# Patient Record
Sex: FEMALE | Race: BLACK OR AFRICAN AMERICAN | URBAN - METROPOLITAN AREA
[De-identification: names, ages, dates, MRNs, and addresses within clinical notes are randomized per-mention and may not be internally consistent; named-entity substitution may affect disease eponyms.]

---

## 2017-02-22 ENCOUNTER — EMERGENCY (EMERGENCY)
Facility: HOSPITAL | Age: 57
LOS: 0 days | Discharge: HOME | End: 2017-02-22
Attending: EMERGENCY MEDICINE | Admitting: EMERGENCY MEDICINE
Payer: OTHER MISCELLANEOUS

## 2017-02-22 VITALS
DIASTOLIC BLOOD PRESSURE: 81 MMHG | WEIGHT: 184.97 LBS | RESPIRATION RATE: 18 BRPM | OXYGEN SATURATION: 100 % | HEART RATE: 94 BPM | HEIGHT: 65 IN | SYSTOLIC BLOOD PRESSURE: 132 MMHG | TEMPERATURE: 97 F

## 2017-02-22 DIAGNOSIS — S90.422A BLISTER (NONTHERMAL), LEFT GREAT TOE, INITIAL ENCOUNTER: ICD-10-CM
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
## 2017-02-22 DIAGNOSIS — Z89.422 ACQUIRED ABSENCE OF OTHER LEFT TOE(S): Chronic | ICD-10-CM
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
## 2017-02-22 DIAGNOSIS — Z88.2 ALLERGY STATUS TO SULFONAMIDES: ICD-10-CM

## 2017-02-22 DIAGNOSIS — E11.9 TYPE 2 DIABETES MELLITUS WITHOUT COMPLICATIONS: ICD-10-CM

## 2017-02-22 DIAGNOSIS — M79.675 PAIN IN LEFT TOE(S): ICD-10-CM

## 2017-02-22 DIAGNOSIS — Z98.890 OTHER SPECIFIED POSTPROCEDURAL STATES: ICD-10-CM

## 2017-02-22 PROCEDURE — 99283 EMERGENCY DEPT VISIT LOW MDM: CPT | Mod: 25

## 2017-02-22 PROCEDURE — 10160 PNXR ASPIR ABSC HMTMA BULLA: CPT

## 2017-02-22 PROCEDURE — 99053 MED SERV 10PM-8AM 24 HR FAC: CPT

## 2017-02-22 RX ORDER — CEPHALEXIN 500 MG
1 CAPSULE ORAL
Qty: 20 | Refills: 0 | OUTPATIENT
Start: 2017-02-22 | End: 2017-02-27

## 2017-02-22 RX ORDER — CEPHALEXIN 500 MG
500 CAPSULE ORAL ONCE
Qty: 0 | Refills: 0 | Status: COMPLETED | OUTPATIENT
Start: 2017-02-22 | End: 2017-02-22

## 2017-02-22 RX ADMIN — Medication 500 MILLIGRAM(S): at 04:56

## 2017-02-22 NOTE — ED ADULT NURSE NOTE - OBJECTIVE STATEMENT
Pt presents to ED w/ left great toe pain, blister, pt has previous amputation to 2nd toe due to injury sustained in 2009, + pulse. motor and sensation in left LE, no bleeding noted, pt denies recent trauma, pt in no apparent distress, bed rails up, safety maintained, will continue to monitor.

## 2017-02-22 NOTE — ED PROVIDER NOTE - OBJECTIVE STATEMENT
57 y/o female with PMhx of DM presents to the ED c/o left great toe pain that started 3 days ago. Pt states that she has a partial right 2nd toe amputation and over the last few days she noticed a blister over the medial side of the left great toe. Currently pt has no other complaints and denies fever, n/v/d and chills. Podiatrist Dr. Bermudez. 57 y/o female with PMhx of DM presents to the ED c/o left great toe pain that started 3 days ago. Pt states that she has a partial left 2nd toe amputation and over the last few days she noticed a blister over the medial side of the left great toe. Currently pt has no other complaints and denies fever, n/v/d and chills. Podiatrist Dr. Bermudez.

## 2017-02-22 NOTE — ED PROVIDER NOTE - NS ED MD SCRIBE ATTENDING SCRIBE SECTIONS
PHYSICAL EXAM/HISTORY OF PRESENT ILLNESS/PROGRESS NOTE/DISPOSITION/PAST MEDICAL/SURGICAL/SOCIAL HISTORY/REVIEW OF SYSTEMS/RESULTS

## 2017-02-22 NOTE — ED PROVIDER NOTE - SKIN, MLM
Skin normal color for race, warm, dry and intact. No evidence of rash. 1cm fluid filled cyst(blister) to the medial aspect of the left foot over the MCPJ. 1cm fluid filled cyst(blister) to the medial aspect of the left foot over the 1st MCPJ. No surrounding erethema or signs of infection.

## 2017-02-22 NOTE — ED PROVIDER NOTE - MEDICAL DECISION MAKING DETAILS
Plan drain and discharge home with abx. Plan drain and discharge home with abx.  Pt stable for DC, much improved after drainage of blister.  Stable for DC to follow up with pMD.

## 2017-02-22 NOTE — ED PROVIDER NOTE - PROGRESS NOTE DETAILS
Scribe Carol: Under sterile conditions using 18 gauge needle, approximately 3cc of clear fluid aspirated from blister. Pt experience immediate relief. Wound covered with bacitracin and non adherent dressing.

## 2017-02-23 ENCOUNTER — EMERGENCY (EMERGENCY)
Facility: HOSPITAL | Age: 57
LOS: 0 days | Discharge: HOME | End: 2017-02-23
Attending: EMERGENCY MEDICINE | Admitting: EMERGENCY MEDICINE
Payer: OTHER MISCELLANEOUS

## 2017-02-23 VITALS
OXYGEN SATURATION: 99 % | WEIGHT: 195.11 LBS | SYSTOLIC BLOOD PRESSURE: 140 MMHG | TEMPERATURE: 98 F | DIASTOLIC BLOOD PRESSURE: 98 MMHG | HEIGHT: 65 IN | HEART RATE: 84 BPM | RESPIRATION RATE: 18 BRPM

## 2017-02-23 DIAGNOSIS — I10 ESSENTIAL (PRIMARY) HYPERTENSION: ICD-10-CM

## 2017-02-23 DIAGNOSIS — M79.672 PAIN IN LEFT FOOT: ICD-10-CM

## 2017-02-23 DIAGNOSIS — Z89.422 ACQUIRED ABSENCE OF OTHER LEFT TOE(S): Chronic | ICD-10-CM

## 2017-02-23 DIAGNOSIS — L84 CORNS AND CALLOSITIES: ICD-10-CM

## 2017-02-23 DIAGNOSIS — E11.9 TYPE 2 DIABETES MELLITUS WITHOUT COMPLICATIONS: ICD-10-CM

## 2017-02-23 PROCEDURE — 99284 EMERGENCY DEPT VISIT MOD MDM: CPT

## 2017-02-23 RX ORDER — ONDANSETRON 8 MG/1
8 TABLET, FILM COATED ORAL ONCE
Qty: 0 | Refills: 0 | Status: COMPLETED | OUTPATIENT
Start: 2017-02-23 | End: 2017-02-23

## 2017-02-23 RX ADMIN — ONDANSETRON 8 MILLIGRAM(S): 8 TABLET, FILM COATED ORAL at 12:34

## 2017-02-23 NOTE — ED STATDOCS - SKIN, MLM
skin normal color for race, warm, dry and intact. Left plantar base boil with no erythema or discharge.

## 2017-02-23 NOTE — ED STATDOCS - DETAILS:
Dr. Juarez: I performed the initial face to face bedside interview with this patient regarding history of present illness, review of symptoms and past medical, social and family history.  I completed an independent physical examination.  I was the initial provider who evaluated this patient.  The history, review of symptoms and examination was documented by the scribe in my presence and I attest to the accuracy of the documentation.  I have signed out the follow up of any pending tests (i.e. labs, radiological studies) to the ACP.  I have discussed the patient’s plan of care and disposition with the ACP.   Return to the ER immediately for any worsening symptoms, concerns, chest pain, fevers, shortness of breath, vomiting, abdominal pain, rashes, neck pain, back pain, numbness, paresthesias, pain or any difficulties at all.  Please follow up with your own private physician or our medical clinic at [phone] in the next 2-3 days.  Find a doctor at [phone].  Copies of your tests were provided to you for follow-up.  You must address all your findings with your doctor.

## 2017-02-23 NOTE — ED STATDOCS - OBJECTIVE STATEMENT
57 y/o F with a PMHx of DM, left toe amputation, HTN, asthma presents to the ED c/o worsening left foot pain. Pt states that she noticed a boil like wound to her left foot a few days ago that has been getting worse. Pt states that she saw her podiatrist JANNET Bermudez yesterday who advised her to come to ED for further evaluation. Pt currently calm and denies fever, cough, chills, SOB, CP or any other acute c/o at this time.

## 2017-02-23 NOTE — ED ADULT NURSE NOTE - OBJECTIVE STATEMENT
Medial side first MPJ left foot with hyperkeratotic area. Patients podiatrist is unavailable for 1 week. Patient is concerned about losing more of her foot since she has diabetes. History of fused ankle from work related injury. Lost second toe also.

## 2017-02-23 NOTE — ED STATDOCS - PROGRESS NOTE DETAILS
56 yr. old female PMH: Asthma, Type 2 DM, Amputation 2nd left Toe   presents to ED with pain in bottom of left foot for a few days seen in ED yesterday and had I and D of blister . Called office and sent to ED to meet podiatrist rresident. Seen and exmined by PMD in Intake refused Lab work and X-Ray of foot. Plan; Consult Podiatric Resident   Left Foot; tender swollen area plantar surface of distal metatarsal. ROM; normal on flexion and extension, DP pulse + . Sensation intact.  pt was offered labs and xray but pt declined . pt only requesting to be evaluated by podiatry Seen and treated by Podiatry callus debrided at 1st  MTJ of left foot removed tolerated well by patient. no signs of infection. MTangdonyai NP

## 2018-02-07 NOTE — ED ADULT NURSE NOTE - THOUGHTS OF SUICIDE/SELF-HARM YN, MLM
Body Location Override (Optional): rt clavicular neck Detail Level: Detailed Patient To Follow-Up With?: our clinic Wound Diameter In Cm(Optional): 0 Wound Evaluated By (Optional): Dr Justus Pritchard No

## 2018-02-11 ENCOUNTER — EMERGENCY (EMERGENCY)
Facility: HOSPITAL | Age: 58
LOS: 0 days | Discharge: HOME | End: 2018-02-11
Attending: EMERGENCY MEDICINE | Admitting: EMERGENCY MEDICINE
Payer: MEDICARE

## 2018-02-11 VITALS — HEIGHT: 65 IN | WEIGHT: 240.08 LBS

## 2018-02-11 VITALS
RESPIRATION RATE: 18 BRPM | DIASTOLIC BLOOD PRESSURE: 66 MMHG | OXYGEN SATURATION: 100 % | SYSTOLIC BLOOD PRESSURE: 117 MMHG | TEMPERATURE: 98 F | HEART RATE: 96 BPM

## 2018-02-11 DIAGNOSIS — S83.91XA SPRAIN OF UNSPECIFIED SITE OF RIGHT KNEE, INITIAL ENCOUNTER: ICD-10-CM

## 2018-02-11 DIAGNOSIS — M25.561 PAIN IN RIGHT KNEE: ICD-10-CM
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
## 2018-02-11 DIAGNOSIS — Z89.422 ACQUIRED ABSENCE OF OTHER LEFT TOE(S): Chronic | ICD-10-CM

## 2018-02-11 DIAGNOSIS — Z88.2 ALLERGY STATUS TO SULFONAMIDES: ICD-10-CM

## 2018-02-11 DIAGNOSIS — E11.9 TYPE 2 DIABETES MELLITUS WITHOUT COMPLICATIONS: ICD-10-CM
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
## 2018-02-11 PROCEDURE — 73562 X-RAY EXAM OF KNEE 3: CPT | Mod: 26,RT

## 2018-02-11 PROCEDURE — 99283 EMERGENCY DEPT VISIT LOW MDM: CPT

## 2018-02-11 RX ORDER — IBUPROFEN 200 MG
600 TABLET ORAL ONCE
Qty: 0 | Refills: 0 | Status: COMPLETED | OUTPATIENT
Start: 2018-02-11 | End: 2018-02-11

## 2018-02-11 RX ORDER — IBUPROFEN 200 MG
1 TABLET ORAL
Qty: 15 | Refills: 0 | OUTPATIENT
Start: 2018-02-11 | End: 2018-02-15

## 2018-02-11 RX ADMIN — Medication 600 MILLIGRAM(S): at 11:34

## 2018-02-11 NOTE — ED STATDOCS - MEDICAL DECISION MAKING DETAILS
Pain reproducible over medial patellar, Physical exam findings not consistent with DVT, no current risk factors. Will x-ray and f/u with orthopedics.

## 2018-02-11 NOTE — ED STATDOCS - OBJECTIVE STATEMENT
56 y/o female with a PMHx of DM, s/p left ankle fusion (3 years ago by Dr. Hermes Bermudez) presents to the ED c/o right knee pain, swelling x3 days. Due to pt's left ankle surgery she is in a walking boot and putting more pressure on her right leg when she walks. No fever or any other acute complaints at this time. Pt taking Neurontin for pain. Pt presents with her right knee wrapped in an ace bandage. Pt is a nurse.

## 2018-02-11 NOTE — ED STATDOCS - PROGRESS NOTE DETAILS
57 yr. old female PMH: Type 2 Diabetes presents to ED with pain in right knee and swelling for 3 days. 57 yr. old female PMH: Type 2 Diabetes presents to ED with pain in right knee and swelling for 3 days. No apparent injury.  No fever or chills. Ortho boot on left foot and favoring it. Seen and examined by attending in intake. Plan: X-Ray. Will F/U with results and re evaluate. Mercy MCMAHON

## 2018-03-15 ENCOUNTER — EMERGENCY (EMERGENCY)
Facility: HOSPITAL | Age: 58
LOS: 0 days | Discharge: HOME | End: 2018-03-15
Attending: EMERGENCY MEDICINE | Admitting: EMERGENCY MEDICINE
Payer: OTHER MISCELLANEOUS

## 2018-03-15 VITALS — HEIGHT: 65 IN | WEIGHT: 179.9 LBS

## 2018-03-15 VITALS
OXYGEN SATURATION: 99 % | SYSTOLIC BLOOD PRESSURE: 127 MMHG | RESPIRATION RATE: 18 BRPM | TEMPERATURE: 98 F | DIASTOLIC BLOOD PRESSURE: 80 MMHG

## 2018-03-15 DIAGNOSIS — G89.18 OTHER ACUTE POSTPROCEDURAL PAIN: ICD-10-CM

## 2018-03-15 DIAGNOSIS — Z89.422 ACQUIRED ABSENCE OF OTHER LEFT TOE(S): ICD-10-CM

## 2018-03-15 DIAGNOSIS — L89.894 PRESSURE ULCER OF OTHER SITE, STAGE 4: ICD-10-CM

## 2018-03-15 DIAGNOSIS — Z88.2 ALLERGY STATUS TO SULFONAMIDES: ICD-10-CM

## 2018-03-15 DIAGNOSIS — M79.671 PAIN IN RIGHT FOOT: ICD-10-CM

## 2018-03-15 DIAGNOSIS — Z89.422 ACQUIRED ABSENCE OF OTHER LEFT TOE(S): Chronic | ICD-10-CM

## 2018-03-15 DIAGNOSIS — E11.621 TYPE 2 DIABETES MELLITUS WITH FOOT ULCER: ICD-10-CM

## 2018-03-15 PROCEDURE — 99284 EMERGENCY DEPT VISIT MOD MDM: CPT

## 2018-03-15 PROCEDURE — 99053 MED SERV 10PM-8AM 24 HR FAC: CPT

## 2018-03-15 NOTE — ED STATDOCS - ATTENDING CONTRIBUTION TO CARE
Attending Contribution to Care: I, Cindy Mcgraw, performed the initial face to face bedside interview with this patient regarding history of present illness, review of symptoms and relevant past medical, social and family history.  I completed an independent physical examination.  I was the initial provider who evaluated this patient. I have signed out the follow up of any pending tests (i.e. labs, radiological studies) to the ACP.  I have communicated the patient’s plan of care and disposition with the ACP.

## 2018-03-15 NOTE — ED STATDOCS - SKIN, MLM
skin normal color for race, warm, dry and intact. Leonard sized stage 4 ulcer, chronic with no drainage, .5 cm deep, on base of first metatarsal on plantar surface of DP, no erythema of leg. Amputation of toe on left foot.

## 2018-03-15 NOTE — ED ADULT TRIAGE NOTE - CHIEF COMPLAINT QUOTE
patient has decubitus ulcers on left foot, has routine debridement done by dr. borjas (last seen 2 weeks ago), dr. borjas not at office today, was told to come to ED. patient reports discomfort to left foot that is expected when ulcer needs debridement. arrived with dressing and surgical boot on foot.

## 2018-03-15 NOTE — ED STATDOCS - OBJECTIVE STATEMENT
57 y-o Female with PMHX of DM, presents to the ED c/o of ulcer on left foot underneath the great toe. Pt has routine debridement done by Dr. Bermudez (last seen 2 weeks ago), Dr. Bermudez not at office, on away on vacation , was told to come to ED if issues occur. Patient reports discomfort to left foot which she states is an indication of needing Debridement. Arrived with dressing and surgical boot on foot. Denies loss of sensation or motor function.

## 2018-05-06 ENCOUNTER — INPATIENT (INPATIENT)
Facility: HOSPITAL | Age: 58
LOS: 4 days | Discharge: HOME | End: 2018-05-11
Payer: OTHER MISCELLANEOUS

## 2018-05-06 VITALS — WEIGHT: 197.09 LBS | HEIGHT: 65 IN

## 2018-05-06 DIAGNOSIS — Z98.1 ARTHRODESIS STATUS: Chronic | ICD-10-CM

## 2018-05-06 DIAGNOSIS — Z89.422 ACQUIRED ABSENCE OF OTHER LEFT TOE(S): Chronic | ICD-10-CM

## 2018-05-06 LAB
ALBUMIN SERPL ELPH-MCNC: 3.4 G/DL — SIGNIFICANT CHANGE UP (ref 3.3–5)
ALP SERPL-CCNC: 94 U/L — SIGNIFICANT CHANGE UP (ref 40–120)
ALT FLD-CCNC: 22 U/L — SIGNIFICANT CHANGE UP (ref 12–78)
ANION GAP SERPL CALC-SCNC: 6 MMOL/L — SIGNIFICANT CHANGE UP (ref 5–17)
APTT BLD: 29.3 SEC — SIGNIFICANT CHANGE UP (ref 27.5–37.4)
AST SERPL-CCNC: 15 U/L — SIGNIFICANT CHANGE UP (ref 15–37)
BASOPHILS # BLD AUTO: 0.04 K/UL — SIGNIFICANT CHANGE UP (ref 0–0.2)
BASOPHILS NFR BLD AUTO: 0.5 % — SIGNIFICANT CHANGE UP (ref 0–2)
BILIRUB SERPL-MCNC: <0.1 MG/DL — LOW (ref 0.2–1.2)
BUN SERPL-MCNC: 18 MG/DL — SIGNIFICANT CHANGE UP (ref 7–23)
CALCIUM SERPL-MCNC: 9.4 MG/DL — SIGNIFICANT CHANGE UP (ref 8.5–10.1)
CHLORIDE SERPL-SCNC: 106 MMOL/L — SIGNIFICANT CHANGE UP (ref 96–108)
CO2 SERPL-SCNC: 28 MMOL/L — SIGNIFICANT CHANGE UP (ref 22–31)
CREAT SERPL-MCNC: 0.73 MG/DL — SIGNIFICANT CHANGE UP (ref 0.5–1.3)
EOSINOPHIL # BLD AUTO: 0.19 K/UL — SIGNIFICANT CHANGE UP (ref 0–0.5)
EOSINOPHIL NFR BLD AUTO: 2.4 % — SIGNIFICANT CHANGE UP (ref 0–6)
GLUCOSE SERPL-MCNC: 149 MG/DL — HIGH (ref 70–99)
HCT VFR BLD CALC: 36.1 % — SIGNIFICANT CHANGE UP (ref 34.5–45)
HGB BLD-MCNC: 11.5 G/DL — SIGNIFICANT CHANGE UP (ref 11.5–15.5)
IMM GRANULOCYTES NFR BLD AUTO: 0.3 % — SIGNIFICANT CHANGE UP (ref 0–1.5)
INR BLD: 0.94 RATIO — SIGNIFICANT CHANGE UP (ref 0.88–1.16)
LACTATE SERPL-SCNC: 0.9 MMOL/L — SIGNIFICANT CHANGE UP (ref 0.7–2)
LYMPHOCYTES # BLD AUTO: 2.61 K/UL — SIGNIFICANT CHANGE UP (ref 1–3.3)
LYMPHOCYTES # BLD AUTO: 33.2 % — SIGNIFICANT CHANGE UP (ref 13–44)
MCHC RBC-ENTMCNC: 25.6 PG — LOW (ref 27–34)
MCHC RBC-ENTMCNC: 31.9 GM/DL — LOW (ref 32–36)
MCV RBC AUTO: 80.2 FL — SIGNIFICANT CHANGE UP (ref 80–100)
MONOCYTES # BLD AUTO: 0.77 K/UL — SIGNIFICANT CHANGE UP (ref 0–0.9)
MONOCYTES NFR BLD AUTO: 9.8 % — SIGNIFICANT CHANGE UP (ref 2–14)
NEUTROPHILS # BLD AUTO: 4.24 K/UL — SIGNIFICANT CHANGE UP (ref 1.8–7.4)
NEUTROPHILS NFR BLD AUTO: 53.8 % — SIGNIFICANT CHANGE UP (ref 43–77)
NRBC # BLD: 0 /100 WBCS — SIGNIFICANT CHANGE UP (ref 0–0)
PLATELET # BLD AUTO: 353 K/UL — SIGNIFICANT CHANGE UP (ref 150–400)
POTASSIUM SERPL-MCNC: 4.3 MMOL/L — SIGNIFICANT CHANGE UP (ref 3.5–5.3)
POTASSIUM SERPL-SCNC: 4.3 MMOL/L — SIGNIFICANT CHANGE UP (ref 3.5–5.3)
PROT SERPL-MCNC: 8 GM/DL — SIGNIFICANT CHANGE UP (ref 6–8.3)
PROTHROM AB SERPL-ACNC: 10.1 SEC — SIGNIFICANT CHANGE UP (ref 9.8–12.7)
RBC # BLD: 4.5 M/UL — SIGNIFICANT CHANGE UP (ref 3.8–5.2)
RBC # FLD: 15.2 % — HIGH (ref 10.3–14.5)
SODIUM SERPL-SCNC: 140 MMOL/L — SIGNIFICANT CHANGE UP (ref 135–145)
WBC # BLD: 7.87 K/UL — SIGNIFICANT CHANGE UP (ref 3.8–10.5)
WBC # FLD AUTO: 7.87 K/UL — SIGNIFICANT CHANGE UP (ref 3.8–10.5)

## 2018-05-06 PROCEDURE — 93010 ELECTROCARDIOGRAM REPORT: CPT

## 2018-05-06 PROCEDURE — 71045 X-RAY EXAM CHEST 1 VIEW: CPT | Mod: 26

## 2018-05-06 PROCEDURE — 99285 EMERGENCY DEPT VISIT HI MDM: CPT

## 2018-05-06 RX ORDER — DEXTROSE 50 % IN WATER 50 %
1 SYRINGE (ML) INTRAVENOUS ONCE
Qty: 0 | Refills: 0 | Status: DISCONTINUED | OUTPATIENT
Start: 2018-05-06 | End: 2018-05-08

## 2018-05-06 RX ORDER — SODIUM CHLORIDE 9 MG/ML
1000 INJECTION, SOLUTION INTRAVENOUS
Qty: 0 | Refills: 0 | Status: DISCONTINUED | OUTPATIENT
Start: 2018-05-06 | End: 2018-05-08

## 2018-05-06 RX ORDER — VANCOMYCIN HCL 1 G
1000 VIAL (EA) INTRAVENOUS ONCE
Qty: 0 | Refills: 0 | Status: DISCONTINUED | OUTPATIENT
Start: 2018-05-06 | End: 2018-05-06

## 2018-05-06 RX ORDER — DEXTROSE 50 % IN WATER 50 %
12.5 SYRINGE (ML) INTRAVENOUS ONCE
Qty: 0 | Refills: 0 | Status: DISCONTINUED | OUTPATIENT
Start: 2018-05-06 | End: 2018-05-08

## 2018-05-06 RX ORDER — DEXTROSE 50 % IN WATER 50 %
25 SYRINGE (ML) INTRAVENOUS ONCE
Qty: 0 | Refills: 0 | Status: DISCONTINUED | OUTPATIENT
Start: 2018-05-06 | End: 2018-05-08

## 2018-05-06 RX ORDER — VANCOMYCIN HCL 1 G
1000 VIAL (EA) INTRAVENOUS ONCE
Qty: 0 | Refills: 0 | Status: COMPLETED | OUTPATIENT
Start: 2018-05-06 | End: 2018-05-06

## 2018-05-06 RX ORDER — ATORVASTATIN CALCIUM 80 MG/1
20 TABLET, FILM COATED ORAL AT BEDTIME
Qty: 0 | Refills: 0 | Status: DISCONTINUED | OUTPATIENT
Start: 2018-05-06 | End: 2018-05-08

## 2018-05-06 RX ORDER — PIPERACILLIN AND TAZOBACTAM 4; .5 G/20ML; G/20ML
3.38 INJECTION, POWDER, LYOPHILIZED, FOR SOLUTION INTRAVENOUS ONCE
Qty: 0 | Refills: 0 | Status: COMPLETED | OUTPATIENT
Start: 2018-05-06 | End: 2018-05-06

## 2018-05-06 RX ORDER — INSULIN LISPRO 100/ML
VIAL (ML) SUBCUTANEOUS AT BEDTIME
Qty: 0 | Refills: 0 | Status: DISCONTINUED | OUTPATIENT
Start: 2018-05-06 | End: 2018-05-08

## 2018-05-06 RX ORDER — HEPARIN SODIUM 5000 [USP'U]/ML
5000 INJECTION INTRAVENOUS; SUBCUTANEOUS EVERY 8 HOURS
Qty: 0 | Refills: 0 | Status: DISCONTINUED | OUTPATIENT
Start: 2018-05-06 | End: 2018-05-08

## 2018-05-06 RX ORDER — INSULIN GLARGINE 100 [IU]/ML
10 INJECTION, SOLUTION SUBCUTANEOUS EVERY MORNING
Qty: 0 | Refills: 0 | Status: DISCONTINUED | OUTPATIENT
Start: 2018-05-07 | End: 2018-05-07

## 2018-05-06 RX ORDER — GLUCAGON INJECTION, SOLUTION 0.5 MG/.1ML
1 INJECTION, SOLUTION SUBCUTANEOUS ONCE
Qty: 0 | Refills: 0 | Status: DISCONTINUED | OUTPATIENT
Start: 2018-05-06 | End: 2018-05-08

## 2018-05-06 RX ORDER — LISINOPRIL 2.5 MG/1
10 TABLET ORAL DAILY
Qty: 0 | Refills: 0 | Status: DISCONTINUED | OUTPATIENT
Start: 2018-05-06 | End: 2018-05-08

## 2018-05-06 RX ORDER — SODIUM CHLORIDE 9 MG/ML
3 INJECTION INTRAMUSCULAR; INTRAVENOUS; SUBCUTANEOUS ONCE
Qty: 0 | Refills: 0 | Status: COMPLETED | OUTPATIENT
Start: 2018-05-06 | End: 2018-05-06

## 2018-05-06 RX ORDER — ACETAMINOPHEN 500 MG
650 TABLET ORAL EVERY 6 HOURS
Qty: 0 | Refills: 0 | Status: DISCONTINUED | OUTPATIENT
Start: 2018-05-06 | End: 2018-05-08

## 2018-05-06 RX ORDER — VANCOMYCIN HCL 1 G
1000 VIAL (EA) INTRAVENOUS EVERY 12 HOURS
Qty: 0 | Refills: 0 | Status: DISCONTINUED | OUTPATIENT
Start: 2018-05-06 | End: 2018-05-08

## 2018-05-06 RX ORDER — INSULIN LISPRO 100/ML
VIAL (ML) SUBCUTANEOUS
Qty: 0 | Refills: 0 | Status: DISCONTINUED | OUTPATIENT
Start: 2018-05-06 | End: 2018-05-08

## 2018-05-06 RX ORDER — PIPERACILLIN AND TAZOBACTAM 4; .5 G/20ML; G/20ML
3.38 INJECTION, POWDER, LYOPHILIZED, FOR SOLUTION INTRAVENOUS EVERY 8 HOURS
Qty: 0 | Refills: 0 | Status: DISCONTINUED | OUTPATIENT
Start: 2018-05-06 | End: 2018-05-08

## 2018-05-06 RX ORDER — INSULIN DEGLUDEC 100 U/ML
0 INJECTION, SOLUTION SUBCUTANEOUS
Qty: 0 | Refills: 0 | COMMUNITY

## 2018-05-06 RX ADMIN — SODIUM CHLORIDE 3 MILLILITER(S): 9 INJECTION INTRAMUSCULAR; INTRAVENOUS; SUBCUTANEOUS at 18:45

## 2018-05-06 RX ADMIN — PIPERACILLIN AND TAZOBACTAM 25 GRAM(S): 4; .5 INJECTION, POWDER, LYOPHILIZED, FOR SOLUTION INTRAVENOUS at 23:54

## 2018-05-06 RX ADMIN — Medication 650 MILLIGRAM(S): at 23:53

## 2018-05-06 RX ADMIN — HEPARIN SODIUM 5000 UNIT(S): 5000 INJECTION INTRAVENOUS; SUBCUTANEOUS at 23:53

## 2018-05-06 RX ADMIN — PIPERACILLIN AND TAZOBACTAM 200 GRAM(S): 4; .5 INJECTION, POWDER, LYOPHILIZED, FOR SOLUTION INTRAVENOUS at 19:55

## 2018-05-06 RX ADMIN — Medication 250 MILLIGRAM(S): at 20:15

## 2018-05-06 RX ADMIN — ATORVASTATIN CALCIUM 20 MILLIGRAM(S): 80 TABLET, FILM COATED ORAL at 23:53

## 2018-05-06 NOTE — ED ADULT NURSE NOTE - ED STAT RN HANDOFF DETAILS
report given to ryan williamson 5e  pt remains aaox4 during my time with her. no c/o pain or discomfort. Has eaten breakfast and lunch, ambulated to the bathroom multiple times with standby assistance. plan of care discussed at length with pt. pt agreeable to plan.  fall/harm risk reviewed. Pt agrees to use callbell when in need of assistance.  report given to the floor. pt transports up with all belongings.

## 2018-05-06 NOTE — ED STATDOCS - PROGRESS NOTE DETAILS
57 yr. old female PMH: HTN, HLD, TIA, Type 2 Diabetes Left ankle Fusion presents to ED with left second amputation and non healing ulcer bottom of left foot. Sent to ED for admission of left foot Osteomyelitis. Seen and examioned byy attending in Intake. Plan: IV, IVF,Labs and MRI Will call Podiatric Resident. Will f/U with results and re evaluate. Mercy MCMAHON Seen abd examined by Dr. Farrell. Mercy MCMAHON

## 2018-05-06 NOTE — ED STATDOCS - ATTENDING CONTRIBUTION TO CARE
I Fede Victoria MD saw and examined the patient. MLP saw and examined the patient under my supervision. I discussed the care of the patient with MLP and agree with MLP's plan, assessment and care of the patient while in the ED.

## 2018-05-06 NOTE — H&P ADULT - NSHPPHYSICALEXAM_GEN_ALL_CORE
PHYSICAL EXAM:    Constitutional: NAD, awake and alert, well-developed  HEENT: PERR, EOMI, Normal Hearing, MMM  Neck: Soft and supple, No LAD, No JVD  Respiratory: Breath sounds are clear bilaterally, No wheezing, rales or rhonchi  Cardiovascular: S1 and S2, regular rate and rhythm, no Murmurs, gallops or rubs  Gastrointestinal: Bowel Sounds present, soft, nontender, nondistended, no guarding, no rebound  Extremities: No peripheral edema  Vascular: 2+ peripheral pulses  Neurological: A/O x 3, no focal deficits  Musculoskeletal: 5/5 strength b/l upper and lower extremities  Skin: non-healing ulcer to base of 1st digit, sole of foot

## 2018-05-06 NOTE — ED STATDOCS - OBJECTIVE STATEMENT
56 y/o female with PMHx of HTN, HLD, DM, TIA, PMHx of ankle fusion (2009), left 2nd toe amputation presents to the ED c/o left great toe sent in by Dr. Bermudez for admission for osteomyelitis. 56 y/o female with PMHx of HTN, HLD, DM, TIA, PSHx of ankle fusion (2009), left 2nd toe amputation presents to the ED c/o left great toe sent in by Dr. Bermudez for admission for osteomyelitis. 58 y/o female with PMHx of HTN, HLD, DM, TIA, PSHx of ankle fusion (2009), left 2nd toe amputation presents to the ED c/o left great toe pain sent in by Dr. Bermudez for admission for osteomyelitis. No fever or chills. No cp, sob or palpitation. No syncope. No nausea or vomiting. No visual or focal neurological complaints. No neck stiffness.

## 2018-05-06 NOTE — CONSULT NOTE ADULT - ASSESSMENT
58 y/o female is seen and evaluated for:    1. ?osteomyelitis of the tibial sesamoid, left foot   2. Stage 3 ulcer at submet 1, left foot   3. Onychomycosis x 2, right foot   4. DM type II    5. Pes planus, left foot  6. Rigid ankle and STJ, left foot     P:  Pt is seen and evaluated; plan d/w Dr. Bermudez   Discussed with patient the diagnosis and the treatment   Flushed the left foot ulcer with normal saline   Applied wet to dry dressing to the left foot ulcer   Prepped the right toe nails with alcohol swab and trimmed right toe nails x 5; pt tolerated the procedure well with no complication  MRI of left foot ordered to r/o any osteomyelitis   Recommend IV abx   Recommend ID consult   Partial weight bearing with boot to the left foot   Podiatry will follow while in house

## 2018-05-06 NOTE — ED ADULT NURSE NOTE - CHPI ED SYMPTOMS NEG
no abrasion/no bruising/no deformity/no stiffness/no fever/no back pain/no difficulty bearing weight/no tingling/no weakness/no numbness

## 2018-05-06 NOTE — H&P ADULT - HISTORY OF PRESENT ILLNESS
56 y/o F with PMHx of HTN, HLD, DM, TIA, PSHx of ankle fusion (2009), left 2nd toe amputation presents with c/o non-healing foot ulcer, sent in by Dr. Bermudez to r/o OM. Pt states she got outpatient MRI done recently which shows bone infection underneath the ulcer. Ulcer present since July 2017 and she has been seeing Dr. Bermudez regularly for treatment. Pt reports pain at the ulcer upon weight bearing. Pt denies any fever, chills, nausea, vomiting, SOB, chest pain.

## 2018-05-06 NOTE — ED ADULT NURSE REASSESSMENT NOTE - NS ED NURSE REASSESS COMMENT FT1
Patient received from ALEE Del Castillo. Patient resting comfortably in bed. Safety and comfort maintained. Will continue to monitor.

## 2018-05-06 NOTE — ED ADULT TRIAGE NOTE - CHIEF COMPLAINT QUOTE
Pt. to the Ed Sent by Dr Bermudez to perform Spinal Surgery - Hx. of right 2nd toe amputation and back pain

## 2018-05-06 NOTE — ED STATDOCS - RESPIRATORY, MLM
breath sounds clear and equal bilaterally. breath sounds clear and equal bilaterally. No retractions.

## 2018-05-06 NOTE — H&P ADULT - NSHPLABSRESULTS_GEN_ALL_CORE
T(C): 37.2 (05-06-18 @ 17:32), Max: 37.2 (05-06-18 @ 17:32)  HR: 87 (05-06-18 @ 17:32) (87 - 87)  BP: 138/97 (05-06-18 @ 17:32) (138/97 - 138/97)  RR: 18 (05-06-18 @ 17:32) (18 - 18)  SpO2: 98% (05-06-18 @ 17:32) (98% - 98%)  Wt(kg): --                              11.5   7.87  )-----------( 353      ( 06 May 2018 18:12 )             36.1     06 May 2018 18:12    140    |  106    |  18     ----------------------------<  149    4.3     |  28     |  0.73     Ca    9.4        06 May 2018 18:12    TPro  8.0    /  Alb  3.4    /  TBili  <0.1   /  DBili  x      /  AST  15     /  ALT  22     /  AlkPhos  94     06 May 2018 18:12    PT/INR - ( 06 May 2018 18:12 )   PT: 10.1 sec;   INR: 0.94 ratio         PTT - ( 06 May 2018 18:12 )  PTT:29.3 sec  CAPILLARY BLOOD GLUCOSE        LIVER FUNCTIONS - ( 06 May 2018 18:12 )  Alb: 3.4 g/dL / Pro: 8.0 gm/dL / ALK PHOS: 94 U/L / ALT: 22 U/L / AST: 15 U/L / GGT: x

## 2018-05-06 NOTE — CONSULT NOTE ADULT - SUBJECTIVE AND OBJECTIVE BOX
Date of Service: 05-06-18 @ 19:40    HPI: 56 y/o female with PMHx of HTN, HLD, DM, TIA, PSHx of ankle fusion (2009), left 2nd toe amputation presents with c/o left great toe ulcer sent in by Dr. Bermudez for admission to treat osteomyelitis at the hallux sesamoid. Pt states she got outpatient MRI done recently which shows bone infection underneath the ulcer. Pt states she has been having the ulcer at the bottom of her left big toe since july 2017 and she has been seeing Dr. Bermudez regularly. Pt states Dr. Bermudez has been debriding it and apply medihoney. Pt states she has been changing the dressing by herself daily. Pt states it has been getting smaller. Pt denies seeing any pus or drainage from the wound. Pt reports pain at the ulcer upon weight bearing. Pt denies any fever, chills, nausea, vomiting, SOB, chest pain.        REVIEW OF SYSTEMS: all other review of systems are negative except as mentioned in HPI     Past Medical History:  DM (diabetes mellitus)    HLD (hyperlipidemia)    HTN (hypertension).    PAST SURGICAL HISTORY  Left ankle fusion (2009)  Left 2nd toe amputation 3 to 4 years ago    Family History:  Father and Brother have DM II     Social History:   Social drinking, denies smoking or drug use.     Allergies    sulfADIAZINE (Hives)    Intolerances    MEDICATIONS  (STANDING):  piperacillin/tazobactam IVPB. 3.375 Gram(s) IV Intermittent once  vancomycin  IVPB 1000 milliGRAM(s) IV Intermittent once    MEDICATIONS  (PRN):      VITALS:  Vital Signs Last 24 Hrs  T(C): 37.2 (05-06-18 @ 17:32), Max: 37.2 (05-06-18 @ 17:32)  T(F): 98.9 (05-06-18 @ 17:32), Max: 98.9 (05-06-18 @ 17:32)  HR: 87 (05-06-18 @ 17:32) (87 - 87)  BP: 138/97 (05-06-18 @ 17:32) (138/97 - 138/97)  BP(mean): --  RR: 18 (05-06-18 @ 17:32) (18 - 18)  SpO2: 98% (05-06-18 @ 17:32) (98% - 98%)        PHYSICAL EXAM:    Constitutional: AAOx3, NAD, comfortable  Extremities:   Derm: Ulceration measuring 1.8cm x 1.5cm is noted at the left first submetatarsal head with granular wound base, positive malodor, positive undermining, no pus, no active drainage, no probe to bone, no fluctuance, no crepitus; periwound: negative erythema, positive hyperkeratosis, positive maceration. The remainder of the foot and the right foot have to open lesion, no discoloration, no signs of acute injury or infection. No interdigital maceration b/l. Nails are dystrophic at right hallux and right 2nd toe and elongated x 5 in all right foot.    Vasc: pedal pulses DP and PT palpable b/l. CFT immediate in all digit x 10. Foot are warm to touch b/l.   Neuro: light touch and protective sensation intact b/l.   Ortho: pain upon palpation of the left submet 1 ulcer. No pain upon palpation and ROM of the remainder of the foot b/l. Pedal compartments are soft and supple. Negative Hollins's and Reza's signs b/l. Rigid left ankle and STJ.   Partial 2nd toe amputation, left foot.   Pes planus, left foot.       LABS:                          11.5   7.87  )-----------( 353      ( 06 May 2018 18:12 )             36.1       05-06    140  |  106  |  18  ----------------------------<  149<H>  4.3   |  28  |  0.73    Ca    9.4      06 May 2018 18:12    TPro  8.0  /  Alb  3.4  /  TBili  <0.1<L>  /  DBili  x   /  AST  15  /  ALT  22  /  AlkPhos  94  05-06      PT/INR - ( 06 May 2018 18:12 )   PT: 10.1 sec;   INR: 0.94 ratio         PTT - ( 06 May 2018 18:12 )  PTT:29.3 sec      RADIOLOGY:  MRI of left foot (5/6): pending

## 2018-05-06 NOTE — H&P ADULT - ASSESSMENT
57F with non-healing foot ulcer, r/o OM      *Non-healing foot ulcer, r/o OM  -admit to medicine  -IV Vanco and Zosyn  -ID eval  -MRI left foot  -Podiatry Dr. Bermudez  -prn pain meds  -wound care nurse eval  -elevate extremity      *DM2:  -check HgA1c  -hold home oral medications  -ISS ac/hs  -diabetic diet      *HTN:  -c/w ACE-I      *HLD:  -c/w statin      *DVT Px:  -Heparin sq 57F with non-healing foot ulcer, r/o OM      *Non-healing foot ulcer, r/o OM  -admit to medicine  -IV Vanco and Zosyn  -ID eval  -MRI left foot  -Podiatry Dr. Bermudez  -prn pain meds  -wound care nurse eval  -elevate extremity      *DM2:  -check HgA1c  -hold home oral medications  -Pt takes 20units of Tresiba daily, will give 10 units lantus daily for now, titrate up as needed  -ISS ac/hs  -diabetic diet      *HTN:  -c/w ACE-I      *HLD:  -c/w statin      *DVT Px:  -Heparin sq

## 2018-05-06 NOTE — ED STATDOCS - MUSCULOSKELETAL, MLM
range of motion is not limited and there is no muscle tenderness. range of motion is not limited and there is no muscle tenderness. No nuchal rigidity.

## 2018-05-06 NOTE — H&P ADULT - NSHPREVIEWOFSYSTEMS_GEN_ALL_CORE
REVIEW OF SYSTEMS:    CONSTITUTIONAL: No weakness, fevers or chills  EYES/ENT: No visual changes;  No vertigo or throat pain   NECK: No pain or stiffness  RESPIRATORY: No cough, wheezing, hemoptysis; No shortness of breath  CARDIOVASCULAR: No chest pain or palpitations  GASTROINTESTINAL: No abdominal or epigastric pain. No nausea, vomiting, or hematemesis; No diarrhea or constipation. No melena or hematochezia.  GENITOURINARY: No dysuria, frequency or hematuria  NEUROLOGICAL: No numbness or weakness  SKIN: non-healing ulcer to left foot  All other review of systems is negative unless indicated above

## 2018-05-07 PROCEDURE — 73718 MRI LOWER EXTREMITY W/O DYE: CPT | Mod: 26,LT

## 2018-05-07 RX ORDER — INSULIN GLARGINE 100 [IU]/ML
15 INJECTION, SOLUTION SUBCUTANEOUS EVERY MORNING
Qty: 0 | Refills: 0 | Status: DISCONTINUED | OUTPATIENT
Start: 2018-05-08 | End: 2018-05-08

## 2018-05-07 RX ADMIN — Medication 650 MILLIGRAM(S): at 05:53

## 2018-05-07 RX ADMIN — Medication 4: at 08:11

## 2018-05-07 RX ADMIN — Medication 2: at 22:55

## 2018-05-07 RX ADMIN — PIPERACILLIN AND TAZOBACTAM 25 GRAM(S): 4; .5 INJECTION, POWDER, LYOPHILIZED, FOR SOLUTION INTRAVENOUS at 15:21

## 2018-05-07 RX ADMIN — HEPARIN SODIUM 5000 UNIT(S): 5000 INJECTION INTRAVENOUS; SUBCUTANEOUS at 05:52

## 2018-05-07 RX ADMIN — LISINOPRIL 10 MILLIGRAM(S): 2.5 TABLET ORAL at 05:51

## 2018-05-07 RX ADMIN — Medication 250 MILLIGRAM(S): at 05:51

## 2018-05-07 RX ADMIN — PIPERACILLIN AND TAZOBACTAM 25 GRAM(S): 4; .5 INJECTION, POWDER, LYOPHILIZED, FOR SOLUTION INTRAVENOUS at 08:09

## 2018-05-07 RX ADMIN — Medication 4: at 18:54

## 2018-05-07 RX ADMIN — Medication 250 MILLIGRAM(S): at 18:53

## 2018-05-07 RX ADMIN — Medication 650 MILLIGRAM(S): at 17:01

## 2018-05-07 RX ADMIN — PIPERACILLIN AND TAZOBACTAM 25 GRAM(S): 4; .5 INJECTION, POWDER, LYOPHILIZED, FOR SOLUTION INTRAVENOUS at 22:55

## 2018-05-07 RX ADMIN — Medication 2: at 15:21

## 2018-05-07 RX ADMIN — INSULIN GLARGINE 10 UNIT(S): 100 INJECTION, SOLUTION SUBCUTANEOUS at 08:11

## 2018-05-07 RX ADMIN — Medication 650 MILLIGRAM(S): at 18:00

## 2018-05-07 RX ADMIN — ATORVASTATIN CALCIUM 20 MILLIGRAM(S): 80 TABLET, FILM COATED ORAL at 22:55

## 2018-05-07 NOTE — PATIENT PROFILE ADULT. - NS TRANSFER PATIENT BELONGINGS
Jewelry/Money (specify)/lap top. three necklaces, one watch, 5 earings/Cell Phone/PDA (specify)/Electronic Device (specify)

## 2018-05-07 NOTE — PROGRESS NOTE ADULT - ASSESSMENT
58 y/o female is seen and evaluated for:    1. ? Acute osteomyelitis of the tibial sesamoid, left foot   2. Full thickness ulceration Ogden Grade 1; submetatarsal head 1, left foot   3. Onychomycosis x 2, right foot   4. DM type II    5. Pes planus, left foot  6. s/p Right ankle fusion    P:  Pt is seen and evaluated; plan d/w Dr. Bermudez   X-rays reviewed; Right foot X-rays (5/6): pending final report  Discussed with patient the diagnosis and the treatment   Flushed the left foot ulcer with normal saline   Applied wet to dry dressing to the left foot ulcer   MRI of left foot ordered to r/o any osteomyelitis   Recommend IV abx per ID appreciated  Partial weight bearing with CAM to the left foot   Podiatry will follow while in house 56 y/o female is seen and evaluated for:    1. ? Acute osteomyelitis of the tibial sesamoid, left foot   2. Full thickness ulceration Ogden Grade 1; submetatarsal head 1, left foot   3. Onychomycosis x 2, right foot   4. DM type II    5. Pes planus, left foot  6. s/p Right ankle fusion    P:  Pt is seen and evaluated; plan d/w Dr. Bermudez   Discussed with patient the diagnosis and the treatment   Flushed the left foot ulcer with normal saline   Applied wet to dry dressing to the left foot ulcer   MRI of Left foot ordered to r/o any osteomyelitis   Recommend IV abx per ID appreciated  Partial weight bearing with CAM to the left foot   Podiatry will follow while in house

## 2018-05-07 NOTE — PROGRESS NOTE ADULT - ATTENDING COMMENTS
Patient seen and examined, agree with podiatry resident assessment and plan, drop of betadine with saline flush, and pack loosely, Ct scan was negative for abscess or OM, will hold off from surgery as patient is improving.  Podiatry to follow. Patient seen and examined, agree with podiatry resident assessment and plan, MRI pending, surgery pending MRI results.

## 2018-05-07 NOTE — CONSULT NOTE ADULT - ASSESSMENT
56 y/o F with PMHx of HTN, HLD, DM, TIA, PSHx of ankle fusion (2009), left 2nd toe amputation presents with c/o non-healing foot ulcer, sent in by Dr. Bermudez to r/o OM. Pt states she got outpatient MRI done recently which shows bone infection underneath the ulcer. Ulcer present since July 2017 and she has been seeing Dr. Bermudez regularly for treatment. Pt reports pain at the ulcer upon weight bearing. Pt denies any fever, chills, nausea, vomiting, SOB, chest pain. Here afebrile, normal wbc ct, was given IV vancomycin/zosyn.     1. L foot ulcer/probable OM  - agree with vancomycin [id number], check trough prior to 4th dose for mrsa coverage   - agree with zosyn [id number] for mssa/strep/gnr/anaroebic coverage   - f/u cultures  - podiatry f/u  - f/u MRI  - tolerating abx well so far; no side effects noted.  -reason for abx use and side effects reviewed with patient  - supportive care  - f/u cbc    2. other issues - care per medicine

## 2018-05-07 NOTE — PROGRESS NOTE ADULT - SUBJECTIVE AND OBJECTIVE BOX
CHIEF COMPLAINT: nonhealing ulcer    SUBJECTIVE: MRI c/w OM. Added on for OR tomorrow. no new complaints.    REVIEW OF SYSTEMS: All other review of systems is negative unless indicated above    Vital Signs Last 24 Hrs  T(C): 36.9 (07 May 2018 16:34), Max: 36.9 (07 May 2018 04:58)  T(F): 98.4 (07 May 2018 16:34), Max: 98.5 (07 May 2018 04:58)  HR: 86 (07 May 2018 16:34) (83 - 88)  BP: 111/84 (07 May 2018 16:34) (110/56 - 126/66)  RR: 128 (07 May 2018 16:34) (16 - 128)  SpO2: 97% (07 May 2018 16:34) (95% - 99%)    PHYSICAL EXAM:              Constitutional: NAD, awake and alert, well-developed  	HEENT: PERR, EOMI, Normal Hearing, MMM  	Neck: Soft and supple, No LAD, No JVD  	Respiratory: Breath sounds are clear bilaterally, No wheezing, rales or rhonchi  	Cardiovascular: S1 and S2, regular rate and rhythm, no Murmurs, gallops or rubs  	Gastrointestinal: Bowel Sounds present, soft, nontender, nondistended, no guarding, no rebound  	Extremities: No peripheral edema  	Vascular: 2+ peripheral pulses  	Neurological: A/O x 3, no focal deficits  	Musculoskeletal: 5/5 strength b/l upper and lower extremities              Skin: non-healing ulcer to base of 1st digit, sole of foot    MEDICATIONS:  MEDICATIONS  (STANDING):  atorvastatin 20 milliGRAM(s) Oral at bedtime  dextrose 5%. 1000 milliLiter(s) (50 mL/Hr) IV Continuous <Continuous>  dextrose 50% Injectable 12.5 Gram(s) IV Push once  dextrose 50% Injectable 25 Gram(s) IV Push once  dextrose 50% Injectable 25 Gram(s) IV Push once  heparin  Injectable 5000 Unit(s) SubCutaneous every 8 hours  insulin lispro (HumaLOG) corrective regimen sliding scale   SubCutaneous three times a day before meals  insulin lispro (HumaLOG) corrective regimen sliding scale   SubCutaneous at bedtime  lisinopril 10 milliGRAM(s) Oral daily  piperacillin/tazobactam IVPB. 3.375 Gram(s) IV Intermittent every 8 hours  vancomycin  IVPB 1000 milliGRAM(s) IV Intermittent every 12 hours    LABS: All Labs Reviewed:                        11.0   5.04  )-----------( 324      ( 07 May 2018 06:02 )             36.0     141  |  107  |  19  ----------------------------<  238<H>  4.1   |  27  |  0.79    Ca    8.9      07 May 2018 06:02    TPro  8.0  /  Alb  3.4  /  TBili  <0.1<L>  /  DBili  x   /  AST  15  /  ALT  22  /  AlkPhos  94  05-06    PT/INR - ( 07 May 2018 06:02 )   PT: 9.6 sec;   INR: 0.89 ratio    PTT - ( 06 May 2018 18:12 )  PTT:29.3 sec    < from: MR Foot No Cont, Left (05.07.18 @ 14:34) >  Impression:    Plantar wound at the level ofthe first metatarsal phalangeal joint.   Acute osteomyelitis of the plantar base of the first proximal phalanx   extending to the proximal shaft in the setting of a chronic posttraumatic   deformity. Marrow signal alteration within the tibial and fibular   sesamoids also concerning for acute osteomyelitis.

## 2018-05-07 NOTE — CONSULT NOTE ADULT - SUBJECTIVE AND OBJECTIVE BOX
Patient is a 57y old  Female who presents with a chief complaint of non-healing ulcer (06 May 2018 21:14)    HPI:  56 y/o F with PMHx of HTN, HLD, DM, TIA, PSHx of ankle fusion (2009), left 2nd toe amputation presents with c/o non-healing foot ulcer, sent in by Dr. Bermudez to r/o OM. Pt states she got outpatient MRI done recently which shows bone infection underneath the ulcer. Ulcer present since July 2017 and she has been seeing Dr. Bermudez regularly for treatment. Pt reports pain at the ulcer upon weight bearing. Pt denies any fever, chills, nausea, vomiting, SOB, chest pain. Here afebrile, normal wbc ct, was given IV vancomycin/zosyn.       PMH: as above  PSH: as above  Meds: per reconciliation sheet, noted below  MEDICATIONS  (STANDING):  atorvastatin 20 milliGRAM(s) Oral at bedtime  dextrose 5%. 1000 milliLiter(s) (50 mL/Hr) IV Continuous <Continuous>  dextrose 50% Injectable 12.5 Gram(s) IV Push once  dextrose 50% Injectable 25 Gram(s) IV Push once  dextrose 50% Injectable 25 Gram(s) IV Push once  heparin  Injectable 5000 Unit(s) SubCutaneous every 8 hours  insulin glargine Injectable (LANTUS) 10 Unit(s) SubCutaneous every morning  insulin lispro (HumaLOG) corrective regimen sliding scale   SubCutaneous three times a day before meals  insulin lispro (HumaLOG) corrective regimen sliding scale   SubCutaneous at bedtime  lisinopril 10 milliGRAM(s) Oral daily  piperacillin/tazobactam IVPB. 3.375 Gram(s) IV Intermittent every 8 hours  vancomycin  IVPB 1000 milliGRAM(s) IV Intermittent every 12 hours    Allergies    sulfADIAZINE (Hives)    Intolerances      Social: no smoking, no alcohol, no illegal drugs; no recent travel, no exposure to TB  FAMILY HISTORY:  No pertinent family history in first degree relatives     no history of premature cardiovascular disease in first degree relatives    ROS: no HA, no dizziness, no sore throat, no blurry vision, no CP, no palpitations, no SOB, no cough, no abdominal pain, no diarrhea, no N/V, no dysuria, no leg pain, no claudication, no rectal pain or bleeding, no night sweats  All other systems reviewed and are negative    Vital Signs Last 24 Hrs  T(C): 36.9 (07 May 2018 04:58), Max: 37.2 (06 May 2018 17:32)  T(F): 98.5 (07 May 2018 04:58), Max: 98.9 (06 May 2018 17:32)  HR: 83 (07 May 2018 04:58) (83 - 88)  BP: 110/56 (07 May 2018 04:58) (110/56 - 138/97)  BP(mean): --  RR: 18 (07 May 2018 04:58) (18 - 18)  SpO2: 95% (07 May 2018 04:58) (95% - 98%)  Daily Height in cm: 165.1 (06 May 2018 16:53)    Daily     PE:    Constitutional: frail looking  HEENT: NC/AT, EOMI, PERRLA, conjunctivae clear; ears and nose atraumatic; pharynx benign  Neck: supple; thyroid not palpable  Back: no tenderness  Respiratory: respiratory effort normal; clear to auscultation  Cardiovascular: S1S2 regular, no murmurs  Abdomen: soft, not tender, not distended, positive BS; liver and spleen WNL  Genitourinary: no suprapubic tenderness  Lymphatic: no LN palpable  Musculoskeletal: no muscle tenderness, no joint swelling or tenderness  Extremities: no pedal edema  Neurological/ Psychiatric: AxOx3, Judgement and insight normal; moving all extremities  Skin: L plantar aspect of 1st toe with 3 cm ulcer with surrounding warmth    Labs: all available labs reviewed                        11.0   5.04  )-----------( 324      ( 07 May 2018 06:02 )             36.0     05-07    141  |  107  |  19  ----------------------------<  238<H>  4.1   |  27  |  0.79    Ca    8.9      07 May 2018 06:02    TPro  8.0  /  Alb  3.4  /  TBili  <0.1<L>  /  DBili  x   /  AST  15  /  ALT  22  /  AlkPhos  94  05-06     LIVER FUNCTIONS - ( 06 May 2018 18:12 )  Alb: 3.4 g/dL / Pro: 8.0 gm/dL / ALK PHOS: 94 U/L / ALT: 22 U/L / AST: 15 U/L / GGT: x                 Radiology: all available radiological tests reviewed  < from: Xray Chest 1 View AP/PA. (05.06.18 @ 19:42) >    EXAM:  XR CHEST AP OR PA 1V                            PROCEDURE DATE:  05/06/2018          INTERPRETATION:  AP chest on May 6, 2018. Patient is preop and has   osteomyelitis.    Heart size is within normal limits.    The lung fields and pleural surfaces are unremarkable except for minimal   linear scar like area left lower lung field. Left PICC line seen on   February 23, 2016 is no longer evident.    Deviation of the trachea to the right at the thoracic inlet suggesting   left lobe thyroid enlargement is unchanged.    IMPRESSION: Probable left thyroid enlargement again noted. Minimal left   lung scar. Prior PICC line removed.        Advanced directives addressed: full resuscitation

## 2018-05-07 NOTE — PROGRESS NOTE ADULT - ASSESSMENT
57F with non-healing foot ulcer, r/o OM    # Non-healing foot ulcer,   # Acute Osteomyelitis  - admit to medicine  - IV Vanco and Zosyn  - ID eval appreicated  - MRI noted  - Podiatry Dr. Bermudez  - prn pain meds    # DM2:  - A1C 10.7  - hold home oral medications  - Lantus increased to 15u - will titrate as needed  - ISS ac/hs  - diabetic diet    # HTN:  -c/w ACE-I    # HLD:  - c/w statin    # DVT Px:  -Heparin sq

## 2018-05-07 NOTE — PROGRESS NOTE ADULT - SUBJECTIVE AND OBJECTIVE BOX
Date of Service: 05-07-18    58 y/o female with PMHx of HTN, HLD, DM, TIA, PSHx of ankle fusion (2009), left 2nd toe amputation presents seen at bedside for f/u evaluation of left plantar foot ulceration. Pt sent in to the ED by Dr. Bermudez for admission to treat osteomyelitis at the hallux sesamoid. Pt received outpatient MRI done recently which shows bone infection underneath the ulcer. Pt states she has been having the ulcer at the bottom of her left big toe since July 2017 and she has been seeing Dr. Bermudez regularly. Pt states Dr. Bermudez has been debriding it and applying medihoney. Pt states she has been changing the dressing by herself daily. Pt states it has been getting smaller. Pt denies seeing any pus or drainage from the wound. Pt reports pain at the ulcer upon weight bearing. Pt denies any fever, chills, nausea, vomiting, SOB, chest pain.            Past Medical History:  DM (diabetes mellitus)    HLD (hyperlipidemia)    HTN (hypertension).    PAST SURGICAL HISTORY  Left ankle fusion (2009)  Left 2nd toe amputation 3 to 4 years ago    Family History:  Father and Brother have DM II     Social History:   Social drinking, denies smoking or drug use.     Allergies  sulfADIAZINE (Hives)      MEDICATIONS  (STANDING):  atorvastatin 20 milliGRAM(s) Oral at bedtime  dextrose 5%. 1000 milliLiter(s) (50 mL/Hr) IV Continuous <Continuous>  dextrose 50% Injectable 12.5 Gram(s) IV Push once  dextrose 50% Injectable 25 Gram(s) IV Push once  dextrose 50% Injectable 25 Gram(s) IV Push once  heparin  Injectable 5000 Unit(s) SubCutaneous every 8 hours  insulin glargine Injectable (LANTUS) 10 Unit(s) SubCutaneous every morning  insulin lispro (HumaLOG) corrective regimen sliding scale   SubCutaneous three times a day before meals  insulin lispro (HumaLOG) corrective regimen sliding scale   SubCutaneous at bedtime  lisinopril 10 milliGRAM(s) Oral daily  piperacillin/tazobactam IVPB. 3.375 Gram(s) IV Intermittent every 8 hours  vancomycin  IVPB 1000 milliGRAM(s) IV Intermittent every 12 hours    MEDICATIONS  (PRN):  acetaminophen   Tablet. 650 milliGRAM(s) Oral every 6 hours PRN Mild Pain (1 - 3) or fever, temp >100.4  dextrose Gel 1 Dose(s) Oral once PRN Blood Glucose LESS THAN 70 milliGRAM(s)/deciliter  glucagon  Injectable 1 milliGRAM(s) IntraMuscular once PRN Glucose LESS THAN 70 milligrams/deciliter    Vital Signs Last 24 Hrs  T(C): 36.9 (07 May 2018 04:58), Max: 37.2 (06 May 2018 17:32)  T(F): 98.5 (07 May 2018 04:58), Max: 98.9 (06 May 2018 17:32)  HR: 83 (07 May 2018 04:58) (83 - 88)  BP: 110/56 (07 May 2018 04:58) (110/56 - 138/97)  BP(mean): --  RR: 18 (07 May 2018 04:58) (18 - 18)  SpO2: 95% (07 May 2018 04:58) (95% - 98%)      PHYSICAL EXAM:    Constitutional: AAOx3, NAD, comfortable  Extremities:   Derm: Ulceration measuring 1.8cm x 1.5cm is noted at the left first submetatarsal head with granular wound base, positive malodor, positive undermining, no pus, no active drainage, no probe to bone, no fluctuance, no crepitus; periwound: negative erythema, positive hyperkeratosis, mild maceration. The remainder of the foot and the right foot have to open lesion, no discoloration, no signs of acute injury or infection. No interdigital maceration b/l. Nails are dystrophic at right hallux and right 2nd toe and elongated x 5 in all right foot.    Vasc: pedal pulses DP and PT palpable b/l. CFT immediate in all digit x 10. Foot are warm to touch b/l.   Neuro: light touch and protective sensation intact b/l.   Ortho: pain upon palpation of the left submet 1 ulcer. No pain upon palpation and ROM of the remainder of the foot b/l. Pedal compartments are soft and supple. Negative Hollins's and Reza's signs b/l. Rigid left ankle and STJ.   Partial 2nd toe amputation, left foot.   Pes planus, left foot.       LABS:    (05-07 @ 06:02)                      11.0  5.04 )-----------( 324                 36.0    Neutrophils = 2.74 (54.3%)  Lymphocytes = 1.50 (29.8%)  Eosinophils = 0.16 (3.2%)  Basophils = 0.03 (0.6%)  Monocytes = 0.61 (12.1%)      05-07    141  |  107  |  19  ----------------------------<  238<H>  4.1   |  27  |  0.79    Ca    8.9      07 May 2018 06:02    TPro  8.0  /  Alb  3.4  /  TBili  <0.1<L>  /  DBili  x   /  AST  15  /  ALT  22  /  AlkPhos  94  05-06    ( 07 May 2018 06:02 )   PT: 9.6 sec;   INR: 0.89 ratio;    PTT:29.3 sec          RADIOLOGY:    Right foot X-rays (5/6): pending final report      MRI of left foot (5/6): pending Date of Service: 05-07-18    58 y/o female with PMHx of HTN, HLD, DM, TIA, PSHx of ankle fusion (2009), left 2nd toe amputation presents seen at bedside for f/u evaluation of left plantar foot ulceration. Pt sent in to the ED by Dr. Bermudez for admission to treat osteomyelitis at the hallux sesamoid. Pt received outpatient MRI done recently which shows bone infection underneath the ulcer. Pt states she has been having the ulcer at the bottom of her left big toe since July 2017 and she has been seeing Dr. Bermudez regularly. Pt states Dr. Bermudez has been debriding it and applying medihoney. Pt states she has been changing the dressing by herself daily. Pt states it has been getting smaller. Pt denies seeing any pus or drainage from the wound. Pt reports pain at the ulcer upon weight bearing. Pt denies any fever, chills, nausea, vomiting, SOB, chest pain.            Past Medical History:  DM (diabetes mellitus)    HLD (hyperlipidemia)    HTN (hypertension).    PAST SURGICAL HISTORY  Left ankle fusion (2009)  Left 2nd toe amputation 3 to 4 years ago    Family History:  Father and Brother have DM II     Social History:   Social drinking, denies smoking or drug use.     Allergies  sulfADIAZINE (Hives)      MEDICATIONS  (STANDING):  atorvastatin 20 milliGRAM(s) Oral at bedtime  dextrose 5%. 1000 milliLiter(s) (50 mL/Hr) IV Continuous <Continuous>  dextrose 50% Injectable 12.5 Gram(s) IV Push once  dextrose 50% Injectable 25 Gram(s) IV Push once  dextrose 50% Injectable 25 Gram(s) IV Push once  heparin  Injectable 5000 Unit(s) SubCutaneous every 8 hours  insulin glargine Injectable (LANTUS) 10 Unit(s) SubCutaneous every morning  insulin lispro (HumaLOG) corrective regimen sliding scale   SubCutaneous three times a day before meals  insulin lispro (HumaLOG) corrective regimen sliding scale   SubCutaneous at bedtime  lisinopril 10 milliGRAM(s) Oral daily  piperacillin/tazobactam IVPB. 3.375 Gram(s) IV Intermittent every 8 hours  vancomycin  IVPB 1000 milliGRAM(s) IV Intermittent every 12 hours    MEDICATIONS  (PRN):  acetaminophen   Tablet. 650 milliGRAM(s) Oral every 6 hours PRN Mild Pain (1 - 3) or fever, temp >100.4  dextrose Gel 1 Dose(s) Oral once PRN Blood Glucose LESS THAN 70 milliGRAM(s)/deciliter  glucagon  Injectable 1 milliGRAM(s) IntraMuscular once PRN Glucose LESS THAN 70 milligrams/deciliter    Vital Signs Last 24 Hrs  T(C): 36.9 (07 May 2018 04:58), Max: 37.2 (06 May 2018 17:32)  T(F): 98.5 (07 May 2018 04:58), Max: 98.9 (06 May 2018 17:32)  HR: 83 (07 May 2018 04:58) (83 - 88)  BP: 110/56 (07 May 2018 04:58) (110/56 - 138/97)  BP(mean): --  RR: 18 (07 May 2018 04:58) (18 - 18)  SpO2: 95% (07 May 2018 04:58) (95% - 98%)      PHYSICAL EXAM:    Constitutional: AAOx3, NAD, comfortable  Extremities:   Derm: Ulceration measuring 1.8cm x 1.5cm is noted at the left first submetatarsal head with granular wound base, positive malodor, positive undermining, no pus, no active drainage, no probe to bone, no fluctuance, no crepitus; periwound: negative erythema, positive hyperkeratosis, mild maceration. The remainder of the foot and the right foot have to open lesion, no discoloration, no signs of acute injury or infection. No interdigital maceration b/l. Nails are dystrophic at right hallux and right 2nd toe and elongated x 5 in all right foot.    Vasc: pedal pulses DP and PT palpable b/l. CFT immediate in all digit x 10. Foot are warm to touch b/l.   Neuro: light touch and protective sensation intact b/l.   Ortho: pain upon palpation of the left submet 1 ulcer. No pain upon palpation and ROM of the remainder of the foot b/l. Pedal compartments are soft and supple. Negative Hollins's and Reza's signs b/l. Rigid left ankle and STJ.   Partial 2nd toe amputation, left foot.   Pes planus, left foot.       LABS:    (05-07 @ 06:02)                      11.0  5.04 )-----------( 324                 36.0    Neutrophils = 2.74 (54.3%)  Lymphocytes = 1.50 (29.8%)  Eosinophils = 0.16 (3.2%)  Basophils = 0.03 (0.6%)  Monocytes = 0.61 (12.1%)      05-07    141  |  107  |  19  ----------------------------<  238<H>  4.1   |  27  |  0.79    Ca    8.9      07 May 2018 06:02    TPro  8.0  /  Alb  3.4  /  TBili  <0.1<L>  /  DBili  x   /  AST  15  /  ALT  22  /  AlkPhos  94  05-06    ( 07 May 2018 06:02 )   PT: 9.6 sec;   INR: 0.89 ratio;    PTT:29.3 sec          RADIOLOGY:      MRI of left foot (5/6): pending

## 2018-05-08 ENCOUNTER — RESULT REVIEW (OUTPATIENT)
Age: 58
End: 2018-05-08

## 2018-05-08 LAB — VANCOMYCIN TROUGH SERPL-MCNC: 8.6 UG/ML — LOW (ref 10–20)

## 2018-05-08 PROCEDURE — 88311 DECALCIFY TISSUE: CPT | Mod: 26

## 2018-05-08 PROCEDURE — 73630 X-RAY EXAM OF FOOT: CPT | Mod: 26,LT

## 2018-05-08 PROCEDURE — 88304 TISSUE EXAM BY PATHOLOGIST: CPT | Mod: 26

## 2018-05-08 RX ORDER — HYDROMORPHONE HYDROCHLORIDE 2 MG/ML
0.5 INJECTION INTRAMUSCULAR; INTRAVENOUS; SUBCUTANEOUS
Qty: 0 | Refills: 0 | Status: DISCONTINUED | OUTPATIENT
Start: 2018-05-08 | End: 2018-05-11

## 2018-05-08 RX ORDER — ACETAMINOPHEN 500 MG
650 TABLET ORAL EVERY 6 HOURS
Qty: 0 | Refills: 0 | Status: DISCONTINUED | OUTPATIENT
Start: 2018-05-08 | End: 2018-05-11

## 2018-05-08 RX ORDER — HEPARIN SODIUM 5000 [USP'U]/ML
5000 INJECTION INTRAVENOUS; SUBCUTANEOUS EVERY 8 HOURS
Qty: 0 | Refills: 0 | Status: DISCONTINUED | OUTPATIENT
Start: 2018-05-08 | End: 2018-05-11

## 2018-05-08 RX ORDER — INSULIN GLARGINE 100 [IU]/ML
15 INJECTION, SOLUTION SUBCUTANEOUS EVERY MORNING
Qty: 0 | Refills: 0 | Status: DISCONTINUED | OUTPATIENT
Start: 2018-05-08 | End: 2018-05-09

## 2018-05-08 RX ORDER — SODIUM CHLORIDE 9 MG/ML
1000 INJECTION, SOLUTION INTRAVENOUS
Qty: 0 | Refills: 0 | Status: DISCONTINUED | OUTPATIENT
Start: 2018-05-08 | End: 2018-05-11

## 2018-05-08 RX ORDER — OXYCODONE HYDROCHLORIDE 5 MG/1
10 TABLET ORAL EVERY 4 HOURS
Qty: 0 | Refills: 0 | Status: DISCONTINUED | OUTPATIENT
Start: 2018-05-08 | End: 2018-05-11

## 2018-05-08 RX ORDER — DEXTROSE 50 % IN WATER 50 %
1 SYRINGE (ML) INTRAVENOUS ONCE
Qty: 0 | Refills: 0 | Status: DISCONTINUED | OUTPATIENT
Start: 2018-05-08 | End: 2018-05-11

## 2018-05-08 RX ORDER — GLUCAGON INJECTION, SOLUTION 0.5 MG/.1ML
1 INJECTION, SOLUTION SUBCUTANEOUS ONCE
Qty: 0 | Refills: 0 | Status: DISCONTINUED | OUTPATIENT
Start: 2018-05-08 | End: 2018-05-11

## 2018-05-08 RX ORDER — INSULIN LISPRO 100/ML
VIAL (ML) SUBCUTANEOUS AT BEDTIME
Qty: 0 | Refills: 0 | Status: DISCONTINUED | OUTPATIENT
Start: 2018-05-08 | End: 2018-05-11

## 2018-05-08 RX ORDER — INSULIN GLARGINE 100 [IU]/ML
20 INJECTION, SOLUTION SUBCUTANEOUS EVERY MORNING
Qty: 0 | Refills: 0 | Status: CANCELLED | OUTPATIENT
Start: 2018-05-09 | End: 2018-05-08

## 2018-05-08 RX ORDER — SODIUM CHLORIDE 9 MG/ML
1000 INJECTION INTRAMUSCULAR; INTRAVENOUS; SUBCUTANEOUS
Qty: 0 | Refills: 0 | Status: COMPLETED | OUTPATIENT
Start: 2018-05-08 | End: 2018-05-08

## 2018-05-08 RX ORDER — SODIUM CHLORIDE 9 MG/ML
1000 INJECTION INTRAMUSCULAR; INTRAVENOUS; SUBCUTANEOUS
Qty: 0 | Refills: 0 | Status: DISCONTINUED | OUTPATIENT
Start: 2018-05-08 | End: 2018-05-09

## 2018-05-08 RX ORDER — ATORVASTATIN CALCIUM 80 MG/1
20 TABLET, FILM COATED ORAL AT BEDTIME
Qty: 0 | Refills: 0 | Status: DISCONTINUED | OUTPATIENT
Start: 2018-05-08 | End: 2018-05-11

## 2018-05-08 RX ORDER — INSULIN GLARGINE 100 [IU]/ML
20 INJECTION, SOLUTION SUBCUTANEOUS EVERY MORNING
Qty: 0 | Refills: 0 | Status: DISCONTINUED | OUTPATIENT
Start: 2018-05-08 | End: 2018-05-08

## 2018-05-08 RX ORDER — DEXTROSE 50 % IN WATER 50 %
25 SYRINGE (ML) INTRAVENOUS ONCE
Qty: 0 | Refills: 0 | Status: DISCONTINUED | OUTPATIENT
Start: 2018-05-08 | End: 2018-05-11

## 2018-05-08 RX ORDER — LISINOPRIL 2.5 MG/1
10 TABLET ORAL DAILY
Qty: 0 | Refills: 0 | Status: DISCONTINUED | OUTPATIENT
Start: 2018-05-08 | End: 2018-05-11

## 2018-05-08 RX ORDER — INSULIN LISPRO 100/ML
VIAL (ML) SUBCUTANEOUS
Qty: 0 | Refills: 0 | Status: DISCONTINUED | OUTPATIENT
Start: 2018-05-08 | End: 2018-05-11

## 2018-05-08 RX ORDER — SODIUM CHLORIDE 9 MG/ML
1000 INJECTION INTRAMUSCULAR; INTRAVENOUS; SUBCUTANEOUS
Qty: 0 | Refills: 0 | Status: DISCONTINUED | OUTPATIENT
Start: 2018-05-08 | End: 2018-05-11

## 2018-05-08 RX ORDER — DEXTROSE 50 % IN WATER 50 %
12.5 SYRINGE (ML) INTRAVENOUS ONCE
Qty: 0 | Refills: 0 | Status: DISCONTINUED | OUTPATIENT
Start: 2018-05-08 | End: 2018-05-11

## 2018-05-08 RX ADMIN — SODIUM CHLORIDE 50 MILLILITER(S): 9 INJECTION INTRAMUSCULAR; INTRAVENOUS; SUBCUTANEOUS at 12:42

## 2018-05-08 RX ADMIN — Medication 250 MILLIGRAM(S): at 17:52

## 2018-05-08 RX ADMIN — Medication 4: at 12:41

## 2018-05-08 RX ADMIN — LISINOPRIL 10 MILLIGRAM(S): 2.5 TABLET ORAL at 05:55

## 2018-05-08 RX ADMIN — Medication 0: at 19:59

## 2018-05-08 RX ADMIN — PIPERACILLIN AND TAZOBACTAM 25 GRAM(S): 4; .5 INJECTION, POWDER, LYOPHILIZED, FOR SOLUTION INTRAVENOUS at 06:51

## 2018-05-08 RX ADMIN — OXYCODONE HYDROCHLORIDE 10 MILLIGRAM(S): 5 TABLET ORAL at 22:25

## 2018-05-08 RX ADMIN — Medication 250 MILLIGRAM(S): at 05:09

## 2018-05-08 RX ADMIN — ATORVASTATIN CALCIUM 20 MILLIGRAM(S): 80 TABLET, FILM COATED ORAL at 21:31

## 2018-05-08 RX ADMIN — INSULIN GLARGINE 15 UNIT(S): 100 INJECTION, SOLUTION SUBCUTANEOUS at 12:41

## 2018-05-08 RX ADMIN — Medication 10: at 07:20

## 2018-05-08 RX ADMIN — PIPERACILLIN AND TAZOBACTAM 25 GRAM(S): 4; .5 INJECTION, POWDER, LYOPHILIZED, FOR SOLUTION INTRAVENOUS at 15:10

## 2018-05-08 RX ADMIN — HEPARIN SODIUM 5000 UNIT(S): 5000 INJECTION INTRAVENOUS; SUBCUTANEOUS at 21:31

## 2018-05-08 NOTE — PROGRESS NOTE ADULT - ASSESSMENT
58 y/o F with PMHx of HTN, HLD, DM, TIA, PSHx of ankle fusion (2009), left 2nd toe amputation presents with c/o non-healing foot ulcer, sent in by Dr. Bermudez to r/o OM. Pt states she got outpatient MRI done recently which shows bone infection underneath the ulcer. Ulcer present since July 2017 and she has been seeing Dr. Bermudez regularly for treatment. Pt reports pain at the ulcer upon weight bearing. Pt denies any fever, chills, nausea, vomiting, SOB, chest pain. Here afebrile, normal wbc ct, was given IV vancomycin/zosyn.     1. L foot ulcer w/ acute OM  - MRI reviewed, podiatry eval appreciated  - plan for further debridement in OR  - on vancomycin [id number], check trough prior to 4th dose for mrsa coverage #2  - on zosyn [id number] for mssa/strep/gnr/anaroebic coverage #2  - continue with antibiotic coverage  - blood cx no growth  - tolerating abx well so far; no side effects noted.  -reason for abx use and side effects reviewed with patient  - supportive care  - f/u cbc    2. other issues - care per medicine

## 2018-05-08 NOTE — PROGRESS NOTE ADULT - ASSESSMENT
57F with non-healing foot ulcer, r/o OM    # Non-healing foot ulcer,   # Acute Osteomyelitis  - admit to medicine  - IV Vanco and Zosyn  - ID eval appreicated  - MRI noted  - Podiatry Dr. Bermudez  - prn pain meds    # Pre-op cardiac risk eval  - Estimated risk probability for perioperative MI or Cardiac arrest is 0.13%.  Ok to proceed with planned procedure per podiatry.    # DM2:  - A1C 10.7  - hold home oral medications  - Lantus increased to 15u - will titrate as needed  - ISS ac/hs  - diabetic diet    # HTN:  -c/w ACE-I    # HLD:  - c/w statin    # DVT Px:  -Heparin sq

## 2018-05-08 NOTE — BRIEF OPERATIVE NOTE - OPERATION/FINDINGS
Weak, unhealthy bone noted of the base of the proximal phalanx of the left foot; antibiotic beads (vancomycin + tobramycin) applied to surgical and ulcer sites.

## 2018-05-08 NOTE — PROGRESS NOTE ADULT - ASSESSMENT
56 y/o female is seen and evaluated for:    1. Acute osteomyelitis of the first proximal phalanx, left foot   2. Acute osteomyelitis of the tibial and fibular sesamoids, left foot   3. Full thickness ulceration Ogden Grade 1; submetatarsal head 1, left foot   4. Onychomycosis x 2, right foot   5. DM type II    6. Pes planus, left foot  7. s/p left ankle fusion    P:  Pt is seen and evaluated; plan d/w Dr. Bermudez   Discussed with patient the diagnosis and the treatment   Flushed the left foot ulcer with normal saline   Applied wet to dry dressing to the left foot ulcer   MRI of Left foot reviewed   For podiatric surgical procedure I& D with bone excision at left 1st MPJ and possible antibiotics beads application today   Pt is NPO   Medically cleared for surgery   Recommend IV abx per ID appreciated  Partial weight bearing with CAM boot to the left foot   Podiatry will follow while in house

## 2018-05-08 NOTE — BRIEF OPERATIVE NOTE - PROCEDURE
<<-----Click on this checkbox to enter Procedure Excision of bone  05/08/2018  Excision of the base of the proximal phalanx of the left foot hallux due to acute osteomyelitis  Active  PHAMPAPUR  Excision of tibial sesamoid of left foot  05/08/2018  Excision of left foot tibial sesamoid  Active  PHAMPAPUR  Incision and drainage of foot  05/08/2018  Incision and drainage of left foot  Active  PHAMPAPUR

## 2018-05-08 NOTE — CHART NOTE - NSCHARTNOTEFT_GEN_A_CORE
Date of Service: 5/8/18    [PRE-OP NOTE]    Surgeon: Dr. Bermudez   Date of Procedure: 5/8/18     Pre-op diagnosis: Acute osteomyelitis of the first proximal phalanx, tibial and fibular sesamoids with full thickness ulceration Ogden Grade 1 at submetatarsal head 1, left foot   Procedure: Incision and drainage with bone excision at left 1st MPJ and possible antibiotics bead application, left foot  HPI: 58 y/o female with PMHx of HTN, HLD, DM, TIA, PSHx of ankle fusion (2009), left 2nd toe amputation presents with c/o left great toe ulcer sent in by Dr. Bermudez for admission to treat osteomyelitis at the hallux sesamoid. Pt states she got outpatient MRI done recently which shows bone infection underneath the ulcer. Pt states she has been having the ulcer at the bottom of her left big toe since july 2017 and she has been seeing Dr. Bermudez regularly. Pt states Dr. Bermudez has been debriding it and apply medihoney. Pt states she has been changing the dressing by herself daily. Pt states it has been getting smaller. Pt denies seeing any pus or drainage from the wound. Pt reports pain at the ulcer upon weight bearing. Pt denies any fever, chills, nausea, vomiting, SOB, chest pain. Pt was found to have acute osteomyelitis at left hallux proximal phalanx and tibial and fibular sesamoids. Discussed at length with the pt about the treatment options and the patient agreed upon the procedure incision and drainage with bone excision at left 1st MPJ and possible antibiotics bead application, left foot.       PMH: DM (diabetes mellitus), HLD (hyperlipidemia), HTN (hypertension).  Allergy: sulfADIAZINE (Hives)    Vitals: Stable    PE:   Constitutional: awake, alert; NAD  Extremities:   Derm: Ulceration measuring 1.8cm x 1.5cm is noted at the left first submetatarsal head with granular wound base, positive malodor, positive undermining, no pus, no active drainage, no probe to bone, no fluctuance, no crepitus; periwound: negative erythema, positive hyperkeratosis, mild maceration. The remainder of the foot and the right foot have to open lesion, no discoloration, no signs of acute injury or infection. No interdigital maceration b/l. Nails are dystrophic at right hallux and right 2nd toe and elongated x 5 in all right foot.    Vasc: pedal pulses DP and PT palpable b/l. CFT immediate in all digit x 10. Foot are warm to touch b/l.   Neuro: light touch and protective sensation intact b/l.   Ortho: pain upon palpation of the left submet 1 ulcer. No pain upon palpation and ROM of the remainder of the foot b/l. Pedal compartments are soft and supple. Negative Hollins's and Reza's signs b/l. Rigid left ankle and STJ.   Partial 2nd toe amputation, left foot.   Pes planus, left foot.              RADIOLOGY:   Left foot MRI (5/7/2018): Acute osteomyelitis of the plantar base of the first proximal phalanx extending to the proximal shaft in the setting of a chronic posttraumatic deformity. Marrow signal alteration within the tibial and fibular sesamoids also concerning for acute osteomyelitis.  Chest X-ray (5/7/2018): No acute pathology   EKG: Completed  Consent: To be signed   NPO since 8am  Medical clearance provided by medical team; appreciated.  Pt and plan discussed with attending Dr. Bermudez. Pt scheduled for podiatric surgical intervention on 5/8/18 as above procedure. Plan discussed with patient. Benefits, risks, alternatives discussed with patient in layman’s terms. All questions answered. No guarantees made or implied about the outcome of the procedure. This is a limb salvage procedure. This is a staged procedure. No contraindications to surgery at this time.

## 2018-05-08 NOTE — PROGRESS NOTE ADULT - SUBJECTIVE AND OBJECTIVE BOX
Date of service: 05-08-18 @ 11:16    56 y/o F with PMHx of HTN, HLD, DM, TIA, PSHx of ankle fusion (2009), left 2nd toe amputation presents with c/o non-healing foot ulcer, sent in by Dr. Bermudez to r/o OM. Pt states she got outpatient MRI done recently which shows bone infection underneath the ulcer. Ulcer present since July 2017 and she has been seeing Dr. Bermudez regularly for treatment. Pt reports pain at the ulcer upon weight bearing. Pt denies any fever, chills, nausea, vomiting, SOB, chest pain. Here afebrile, normal wbc ct, was given IV vancomycin/zosyn.     pt seen and examined  feels better  no fevers  plan for OR later today      ROS: no fever or chills; denies dizziness, no HA, no SOB or cough, no abdominal pain, no diarrhea or constipation; no dysuria, no urinary frequency    MEDICATIONS  (STANDING):  atorvastatin 20 milliGRAM(s) Oral at bedtime  dextrose 5%. 1000 milliLiter(s) (50 mL/Hr) IV Continuous <Continuous>  dextrose 50% Injectable 12.5 Gram(s) IV Push once  dextrose 50% Injectable 25 Gram(s) IV Push once  dextrose 50% Injectable 25 Gram(s) IV Push once  heparin  Injectable 5000 Unit(s) SubCutaneous every 8 hours  insulin glargine Injectable (LANTUS) 15 Unit(s) SubCutaneous every morning  insulin lispro (HumaLOG) corrective regimen sliding scale   SubCutaneous three times a day before meals  insulin lispro (HumaLOG) corrective regimen sliding scale   SubCutaneous at bedtime  lisinopril 10 milliGRAM(s) Oral daily  piperacillin/tazobactam IVPB. 3.375 Gram(s) IV Intermittent every 8 hours  sodium chloride 0.9%. 1000 milliLiter(s) (50 mL/Hr) IV Continuous <Continuous>  vancomycin  IVPB 1000 milliGRAM(s) IV Intermittent every 12 hours        Vital Signs Last 24 Hrs  T(C): 36.9 (08 May 2018 05:00), Max: 36.9 (07 May 2018 16:34)  T(F): 98.5 (08 May 2018 05:00), Max: 98.5 (08 May 2018 05:00)  HR: 80 (08 May 2018 05:00) (80 - 86)  BP: 117/58 (08 May 2018 05:00) (111/84 - 126/66)  BP(mean): --  RR: 18 (08 May 2018 05:00) (16 - 128)  SpO2: 97% (08 May 2018 05:00) (97% - 99%)          PE:    Constitutional: frail looking  HEENT: NC/AT, EOMI, PERRLA, conjunctivae clear; ears and nose atraumatic; pharynx benign  Neck: supple; thyroid not palpable  Back: no tenderness  Respiratory: respiratory effort normal; clear to auscultation  Cardiovascular: S1S2 regular, no murmurs  Abdomen: soft, not tender, not distended, positive BS; liver and spleen WNL  Genitourinary: no suprapubic tenderness  Lymphatic: no LN palpable  Musculoskeletal: no muscle tenderness, no joint swelling or tenderness  Extremities: no pedal edema  Neurological/ Psychiatric: AxOx3, Judgement and insight normal; moving all extremities  Skin: L plantar aspect of 1st toe with 3 cm ulcer with surrounding warmth    Labs: all available labs reviewed                                   11.0   5.04  )-----------( 324      ( 07 May 2018 06:02 )             36.0     05-07    141  |  107  |  19  ----------------------------<  238<H>  4.1   |  27  |  0.79    Ca    8.9      07 May 2018 06:02    TPro  8.0  /  Alb  3.4  /  TBili  <0.1<L>  /  DBili  x   /  AST  15  /  ALT  22  /  AlkPhos  94  05-06           Cultures:   Culture - Blood (05.06.18 @ 18:12)    Specimen Source: .Blood Blood-Peripheral    Culture Results:   No growth to date.            Radiology: all available radiological tests reviewed  < from: MR Foot No Cont, Left (05.07.18 @ 14:34) >    EXAM:  MR FOOT LT                            PROCEDURE DATE:  05/07/2018          INTERPRETATION:  History: Chronic first set metatarsal ulcer. Concern for   osteomyelitis.    Multiplanar multisequence MRI of the left foot was performed from the   level of the toes to the level of the and navicular cuneiform   articulation.    Correlation is made with prior radiograph of the left foot from October 2, 2016.    Findings:    There is a broad plantar subcutaneous wound at the level of the first   metatarsal phalangeal joint measuring approximately 0.9 cm and an   anteroposterior dimension of approximately 1.2 cm in transverse   dimension. There is surrounding skin thickening and hypointense signal   consistent with granulation tissue. There is mild edema within the   subcutaneous fat consistent with cellulitis.    There is chronic posttraumatic deformity of the first proximal phalanx   with the plantar aspect of the base of the first proximal phalanx   extending along the undersurface of the first metatarsal head. There is   osseous edema and hypointense T1 signal along the plantar base of the   first proximal phalanx extending to the proximal shaft consistent with   acute osteomyelitis. Mild osseous edema and vague hypointense T1 signal   is also noted within both the tibial and fibular sesamoids also   concerning for early osteomyelitis.    Patient is status post amputation of the middle and distal phalanges of   the second toe marrow signal within the remainder of the foot is   otherwise preserved. There is mild hallux valgus with mild first   metatarsophalangeal and hallux sesamoid joint arthrosis. Dorsal spurring   is noted at the first through third tarsometatarsal and navicular   cuneiform articulations.    Visualized tendinous structures are intact without tenosynovitis or   tearing. There is extensive fatty atrophy of the plantar muscles   consistent with denervation related change. The Lisfranc ligament is   intact.    Impression:    Plantar wound at the level ofthe first metatarsal phalangeal joint.   Acute osteomyelitis of the plantar base of the first proximal phalanx   extending to the proximal shaft in the setting of a chronic posttraumatic   deformity. Marrow signal alteration within the tibial and fibular   sesamoids also concerning for acute osteomyelitis.    < end of copied text >      < from: Xray Chest 1 View AP/PA. (05.06.18 @ 19:42) >    EXAM:  XR CHEST AP OR PA 1V                            PROCEDURE DATE:  05/06/2018          INTERPRETATION:  AP chest on May 6, 2018. Patient is preop and has   osteomyelitis.    Heart size is within normal limits.    The lung fields and pleural surfaces are unremarkable except for minimal   linear scar like area left lower lung field. Left PICC line seen on   February 23, 2016 is no longer evident.    Deviation of the trachea to the right at the thoracic inlet suggesting   left lobe thyroid enlargement is unchanged.    IMPRESSION: Probable left thyroid enlargement again noted. Minimal left   lung scar. Prior PICC line removed.        Advanced directives addressed: full resuscitation

## 2018-05-08 NOTE — CHART NOTE - NSCHARTNOTEFT_GEN_A_CORE
Date of Service: 05-08-18 @ 22:15    Podiatry Post-Op Check    58 y/o female is seen for postop check #1 s/p left foot incision and drainage with excision of the base of proximal phalanx of the left hallux and excision of the left foot tibial sesamoid earlier this evening (DOS 5/8/18). Pt is resting comfortably in bed and in NAD. Pt states she has minimal pain on the top of her left foot. Pt denies any other pedal pain. Pt denies any f/c/n/v/sob/headache/chest pain/abdominal pain.    Vital Signs Last 24 Hrs  T(C): 36.6 (08 May 2018 21:33), Max: 37.2 (08 May 2018 11:36)  T(F): 97.8 (08 May 2018 21:33), Max: 98.9 (08 May 2018 11:36)  HR: 78 (08 May 2018 21:33) (74 - 80)  BP: 103/61 (08 May 2018 21:33) (103/61 - 153/84)  BP(mean): --  RR: 17 (08 May 2018 21:33) (14 - 19)  SpO2: 99% (08 May 2018 21:33) (97% - 100%)    PE: Pt is AAOx3, NAD, pleasant manner  Surgical dressing on the left foot is clean, dry and intact. No strikethrough noted. Pt able to actively move digits of the left foot. CFT noted to be immediate to the digits of the left foot. Left lower leg compartment is soft and nontender. No pain noted upon left calf squeeze.    Plan:  Pt is seen and evaluated; plan d/w attending Dr. Bermudez.  Postop left foot xrays reviewed; official report pending.  Surgical dressing on the left foot left intact during this visit.  Encouraged pt to elevate left foot.  No ice for the left foot.  Pain medications PRN.  Nursing order placed to reinforce dressing as needed.  Pt instructed to stay NWB on the left foot for the next 12-24 hours. Bedrest order placed; patient may use bedside commode with nursing assistance and without WB on the left foot.  Recommend long term IVabx with PICC per ID recommendations.  Care per Medicine, appreciated.  Podiatry will follow while in house.

## 2018-05-08 NOTE — PROGRESS NOTE ADULT - SUBJECTIVE AND OBJECTIVE BOX
Date of Service: 05-08-18    58 y/o female with PMHx of HTN, HLD, DM, TIA, PSHx of ankle fusion (2009), left 2nd toe amputation presents with c/o left great toe ulcer sent in by Dr. Bermudez for admission to treat osteomyelitis at the hallux sesamoid. Pt states she got outpatient MRI done recently which shows bone infection underneath the ulcer. Pt states she has been having the ulcer at the bottom of her left big toe since july 2017 and she has been seeing Dr. Bermudez regularly. Pt states Dr. Bermudez has been debriding it and apply medihoney. Pt states she has been changing the dressing by herself daily. Pt states it has been getting smaller. Pt denies seeing any pus or drainage from the wound. Pt reports pain at the ulcer upon weight bearing. Pt denies any fever, chills, nausea, vomiting, SOB, chest pain.       Today 5/8/18: Pt is seen and evaluated at bedside for f/u care of chronic left hallux ulcer and OM at left 1st MPJ. Pt is resting comfortably in bed and in NAD. Pt denies any pedal pain or discomfort. Pt denies f/c/n/v/SOB/chest pain. Pt is for podiatric surgical I & D and bone excision at left 1st MPJ today.      Past Medical History:  DM (diabetes mellitus)    HLD (hyperlipidemia)    HTN (hypertension).    Allergies  sulfADIAZINE (Hives)    MEDICATIONS  (STANDING):  atorvastatin 20 milliGRAM(s) Oral at bedtime  dextrose 5%. 1000 milliLiter(s) (50 mL/Hr) IV Continuous <Continuous>  dextrose 50% Injectable 12.5 Gram(s) IV Push once  dextrose 50% Injectable 25 Gram(s) IV Push once  dextrose 50% Injectable 25 Gram(s) IV Push once  heparin  Injectable 5000 Unit(s) SubCutaneous every 8 hours  insulin glargine Injectable (LANTUS) 15 Unit(s) SubCutaneous every morning  insulin lispro (HumaLOG) corrective regimen sliding scale   SubCutaneous three times a day before meals  insulin lispro (HumaLOG) corrective regimen sliding scale   SubCutaneous at bedtime  lisinopril 10 milliGRAM(s) Oral daily  piperacillin/tazobactam IVPB. 3.375 Gram(s) IV Intermittent every 8 hours  vancomycin  IVPB 1000 milliGRAM(s) IV Intermittent every 12 hours    MEDICATIONS  (PRN):  acetaminophen   Tablet. 650 milliGRAM(s) Oral every 6 hours PRN Mild Pain (1 - 3) or fever, temp >100.4  dextrose Gel 1 Dose(s) Oral once PRN Blood Glucose LESS THAN 70 milliGRAM(s)/deciliter  glucagon  Injectable 1 milliGRAM(s) IntraMuscular once PRN Glucose LESS THAN 70 milligrams/deciliter    Vital Signs Last 24 Hrs  T(C): 36.9 (08 May 2018 05:00), Max: 36.9 (07 May 2018 16:34)  T(F): 98.5 (08 May 2018 05:00), Max: 98.5 (08 May 2018 05:00)  HR: 80 (08 May 2018 05:00) (80 - 86)  BP: 117/58 (08 May 2018 05:00) (111/84 - 126/66)  BP(mean): --  RR: 18 (08 May 2018 05:00) (16 - 128)  SpO2: 97% (08 May 2018 05:00) (97% - 99%)    PHYSICAL EXAM:  Constitutional: AAOx3, NAD, comfortable  Extremities:   Derm: Ulceration measuring 1.8cm x 1.5cm is noted at the left first submetatarsal head with granular wound base, positive malodor, positive undermining, no pus, no active drainage, no probe to bone, no fluctuance, no crepitus; periwound: negative erythema, positive hyperkeratosis, mild maceration. The remainder of the foot and the right foot have to open lesion, no discoloration, no signs of acute injury or infection. No interdigital maceration b/l. Nails are dystrophic at right hallux and right 2nd toe and elongated x 5 in all right foot.    Vasc: pedal pulses DP and PT palpable b/l. CFT immediate in all digit x 10. Foot are warm to touch b/l.   Neuro: light touch and protective sensation intact b/l.   Ortho: pain upon palpation of the left submet 1 ulcer. No pain upon palpation and ROM of the remainder of the foot b/l. Pedal compartments are soft and supple. Negative Hollins's and Reza's signs b/l. Rigid left ankle and STJ.   Partial 2nd toe amputation, left foot.   Pes planus, left foot.     LABS:                 11.0   5.04  )-----------( 324      ( 07 May 2018 06:02 )             36.0     05-07    141  |  107  |  19  ----------------------------<  238<H>  4.1   |  27  |  0.79    Ca    8.9      07 May 2018 06:02    TPro  8.0  /  Alb  3.4  /  TBili  <0.1<L>  /  DBili  x   /  AST  15  /  ALT  22  /  AlkPhos  94  05-06      Culture - Blood (05.06.18 @ 18:12)    Specimen Source: .Blood Blood-Peripheral    Culture Results:   No growth to date.    Culture - Blood (05.06.18 @ 18:12)    Specimen Source: .Blood Blood-Peripheral    Culture Results:   No growth to date.    RADIOLOGY:      < from: MR Foot No Cont, Left (05.07.18 @ 14:34) >    EXAM:  MR FOOT LT                            PROCEDURE DATE:  05/07/2018          INTERPRETATION:  History: Chronic first set metatarsal ulcer. Concern for   osteomyelitis.    Multiplanar multisequence MRI of the left foot was performed from the   level of the toes to the level of the and navicular cuneiform   articulation.    Correlation is made with prior radiograph of the left foot from October 2, 2016.    Findings:    There is a broad plantar subcutaneous wound at the level of the first   metatarsal phalangeal joint measuring approximately 0.9 cm and an   anteroposterior dimension of approximately 1.2 cm in transverse   dimension. There is surrounding skin thickening and hypointense signal   consistent with granulation tissue. There is mild edema within the   subcutaneous fat consistent with cellulitis.    There is chronic posttraumatic deformity of the first proximal phalanx   with the plantar aspect of the base of the first proximal phalanx   extending along the undersurface of the first metatarsal head. There is   osseous edema and hypointense T1 signal along the plantar base of the   first proximal phalanx extending to the proximal shaft consistent with   acute osteomyelitis. Mild osseous edema and vague hypointense T1 signal   is also noted within both the tibial and fibular sesamoids also   concerning for early osteomyelitis.    Patient is status post amputation of the middle and distal phalanges of   the second toe marrow signal within the remainder of the foot is   otherwise preserved. There is mild hallux valgus with mild first   metatarsophalangeal and hallux sesamoid joint arthrosis. Dorsal spurring   is noted at the first through third tarsometatarsal and navicular   cuneiform articulations.    Visualized tendinous structures are intact without tenosynovitis or   tearing. There is extensive fatty atrophy of the plantar muscles   consistent with denervation related change. The Lisfranc ligament is   intact.    Impression:    Plantar wound at the level of the first metatarsal phalangeal joint.   Acute osteomyelitis of the plantar base of the first proximal phalanx   extending to the proximal shaft in the setting of a chronic posttraumatic   deformity. Marrow signal alteration within the tibial and fibular   sesamoids also concerning for acute osteomyelitis.

## 2018-05-08 NOTE — PROGRESS NOTE ADULT - SUBJECTIVE AND OBJECTIVE BOX
CHIEF COMPLAINT: nonhealing ulcer    SUBJECTIVE: no new complaints. planned for OR this afternoon. BG very high.    REVIEW OF SYSTEMS: All other review of systems is negative unless indicated above    Vital Signs Last 24 Hrs  T(C): 36.9 (08 May 2018 05:00), Max: 36.9 (07 May 2018 16:34)  T(F): 98.5 (08 May 2018 05:00), Max: 98.5 (08 May 2018 05:00)  HR: 80 (08 May 2018 05:00) (80 - 86)  BP: 117/58 (08 May 2018 05:00) (111/84 - 126/66)  RR: 18 (08 May 2018 05:00) (16 - 128)  SpO2: 97% (08 May 2018 05:00) (97% - 99%)    PHYSICAL EXAM:              Constitutional: NAD, awake and alert, well-developed  	HEENT: PERR, EOMI, Normal Hearing, MMM  	Neck: Soft and supple, No LAD, No JVD  	Respiratory: Breath sounds are clear bilaterally, No wheezing, rales or rhonchi  	Cardiovascular: S1 and S2, regular rate and rhythm, no Murmurs, gallops or rubs  	Gastrointestinal: Bowel Sounds present, soft, nontender, nondistended, no guarding, no rebound  	Extremities: No peripheral edema  	Vascular: 2+ peripheral pulses  	Neurological: A/O x 3, no focal deficits  	Musculoskeletal: 5/5 strength b/l upper and lower extremities              Skin: non-healing ulcer to base of 1st digit, sole of foot    MEDICATIONS:  MEDICATIONS  (STANDING):  atorvastatin 20 milliGRAM(s) Oral at bedtime  dextrose 5%. 1000 milliLiter(s) (50 mL/Hr) IV Continuous <Continuous>  dextrose 50% Injectable 12.5 Gram(s) IV Push once  dextrose 50% Injectable 25 Gram(s) IV Push once  dextrose 50% Injectable 25 Gram(s) IV Push once  heparin  Injectable 5000 Unit(s) SubCutaneous every 8 hours  insulin glargine Injectable (LANTUS) 15 Unit(s) SubCutaneous every morning  insulin lispro (HumaLOG) corrective regimen sliding scale   SubCutaneous three times a day before meals  insulin lispro (HumaLOG) corrective regimen sliding scale   SubCutaneous at bedtime  lisinopril 10 milliGRAM(s) Oral daily  piperacillin/tazobactam IVPB. 3.375 Gram(s) IV Intermittent every 8 hours  vancomycin  IVPB 1000 milliGRAM(s) IV Intermittent every 12 hours    LABS: All Labs Reviewed:                        11.0   5.04  )-----------( 324      ( 07 May 2018 06:02 )             36.0     141  |  107  |  19  ----------------------------<  238<H>  4.1   |  27  |  0.79    Ca    8.9      07 May 2018 06:02    TPro  8.0  /  Alb  3.4  /  TBili  <0.1<L>  /  DBili  x   /  AST  15  /  ALT  22  /  AlkPhos  94  05-06    PT/INR - ( 07 May 2018 06:02 )   PT: 9.6 sec;   INR: 0.89 ratio    PTT - ( 06 May 2018 18:12 )  PTT:29.3 sec    Blood Culture: 05-06 @ 18:12  Organism --  Gram Stain Blood -- Gram Stain --  Specimen Source .Blood Blood-Peripheral  Culture-Blood --

## 2018-05-08 NOTE — BRIEF OPERATIVE NOTE - POST-OP DX
Acute osteomyelitis of foot  05/08/2018  Acute osteomyelitis of the the left foot hallux  Active  Makeda Rogers

## 2018-05-09 RX ORDER — PIPERACILLIN AND TAZOBACTAM 4; .5 G/20ML; G/20ML
3.38 INJECTION, POWDER, LYOPHILIZED, FOR SOLUTION INTRAVENOUS EVERY 8 HOURS
Qty: 0 | Refills: 0 | Status: DISCONTINUED | OUTPATIENT
Start: 2018-05-09 | End: 2018-05-10

## 2018-05-09 RX ORDER — INSULIN GLARGINE 100 [IU]/ML
22 INJECTION, SOLUTION SUBCUTANEOUS EVERY MORNING
Qty: 0 | Refills: 0 | Status: DISCONTINUED | OUTPATIENT
Start: 2018-05-09 | End: 2018-05-11

## 2018-05-09 RX ORDER — VANCOMYCIN HCL 1 G
1000 VIAL (EA) INTRAVENOUS EVERY 12 HOURS
Qty: 0 | Refills: 0 | Status: DISCONTINUED | OUTPATIENT
Start: 2018-05-09 | End: 2018-05-10

## 2018-05-09 RX ADMIN — HEPARIN SODIUM 5000 UNIT(S): 5000 INJECTION INTRAVENOUS; SUBCUTANEOUS at 05:35

## 2018-05-09 RX ADMIN — HEPARIN SODIUM 5000 UNIT(S): 5000 INJECTION INTRAVENOUS; SUBCUTANEOUS at 22:12

## 2018-05-09 RX ADMIN — Medication 2: at 22:19

## 2018-05-09 RX ADMIN — PIPERACILLIN AND TAZOBACTAM 25 GRAM(S): 4; .5 INJECTION, POWDER, LYOPHILIZED, FOR SOLUTION INTRAVENOUS at 13:17

## 2018-05-09 RX ADMIN — Medication 650 MILLIGRAM(S): at 18:06

## 2018-05-09 RX ADMIN — OXYCODONE HYDROCHLORIDE 10 MILLIGRAM(S): 5 TABLET ORAL at 03:56

## 2018-05-09 RX ADMIN — Medication 650 MILLIGRAM(S): at 11:55

## 2018-05-09 RX ADMIN — INSULIN GLARGINE 15 UNIT(S): 100 INJECTION, SOLUTION SUBCUTANEOUS at 08:12

## 2018-05-09 RX ADMIN — Medication 6: at 08:11

## 2018-05-09 RX ADMIN — SODIUM CHLORIDE 75 MILLILITER(S): 9 INJECTION INTRAMUSCULAR; INTRAVENOUS; SUBCUTANEOUS at 05:53

## 2018-05-09 RX ADMIN — Medication 250 MILLIGRAM(S): at 18:05

## 2018-05-09 RX ADMIN — LISINOPRIL 10 MILLIGRAM(S): 2.5 TABLET ORAL at 05:35

## 2018-05-09 RX ADMIN — Medication 4: at 11:54

## 2018-05-09 RX ADMIN — ATORVASTATIN CALCIUM 20 MILLIGRAM(S): 80 TABLET, FILM COATED ORAL at 22:12

## 2018-05-09 RX ADMIN — PIPERACILLIN AND TAZOBACTAM 25 GRAM(S): 4; .5 INJECTION, POWDER, LYOPHILIZED, FOR SOLUTION INTRAVENOUS at 22:12

## 2018-05-09 RX ADMIN — Medication 6: at 18:06

## 2018-05-09 RX ADMIN — HEPARIN SODIUM 5000 UNIT(S): 5000 INJECTION INTRAVENOUS; SUBCUTANEOUS at 13:17

## 2018-05-09 RX ADMIN — Medication 650 MILLIGRAM(S): at 05:37

## 2018-05-09 NOTE — PROGRESS NOTE ADULT - ASSESSMENT
57F with non-healing foot ulcer, r/o OM    # Non-healing foot ulcer,   # Acute Osteomyelitis  - admit to medicine  - cw abx per ID. plan for long term abx and PICC  - f/u OR cx  - MRI noted  - Podiatry appreicated  - prn pain meds    # DM2:  - A1C 10.7  - hold home oral medications  - Lantus increased to 22u - will titrate as needed  - ISS ac/hs  - diabetic diet    # HTN:  -c/w ACE-I    # HLD:  - c/w statin    # DVT Px:  -Heparin sq

## 2018-05-09 NOTE — PROGRESS NOTE ADULT - ASSESSMENT
58 y/o F with PMHx of HTN, HLD, DM, TIA, PSHx of ankle fusion (2009), left 2nd toe amputation presents with c/o non-healing foot ulcer, sent in by Dr. Bermudez to r/o OM. Pt states she got outpatient MRI done recently which shows bone infection underneath the ulcer. Ulcer present since July 2017 and she has been seeing Dr. Bermudez regularly for treatment. Pt reports pain at the ulcer upon weight bearing. Pt denies any fever, chills, nausea, vomiting, SOB, chest pain. Here afebrile, normal wbc ct, was given IV vancomycin/zosyn.     1. L foot ulcer w/ acute OM  - MRI reviewed, podiatry eval appreciated  - s/p L foot I and D/bone exision/abx bead placement along proximal phalanx  - on vancomycin trough low, increase to [id number] #3  - on zosyn [id number] for mssa/strep/gnr/anaroebic coverage #3  - continue with antibiotic coverage  - f/u OR cultures  - tailor abx regimen based on cx results  - blood cx no growth  - plan for eventual PICC line and 6 weeks of IV abx  - tolerating abx well so far; no side effects noted.  -reason for abx use and side effects reviewed with patient  - supportive care  - f/u cbc    2. other issues - care per medicine

## 2018-05-09 NOTE — PROGRESS NOTE ADULT - ASSESSMENT
56 y/o female s/p left foot incision and drainage with excision of the base of proximal phalanx of the left hallux and excision of the left foot tibial sesamoid (DOS 5/8/18) is seen and evaluated for:    1. Acute osteomyelitis of the first proximal phalanx, left foot   2. Acute osteomyelitis of the tibial and fibular sesamoids, left foot   3. Full thickness ulceration Ogden Grade 1; submetatarsal head 1, left foot   4. Onychomycosis x 2, right foot   5. DM type II    6. Pes planus, left foot  7. s/p left ankle fusion    Pt is seen and evaluated; plan d/w attending Dr. Bermudez.  Postop left foot xrays reviewed; official report pending.  Surgical dressing on the left foot left intact during this visit.  Encouraged pt to elevate left foot.  No ice for the left foot.  Pain medications PRN.  Nursing order placed to reinforce dressing as needed.  Pt instructed to stay NWB on the left foot for the next 12-24 hours. Bedrest order placed; patient may use bedside commode with nursing assistance and without WB on the left foot.   Recommend long term IVabx with PICC per ID recommendations.  Care per Medicine, appreciated.  Podiatry will follow while in house.

## 2018-05-09 NOTE — PROGRESS NOTE ADULT - ATTENDING COMMENTS
Agree with Podiatry assestment and plan, local wound care adaptic dsd, spoke with ID and recommend long term IV abx, recommend home wound care of adaptic dsd to left foot every other day. Cultures pending, podiatry to follow.

## 2018-05-09 NOTE — PROGRESS NOTE ADULT - SUBJECTIVE AND OBJECTIVE BOX
CHIEF COMPLAINT: nonhealing ulcer    SUBJECTIVE: s/p OR POD 1.  Difficulty ambulating on heels. BG still high.    REVIEW OF SYSTEMS: All other review of systems is negative unless indicated above    Vital Signs Last 24 Hrs  T(C): 37.9 (09 May 2018 16:51), Max: 37.9 (09 May 2018 16:51)  T(F): 100.2 (09 May 2018 16:51), Max: 100.2 (09 May 2018 16:51)  HR: 90 (09 May 2018 16:51) (76 - 91)  BP: 131/81 (09 May 2018 16:51) (103/61 - 141/74)  RR: 18 (09 May 2018 16:51) (14 - 18)  SpO2: 98% (09 May 2018 16:51) (98% - 100%)    PHYSICAL EXAM:              Constitutional: NAD, awake and alert, well-developed  	HEENT: PERR, EOMI, Normal Hearing, MMM  	Neck: Soft and supple, No LAD, No JVD  	Respiratory: Breath sounds are clear bilaterally, No wheezing, rales or rhonchi  	Cardiovascular: S1 and S2, regular rate and rhythm, no Murmurs, gallops or rubs  	Gastrointestinal: Bowel Sounds present, soft, nontender, nondistended, no guarding, no rebound  	Extremities: No peripheral edema  	Vascular: 2+ peripheral pulses  	Neurological: A/O x 3, no focal deficits  	Musculoskeletal: 5/5 strength b/l upper and lower extremities              Skin: non-healing ulcer to base of 1st digit, sole of foot    MEDICATIONS:  MEDICATIONS  (STANDING):  acetaminophen   Tablet 650 milliGRAM(s) Oral every 6 hours  atorvastatin 20 milliGRAM(s) Oral at bedtime  dextrose 5%. 1000 milliLiter(s) (50 mL/Hr) IV Continuous <Continuous>  dextrose 50% Injectable 12.5 Gram(s) IV Push once  dextrose 50% Injectable 25 Gram(s) IV Push once  dextrose 50% Injectable 25 Gram(s) IV Push once  heparin  Injectable 5000 Unit(s) SubCutaneous every 8 hours  insulin glargine Injectable (LANTUS) 22 Unit(s) SubCutaneous every morning  insulin lispro (HumaLOG) corrective regimen sliding scale   SubCutaneous three times a day before meals  insulin lispro (HumaLOG) corrective regimen sliding scale   SubCutaneous at bedtime  lisinopril 10 milliGRAM(s) Oral daily  piperacillin/tazobactam IVPB. 3.375 Gram(s) IV Intermittent every 8 hours  sodium chloride 0.9%. 1000 milliLiter(s) (50 mL/Hr) IV Continuous <Continuous>  vancomycin  IVPB 1000 milliGRAM(s) IV Intermittent every 12 hours    LABS: All Labs Reviewed:                        10.1   8.52  )-----------( 309      ( 09 May 2018 07:19 )             31.8     141  |  108  |  17  ----------------------------<  271<H>  4.6   |  26  |  0.77    Ca    8.5      09 May 2018 07:19    Blood Culture: 05-08 @ 17:52  Organism --  Gram Stain Blood -- Gram Stain   No polymorphonuclear leukocytes seen per low power field  No organisms seen  Specimen Source .Tissue lft first mpj bone cx  Culture-Blood --    05-06 @ 18:12  Organism --  Gram Stain Blood -- Gram Stain --  Specimen Source .Blood Blood-Peripheral  Culture-Blood --

## 2018-05-09 NOTE — PROGRESS NOTE ADULT - SUBJECTIVE AND OBJECTIVE BOX
Date of Service: 05-09-18 @ 08:54    58 y/o female presents with c/o left great toe ulcer sent in by Dr. Bermudez for admission to treat osteomyelitis at the hallux sesamoid. Pt states she got outpatient MRI done recently which shows bone infection underneath the ulcer. Pt states she has been having the ulcer at the bottom of her left big toe since july 2017 and she has been seeing Dr. Bermudez regularly. Pt states Dr. Bermudez has been debriding it and performing local wound care with medihoney. Pt states she has been changing the dressing by herself daily. Pt states it has been getting smaller. Pt denies seeing any pus or drainage from the wound. Pt reports pain at the ulcer upon weight bearing. Pt denies any fever, chills, nausea, vomiting, SOB, chest pain.       Today 5/9/18: Pt is POD#1 s/p left foot incision and drainage with excision of the base of proximal phalanx of the left hallux and excision of the left foot tibial sesamoid (DOS 5/8/18). Pt is resting comfortably in bed and in NAD. Pt states the pain in her left foot is being managed by pain medication. Pt states she has been off of her left foot since the surgery.  Pt denies any other pedal pain or discomfort. Pt denies f/c/n/v/SOB/chest pain.     PMH: DM, HTN, HLD, TIA    PSH: ankle fusion (2009), left 2nd toe amputation     MEDICATIONS:  MEDICATIONS  (STANDING):  acetaminophen   Tablet 650 milliGRAM(s) Oral every 6 hours  atorvastatin 20 milliGRAM(s) Oral at bedtime  dextrose 5%. 1000 milliLiter(s) (50 mL/Hr) IV Continuous <Continuous>  dextrose 50% Injectable 12.5 Gram(s) IV Push once  dextrose 50% Injectable 25 Gram(s) IV Push once  dextrose 50% Injectable 25 Gram(s) IV Push once  heparin  Injectable 5000 Unit(s) SubCutaneous every 8 hours  insulin glargine Injectable (LANTUS) 15 Unit(s) SubCutaneous every morning  insulin lispro (HumaLOG) corrective regimen sliding scale   SubCutaneous three times a day before meals  insulin lispro (HumaLOG) corrective regimen sliding scale   SubCutaneous at bedtime  lisinopril 10 milliGRAM(s) Oral daily  piperacillin/tazobactam IVPB. 3.375 Gram(s) IV Intermittent every 8 hours  sodium chloride 0.9%. 1000 milliLiter(s) (50 mL/Hr) IV Continuous <Continuous>  vancomycin  IVPB 1000 milliGRAM(s) IV Intermittent every 12 hours    Allergies: Sulfadiazine    Vital Signs Last 24 Hrs  T(C): 37.2 (09 May 2018 04:49), Max: 37.2 (08 May 2018 11:36)  T(F): 99 (09 May 2018 04:49), Max: 99 (09 May 2018 04:49)  HR: 87 (09 May 2018 04:49) (74 - 87)  BP: 132/53 (09 May 2018 04:49) (103/61 - 153/84)  BP(mean): --  RR: 18 (09 May 2018 04:49) (14 - 19)  SpO2: 99% (09 May 2018 04:49) (99% - 100%)    POCT Blood Glucose.: 263 mg/dL (09 May 2018 08:07)  POCT Blood Glucose.: 235 mg/dL (08 May 2018 21:30)  POCT Blood Glucose.: 95 mg/dL (08 May 2018 19:53)  POCT Blood Glucose.: 223 mg/dL (08 May 2018 12:37)    PHYSICAL EXAM:    Constitutional: AAOx3, NAD, pleasant manner  Surgical dressing on the left foot is clean, dry and intact. No strikethrough noted. Pt able to actively move digits of the left foot. CFT noted to be immediate to the digits of the left foot. Left lower leg compartment is soft and nontender. No pain noted upon left calf squeeze.    LABS: All Labs Reviewed:                        10.1   8.52  )-----------( 309      ( 09 May 2018 07:19 )             31.8     05-09    141  |  108  |  17  ----------------------------<  271<H>  4.6   |  26  |  0.77    Ca    8.5      09 May 2018 07:19      Culture - Blood (05.06.18 @ 18:12)    Specimen Source: .Blood Blood-Peripheral    Culture Results:   No growth to date.    Culture - Blood (05.06.18 @ 18:12)    Specimen Source: .Blood Blood-Peripheral    Culture Results:   No growth to date.    Surgical bone culture and pathology; deep wound culture collected on 5/8/18: pending results    RADIOLOGY/EKG:  Postop left foot xrays (5/8/18): Official radiology report pending. Initial podiatric review shows excision of the base of the proximal phalanx of the left hallux and excision of the tibial sesamoid with antibiotic placement.

## 2018-05-09 NOTE — PROGRESS NOTE ADULT - SUBJECTIVE AND OBJECTIVE BOX
Date of service: 05-09-18 @ 12:51    pt seen and examined  feels better  no fevers  s/p left foot I & D/bone excision/abx bead placement 5/8        ROS: no fever or chills; denies dizziness, no HA, no SOB or cough, no abdominal pain, no diarrhea or constipation; no dysuria, no urinary frequency, no legs pain, no rashes    MEDICATIONS  (STANDING):  acetaminophen   Tablet 650 milliGRAM(s) Oral every 6 hours  atorvastatin 20 milliGRAM(s) Oral at bedtime  dextrose 5%. 1000 milliLiter(s) (50 mL/Hr) IV Continuous <Continuous>  dextrose 50% Injectable 12.5 Gram(s) IV Push once  dextrose 50% Injectable 25 Gram(s) IV Push once  dextrose 50% Injectable 25 Gram(s) IV Push once  heparin  Injectable 5000 Unit(s) SubCutaneous every 8 hours  insulin glargine Injectable (LANTUS) 15 Unit(s) SubCutaneous every morning  insulin lispro (HumaLOG) corrective regimen sliding scale   SubCutaneous three times a day before meals  insulin lispro (HumaLOG) corrective regimen sliding scale   SubCutaneous at bedtime  lisinopril 10 milliGRAM(s) Oral daily  piperacillin/tazobactam IVPB. 3.375 Gram(s) IV Intermittent every 8 hours  sodium chloride 0.9%. 1000 milliLiter(s) (50 mL/Hr) IV Continuous <Continuous>  vancomycin  IVPB 1000 milliGRAM(s) IV Intermittent every 12 hours      Vital Signs Last 24 Hrs  T(C): 37.4 (09 May 2018 10:40), Max: 37.4 (09 May 2018 10:40)  T(F): 99.3 (09 May 2018 10:40), Max: 99.3 (09 May 2018 10:40)  HR: 91 (09 May 2018 10:40) (74 - 91)  BP: 117/58 (09 May 2018 10:40) (103/61 - 153/84)  BP(mean): --  RR: 18 (09 May 2018 10:40) (14 - 19)  SpO2: 99% (09 May 2018 10:40) (99% - 100%)        PE:    Constitutional: frail looking  HEENT: NC/AT, EOMI, PERRLA, conjunctivae clear; ears and nose atraumatic; pharynx benign  Neck: supple; thyroid not palpable  Back: no tenderness  Respiratory: respiratory effort normal; clear to auscultation  Cardiovascular: S1S2 regular, no murmurs  Abdomen: soft, not tender, not distended, positive BS; liver and spleen WNL  Genitourinary: no suprapubic tenderness  Lymphatic: no LN palpable  Musculoskeletal: no muscle tenderness, no joint swelling or tenderness  Extremities: no pedal edema  Neurological/ Psychiatric: AxOx3, Judgement and insight normal; moving all extremities  Skin: L plantar aspect of 1st toe with 3 cm ulcer with surrounding warmth    Labs: all available labs reviewed                        10.1   8.52  )-----------( 309      ( 09 May 2018 07:19 )             31.8     05-09    141  |  108  |  17  ----------------------------<  271<H>  4.6   |  26  |  0.77    Ca    8.5      09 May 2018 07:19         Vancomycin Level, Trough: 8.6 ug/mL (05-08 @ 16:45)      Cultures:   Culture - Blood (05.06.18 @ 18:12)    Specimen Source: .Blood Blood-Peripheral    Culture Results:   No growth to date.      Culture - Tissue with Gram Stain (05.08.18 @ 17:52)    Gram Stain:   No polymorphonuclear leukocytes seen per low power field  No organisms seen    Specimen Source: .Tissue lft first mpj bone cx        Radiology: all available radiological tests reviewed  < from: MR Foot No Cont, Left (05.07.18 @ 14:34) >    EXAM:  MR FOOT LT                            PROCEDURE DATE:  05/07/2018          INTERPRETATION:  History: Chronic first set metatarsal ulcer. Concern for   osteomyelitis.    Multiplanar multisequence MRI of the left foot was performed from the   level of the toes to the level of the and navicular cuneiform   articulation.    Correlation is made with prior radiograph of the left foot from October 2, 2016.    Findings:    There is a broad plantar subcutaneous wound at the level of the first   metatarsal phalangeal joint measuring approximately 0.9 cm and an   anteroposterior dimension of approximately 1.2 cm in transverse   dimension. There is surrounding skin thickening and hypointense signal   consistent with granulation tissue. There is mild edema within the   subcutaneous fat consistent with cellulitis.    There is chronic posttraumatic deformity of the first proximal phalanx   with the plantar aspect of the base of the first proximal phalanx   extending along the undersurface of the first metatarsal head. There is   osseous edema and hypointense T1 signal along the plantar base of the   first proximal phalanx extending to the proximal shaft consistent with   acute osteomyelitis. Mild osseous edema and vague hypointense T1 signal   is also noted within both the tibial and fibular sesamoids also   concerning for early osteomyelitis.    Patient is status post amputation of the middle and distal phalanges of   the second toe marrow signal within the remainder of the foot is   otherwise preserved. There is mild hallux valgus with mild first   metatarsophalangeal and hallux sesamoid joint arthrosis. Dorsal spurring   is noted at the first through third tarsometatarsal and navicular   cuneiform articulations.    Visualized tendinous structures are intact without tenosynovitis or   tearing. There is extensive fatty atrophy of the plantar muscles   consistent with denervation related change. The Lisfranc ligament is   intact.    Impression:    Plantar wound at the level ofthe first metatarsal phalangeal joint.   Acute osteomyelitis of the plantar base of the first proximal phalanx   extending to the proximal shaft in the setting of a chronic posttraumatic   deformity. Marrow signal alteration within the tibial and fibular   sesamoids also concerning for acute osteomyelitis.    < end of copied text >      < from: Xray Chest 1 View AP/PA. (05.06.18 @ 19:42) >    EXAM:  XR CHEST AP OR PA 1V                            PROCEDURE DATE:  05/06/2018          INTERPRETATION:  AP chest on May 6, 2018. Patient is preop and has   osteomyelitis.    Heart size is within normal limits.    The lung fields and pleural surfaces are unremarkable except for minimal   linear scar like area left lower lung field. Left PICC line seen on   February 23, 2016 is no longer evident.    Deviation of the trachea to the right at the thoracic inlet suggesting   left lobe thyroid enlargement is unchanged.    IMPRESSION: Probable left thyroid enlargement again noted. Minimal left   lung scar. Prior PICC line removed.        Advanced directives addressed: full resuscitation

## 2018-05-10 RX ORDER — CEFEPIME 1 G/1
2000 INJECTION, POWDER, FOR SOLUTION INTRAMUSCULAR; INTRAVENOUS EVERY 24 HOURS
Qty: 0 | Refills: 0 | Status: DISCONTINUED | OUTPATIENT
Start: 2018-05-11 | End: 2018-05-11

## 2018-05-10 RX ORDER — CEFEPIME 1 G/1
2000 INJECTION, POWDER, FOR SOLUTION INTRAMUSCULAR; INTRAVENOUS ONCE
Qty: 0 | Refills: 0 | Status: COMPLETED | OUTPATIENT
Start: 2018-05-10 | End: 2018-05-10

## 2018-05-10 RX ORDER — CEFEPIME 1 G/1
INJECTION, POWDER, FOR SOLUTION INTRAMUSCULAR; INTRAVENOUS
Qty: 0 | Refills: 0 | Status: DISCONTINUED | OUTPATIENT
Start: 2018-05-10 | End: 2018-05-11

## 2018-05-10 RX ORDER — CEFEPIME 1 G/1
INJECTION, POWDER, FOR SOLUTION INTRAMUSCULAR; INTRAVENOUS
Qty: 0 | Refills: 0 | Status: DISCONTINUED | OUTPATIENT
Start: 2018-05-10 | End: 2018-05-10

## 2018-05-10 RX ORDER — VANCOMYCIN HCL 1 G
1250 VIAL (EA) INTRAVENOUS EVERY 12 HOURS
Qty: 0 | Refills: 0 | Status: DISCONTINUED | OUTPATIENT
Start: 2018-05-10 | End: 2018-05-11

## 2018-05-10 RX ADMIN — Medication 2: at 22:43

## 2018-05-10 RX ADMIN — HEPARIN SODIUM 5000 UNIT(S): 5000 INJECTION INTRAVENOUS; SUBCUTANEOUS at 14:43

## 2018-05-10 RX ADMIN — PIPERACILLIN AND TAZOBACTAM 25 GRAM(S): 4; .5 INJECTION, POWDER, LYOPHILIZED, FOR SOLUTION INTRAVENOUS at 07:17

## 2018-05-10 RX ADMIN — Medication 650 MILLIGRAM(S): at 22:44

## 2018-05-10 RX ADMIN — Medication 650 MILLIGRAM(S): at 11:44

## 2018-05-10 RX ADMIN — Medication 650 MILLIGRAM(S): at 05:50

## 2018-05-10 RX ADMIN — Medication 166.67 MILLIGRAM(S): at 18:26

## 2018-05-10 RX ADMIN — HEPARIN SODIUM 5000 UNIT(S): 5000 INJECTION INTRAVENOUS; SUBCUTANEOUS at 22:44

## 2018-05-10 RX ADMIN — INSULIN GLARGINE 22 UNIT(S): 100 INJECTION, SOLUTION SUBCUTANEOUS at 08:32

## 2018-05-10 RX ADMIN — Medication 6: at 08:33

## 2018-05-10 RX ADMIN — CEFEPIME 100 MILLIGRAM(S): 1 INJECTION, POWDER, FOR SOLUTION INTRAMUSCULAR; INTRAVENOUS at 14:41

## 2018-05-10 RX ADMIN — ATORVASTATIN CALCIUM 20 MILLIGRAM(S): 80 TABLET, FILM COATED ORAL at 22:44

## 2018-05-10 RX ADMIN — Medication 650 MILLIGRAM(S): at 18:24

## 2018-05-10 RX ADMIN — Medication 250 MILLIGRAM(S): at 05:51

## 2018-05-10 RX ADMIN — Medication 6: at 11:44

## 2018-05-10 RX ADMIN — LISINOPRIL 10 MILLIGRAM(S): 2.5 TABLET ORAL at 05:51

## 2018-05-10 RX ADMIN — HEPARIN SODIUM 5000 UNIT(S): 5000 INJECTION INTRAVENOUS; SUBCUTANEOUS at 05:50

## 2018-05-10 NOTE — PROGRESS NOTE ADULT - SUBJECTIVE AND OBJECTIVE BOX
Date of Service: -18     58 y/o female presents with c/o left great toe ulcer sent in by Dr. Bermudez for admission to treat osteomyelitis at the hallux sesamoid. Pt states she got outpatient MRI done recently which shows bone infection underneath the ulcer. Pt states she has been having the ulcer at the bottom of her left big toe since july 2017 and she has been seeing Dr. Bermudez regularly. Pt states Dr. Bermudez has been debriding it and performing local wound care with medihoney. Pt states she has been changing the dressing by herself daily. Pt states it has been getting smaller. Pt denies seeing any pus or drainage from the wound. Pt reports pain at the ulcer upon weight bearing. Pt denies any fever, chills, nausea, vomiting, SOB, chest pain.       Today 5/10/18: Pt is POD#2 s/p left foot incision and drainage with excision of the base of proximal phalanx of the left hallux and excision of the left foot tibial sesamoid (DOS 5/8/18). Pt is resting comfortably in bed and in NAD. Pt states the pain in her left foot is improving. Pt states she has been walking on the heel and it is a bit uncomfortable to the heel. Pt denies any other pedal pain or discomfort. Pt denies f/c/n/v/SOB/chest pain.     PMH: DM, HTN, HLD, TIA    PSH: ankle fusion (2009), left 2nd toe amputation     MEDICATIONS  (STANDING):  acetaminophen   Tablet 650 milliGRAM(s) Oral every 6 hours  atorvastatin 20 milliGRAM(s) Oral at bedtime  dextrose 5%. 1000 milliLiter(s) (50 mL/Hr) IV Continuous <Continuous>  dextrose 50% Injectable 12.5 Gram(s) IV Push once  dextrose 50% Injectable 25 Gram(s) IV Push once  dextrose 50% Injectable 25 Gram(s) IV Push once  heparin  Injectable 5000 Unit(s) SubCutaneous every 8 hours  insulin glargine Injectable (LANTUS) 22 Unit(s) SubCutaneous every morning  insulin lispro (HumaLOG) corrective regimen sliding scale   SubCutaneous three times a day before meals  insulin lispro (HumaLOG) corrective regimen sliding scale   SubCutaneous at bedtime  lisinopril 10 milliGRAM(s) Oral daily  piperacillin/tazobactam IVPB. 3.375 Gram(s) IV Intermittent every 8 hours  sodium chloride 0.9%. 1000 milliLiter(s) (50 mL/Hr) IV Continuous <Continuous>  vancomycin  IVPB 1000 milliGRAM(s) IV Intermittent every 12 hours    MEDICATIONS  (PRN):  acetaminophen   Tablet. 650 milliGRAM(s) Oral every 6 hours PRN Mild Pain (1 - 3) or fever, temp >100.4  dextrose 40% Gel 1 Gram(s) Oral once PRN Blood Glucose LESS THAN 70 milliGRAM(s)/deciliter  glucagon  Injectable 1 milliGRAM(s) IntraMuscular once PRN Glucose LESS THAN 70 milligrams/deciliter  HYDROmorphone  Injectable 0.5 milliGRAM(s) IV Push every 3 hours PRN Severe Pain (7 - 10)  oxyCODONE    IR 10 milliGRAM(s) Oral every 4 hours PRN Moderate Pain (4 - 6)      Allergies: Sulfadiazine    Vital Signs Last 24 Hrs  T(C): 37.1 (10 May 2018 05:13), Max: 37.9 (09 May 2018 16:51)  T(F): 98.8 (10 May 2018 05:13), Max: 100.2 (09 May 2018 16:51)  HR: 83 (10 May 2018 05:13) (83 - 91)  BP: 115/53 (10 May 2018 05:13) (115/53 - 131/81)  BP(mean): --  RR: 18 (10 May 2018 05:13) (18 - 18)  SpO2: 97% (10 May 2018 05:13) (97% - 99%)    PHYSICAL EXAM:  Constitutional: AAOx3, NAD, pleasant manner  Lower extremity:   Surgical dressing on the left foot is clean, dry and intact. No strikethrough noted. Upon removal of the dressing, the submet 1 ulcer noted with antibiotics bead intact. Dorsal and medial surgical sites suture are intact, well coapted, no dehiscence noted; no pus, no active drainage, no fluctuance, no crepitus; periwound: negative erythema, postdrive edema. The remainder of the foot and the right foot have to open lesion, no discoloration, no signs of acute injury or infection. No interdigital maceration b/l. Nails are dystrophic at right hallux and right 2nd toe and elongated x 5 in all right foot.    Vasc: pedal pulses DP and PT palpable b/l. CFT immediate in all digit x 10. Foot are warm to touch b/l.   Neuro: light touch and protective sensation intact b/l.   Ortho: pain upon palpation of the left submet 1 ulcer. No pain upon palpation and ROM of the remainder of the foot b/l. Pedal compartments are soft and supple. Negative Hollins's and Reza's signs b/l. Rigid left ankle and STJ. Pes planus, left foot.     LABS: All Labs Reviewed:               9.9    7.45  )-----------( 306      ( 10 May 2018 07:22 )             31.4     05-10    138  |  105  |  10  ----------------------------<  262<H>  4.0   |  27  |  0.73    Ca    9.0      10 May 2018 07:22      Culture - Blood (05.06.18 @ 18:12)    Specimen Source: .Blood Blood-Peripheral    Culture Results:   No growth to date.    Culture - Blood (05.06.18 @ 18:12)    Specimen Source: .Blood Blood-Peripheral    Culture Results:   No growth to date.    Surgical bone culture and pathology; deep wound culture collected on 5/8/18: pending results    RADIOLOGY/EKG:  < from: Xray Foot AP + Lateral + Oblique, Left (05.08.18 @ 20:25) >    EXAM:  FOOT-LEFT                            PROCEDURE DATE:  05/08/2018          INTERPRETATION:  Exam Date: 5/8/2018 8:25 PM    Radiographs of the left foot    CLINICAL INFORMATION:  s/p excision of infected bone hallux    TECHNIQUE:  Frontal, oblique and lateral views of the foot were obtained.    FINDINGS/  IMPRESSION:    Radiograph of October 2, 2016 is available for review.    Status post amputation of the left second digit at the level of the   proximal interphalangeal joint. Interval placement of a prior dated beats   at the left first MTP joint is visualized. No fractures or dislocations.   2 fixation screws through the hindfoot are unchanged in position since   prior exam. Soft tissues are intact.

## 2018-05-10 NOTE — PHYSICAL THERAPY INITIAL EVALUATION ADULT - MD ORDER
05/09/2018 15:58  50% PWBg through L heel.  Spoke with Dr. Hurtado re: confusing activity orders.  Patient cleared for out of bed with previous restrictions.

## 2018-05-10 NOTE — PROGRESS NOTE ADULT - ASSESSMENT
57F with non-healing foot ulcer, r/o OM    # Non-healing foot ulcer  # Acute Osteomyelitis  - admit to medicine  - cw abx per ID. plan for long term abx and PICC  - f/u OR cx  - MRI noted  - Podiatry appreicated  - prn pain meds    # DM2:  - A1C 10.7  - hold home oral medications  - Lantus increased to 22u - will titrate as needed  - ISS ac/hs  - diabetic diet    # HTN:  -c/w ACE-I    # HLD:  - c/w statin    # DVT Px:  -Heparin sq    Likely dc home tomorrow after PICC placement

## 2018-05-10 NOTE — PHYSICAL THERAPY INITIAL EVALUATION ADULT - DIAGNOSIS, PT EVAL
s/p left foot incision and drainage with excision of the base of proximal phalanx of the left hallux and excision of the left foot tibial sesamoid

## 2018-05-10 NOTE — PHYSICAL THERAPY INITIAL EVALUATION ADULT - MODALITIES TREATMENT COMMENTS
Patient left OOB in chair with CBIR. Patient instructed to call for assistance back to bed or the bathroom, understands same. Patient independent in functional mobility with AC, no skilled need in this setting.  May ambulate per nursing.  Will d/c from PT. RN informed.

## 2018-05-10 NOTE — PHYSICAL THERAPY INITIAL EVALUATION ADULT - DID THE PATIENT HAVE SURGERY?
yes/s/p left foot incision and drainage with excision of the base of proximal phalanx of the left hallux and excision of the left foot tibial sesamoid

## 2018-05-10 NOTE — PROGRESS NOTE ADULT - ATTENDING COMMENTS
Agree with Podiatry resident assessment and plan, patient to follow up in office on Thursday upon discharge.

## 2018-05-10 NOTE — PROGRESS NOTE ADULT - ASSESSMENT
58 y/o F with PMHx of HTN, HLD, DM, TIA, PSHx of ankle fusion (2009), left 2nd toe amputation presents with c/o non-healing foot ulcer, sent in by Dr. Bermudez to r/o OM. Pt states she got outpatient MRI done recently which shows bone infection underneath the ulcer. Ulcer present since July 2017 and she has been seeing Dr. Bermudez regularly for treatment. Pt reports pain at the ulcer upon weight bearing. Pt denies any fever, chills, nausea, vomiting, SOB, chest pain. Here afebrile, normal wbc ct, was given IV vancomycin/zosyn.     1. L foot ulcer w/ acute OM  - MRI reviewed, podiatry eval appreciated  - s/p L foot I and D/bone exision/abx bead placement along proximal phalanx  - on vancomycin trough low, increase to [id number] #4  - completed 3 days of zosyn, switch to cefepime 2gm daily  - continue with antibiotic coverage  -OR cx no growth so far  - blood cx no growth  - plan for eventual PICC line and 6 weeks of IV abx - vancomycin [id number] + cefepime 2gm daily   - tolerating abx well so far; no side effects noted.  -reason for abx use and side effects reviewed with patient  - supportive care  - f/u cbc    2. other issues - care per medicine

## 2018-05-10 NOTE — PROGRESS NOTE ADULT - ASSESSMENT
58 y/o female s/p left foot incision and drainage with excision of the base of proximal phalanx of the left hallux and excision of the left foot tibial sesamoid (DOS 5/8/18) is seen and evaluated for:    1. Acute osteomyelitis of the first proximal phalanx, left foot   2. Acute osteomyelitis of the tibial and fibular sesamoids, left foot   3. Full thickness ulceration Ogden Grade 1; submetatarsal head 1, left foot   4. Onychomycosis x 2, right foot   5. DM type II    6. Pes planus, left foot  7. s/p left ankle fusion    Pt is seen and evaluated; plan d/w attending Dr. Bermudez.  Postop left foot xrays reviewed  Cleansed the wound with normal saline   Applied adaptic to the submet 1 wound and suture sites follow by gauze, abd and Kerlix to the left foot   Encouraged pt to elevate left foot.  Pain medications PRN.  Nursing order placed to reinforce dressing as needed.  Pt instructed to stay partial heel weight bearing with CAM Boot to the left foot   Cam Boot ordered from [facility]    Recommend long term IVabx with PICC per ID recommendations.  Care per Medicine, appreciated.  Podiatry will follow while in house.

## 2018-05-10 NOTE — PROGRESS NOTE ADULT - SUBJECTIVE AND OBJECTIVE BOX
CHIEF COMPLAINT: nonhealing ulcer    SUBJECTIVE: s/p OR POD 2.  BG still high despite increased dose lantus.    REVIEW OF SYSTEMS: All other review of systems is negative unless indicated above    Vital Signs Last 24 Hrs  T(C): 36.9 (10 May 2018 16:10), Max: 37.1 (10 May 2018 05:13)  T(F): 98.4 (10 May 2018 16:10), Max: 98.8 (10 May 2018 05:13)  HR: 88 (10 May 2018 16:10) (83 - 88)  BP: 133/83 (10 May 2018 16:10) (104/57 - 133/83)  RR: 18 (10 May 2018 16:10) (17 - 18)  SpO2: 98% (10 May 2018 16:10) (97% - 98%)    PHYSICAL EXAM:              Constitutional: NAD, awake and alert, well-developed  	HEENT: PERR, EOMI, Normal Hearing, MMM  	Neck: Soft and supple, No LAD, No JVD  	Respiratory: Breath sounds are clear bilaterally, No wheezing, rales or rhonchi  	Cardiovascular: S1 and S2, regular rate and rhythm, no Murmurs, gallops or rubs  	Gastrointestinal: Bowel Sounds present, soft, nontender, nondistended, no guarding, no rebound  	Extremities: No peripheral edema  	Vascular: 2+ peripheral pulses  	Neurological: A/O x 3, no focal deficits  	Musculoskeletal: 5/5 strength b/l upper and lower extremities              Skin: non-healing ulcer to base of 1st digit, sole of foot    MEDICATIONS:  MEDICATIONS  (STANDING):  acetaminophen   Tablet 650 milliGRAM(s) Oral every 6 hours  atorvastatin 20 milliGRAM(s) Oral at bedtime  cefepime   IVPB      dextrose 5%. 1000 milliLiter(s) (50 mL/Hr) IV Continuous <Continuous>  dextrose 50% Injectable 12.5 Gram(s) IV Push once  dextrose 50% Injectable 25 Gram(s) IV Push once  dextrose 50% Injectable 25 Gram(s) IV Push once  heparin  Injectable 5000 Unit(s) SubCutaneous every 8 hours  insulin glargine Injectable (LANTUS) 22 Unit(s) SubCutaneous every morning  insulin lispro (HumaLOG) corrective regimen sliding scale   SubCutaneous three times a day before meals  insulin lispro (HumaLOG) corrective regimen sliding scale   SubCutaneous at bedtime  lisinopril 10 milliGRAM(s) Oral daily  sodium chloride 0.9%. 1000 milliLiter(s) (50 mL/Hr) IV Continuous <Continuous>  vancomycin  IVPB 1250 milliGRAM(s) IV Intermittent every 12 hours    LABS: All Labs Reviewed:                        9.9    7.45  )-----------( 306      ( 10 May 2018 07:22 )             31.4     138  |  105  |  10  ----------------------------<  262<H>  4.0   |  27  |  0.73    Ca    9.0      10 May 2018 07:22    Blood Culture: 05-08 @ 17:52  Organism --  Gram Stain Blood -- Gram Stain   No polymorphonuclear leukocytes seen per low power field  No organisms seen  Specimen Source .Surgical Swab lft first mpj  Culture-Blood --    05-06 @ 18:12  Organism --  Gram Stain Blood -- Gram Stain --  Specimen Source .Blood Blood-Peripheral  Culture-Blood --

## 2018-05-10 NOTE — PHYSICAL THERAPY INITIAL EVALUATION ADULT - PERTINENT HX OF CURRENT PROBLEM, REHAB EVAL
56 yo F admitted with PSHx of ankle fusion (2009), left 2nd toe amputation presents with c/o non-healing foot ulcer, sent in by Dr. Bermudez to r/o OM.

## 2018-05-10 NOTE — PROGRESS NOTE ADULT - SUBJECTIVE AND OBJECTIVE BOX
Date of service: 05-10-18 @ 11:40    pt seen and examined  feels better  no fevers  s/p left foot I & D/bone excision/abx bead placement 5/8      ROS: no fever or chills; denies dizziness, no HA, no SOB or cough, no abdominal pain, no diarrhea or constipation; no dysuria, no urinary frequency      MEDICATIONS  (STANDING):  acetaminophen   Tablet 650 milliGRAM(s) Oral every 6 hours  atorvastatin 20 milliGRAM(s) Oral at bedtime  dextrose 5%. 1000 milliLiter(s) (50 mL/Hr) IV Continuous <Continuous>  dextrose 50% Injectable 12.5 Gram(s) IV Push once  dextrose 50% Injectable 25 Gram(s) IV Push once  dextrose 50% Injectable 25 Gram(s) IV Push once  heparin  Injectable 5000 Unit(s) SubCutaneous every 8 hours  insulin glargine Injectable (LANTUS) 22 Unit(s) SubCutaneous every morning  insulin lispro (HumaLOG) corrective regimen sliding scale   SubCutaneous three times a day before meals  insulin lispro (HumaLOG) corrective regimen sliding scale   SubCutaneous at bedtime  lisinopril 10 milliGRAM(s) Oral daily  piperacillin/tazobactam IVPB. 3.375 Gram(s) IV Intermittent every 8 hours  sodium chloride 0.9%. 1000 milliLiter(s) (50 mL/Hr) IV Continuous <Continuous>  vancomycin  IVPB 1000 milliGRAM(s) IV Intermittent every 12 hours      Vital Signs Last 24 Hrs  T(C): 36.8 (10 May 2018 11:29), Max: 37.9 (09 May 2018 16:51)  T(F): 98.2 (10 May 2018 11:29), Max: 100.2 (09 May 2018 16:51)  HR: 87 (10 May 2018 11:29) (83 - 90)  BP: 104/57 (10 May 2018 11:29) (104/57 - 131/81)  BP(mean): --  RR: 17 (10 May 2018 11:29) (17 - 18)  SpO2: 98% (10 May 2018 11:29) (97% - 98%)            PE:    Constitutional: frail looking  HEENT: NC/AT, EOMI, PERRLA, conjunctivae clear; ears and nose atraumatic; pharynx benign  Neck: supple; thyroid not palpable  Back: no tenderness  Respiratory: respiratory effort normal; clear to auscultation  Cardiovascular: S1S2 regular, no murmurs  Abdomen: soft, not tender, not distended, positive BS; liver and spleen WNL  Genitourinary: no suprapubic tenderness  Lymphatic: no LN palpable  Musculoskeletal: no muscle tenderness, no joint swelling or tenderness  Extremities: no pedal edema  Neurological/ Psychiatric: AxOx3, Judgement and insight normal; moving all extremities  Skin: L plantar aspect of 1st toe with 3 cm ulcer with surrounding warmth    Labs: all available labs reviewed                                   9.9    7.45  )-----------( 306      ( 10 May 2018 07:22 )             31.4     05-10    138  |  105  |  10  ----------------------------<  262<H>  4.0   |  27  |  0.73    Ca    9.0      10 May 2018 07:22         Vancomycin Level, Trough: 8.6 ug/mL (05-08 @ 16:45)      Culture - Tissue with Gram Stain (05.08.18 @ 17:52)    Gram Stain:   No polymorphonuclear leukocytes seen per low power field  No organisms seen    Specimen Source: .Tissue lft first mpj bone cx    Culture - Blood (05.06.18 @ 18:12)    Specimen Source: .Blood Blood-Peripheral    Culture Results:   No growth to date.      Cultures:   Culture - Blood (05.06.18 @ 18:12)    Specimen Source: .Blood Blood-Peripheral    Culture Results:   No growth to date.              Radiology: all available radiological tests reviewed  < from: MR Foot No Cont, Left (05.07.18 @ 14:34) >    EXAM:  MR FOOT LT                            PROCEDURE DATE:  05/07/2018          INTERPRETATION:  History: Chronic first set metatarsal ulcer. Concern for   osteomyelitis.    Multiplanar multisequence MRI of the left foot was performed from the   level of the toes to the level of the and navicular cuneiform   articulation.    Correlation is made with prior radiograph of the left foot from October 2, 2016.    Findings:    There is a broad plantar subcutaneous wound at the level of the first   metatarsal phalangeal joint measuring approximately 0.9 cm and an   anteroposterior dimension of approximately 1.2 cm in transverse   dimension. There is surrounding skin thickening and hypointense signal   consistent with granulation tissue. There is mild edema within the   subcutaneous fat consistent with cellulitis.    There is chronic posttraumatic deformity of the first proximal phalanx   with the plantar aspect of the base of the first proximal phalanx   extending along the undersurface of the first metatarsal head. There is   osseous edema and hypointense T1 signal along the plantar base of the   first proximal phalanx extending to the proximal shaft consistent with   acute osteomyelitis. Mild osseous edema and vague hypointense T1 signal   is also noted within both the tibial and fibular sesamoids also   concerning for early osteomyelitis.    Patient is status post amputation of the middle and distal phalanges of   the second toe marrow signal within the remainder of the foot is   otherwise preserved. There is mild hallux valgus with mild first   metatarsophalangeal and hallux sesamoid joint arthrosis. Dorsal spurring   is noted at the first through third tarsometatarsal and navicular   cuneiform articulations.    Visualized tendinous structures are intact without tenosynovitis or   tearing. There is extensive fatty atrophy of the plantar muscles   consistent with denervation related change. The Lisfranc ligament is   intact.    Impression:    Plantar wound at the level ofthe first metatarsal phalangeal joint.   Acute osteomyelitis of the plantar base of the first proximal phalanx   extending to the proximal shaft in the setting of a chronic posttraumatic   deformity. Marrow signal alteration within the tibial and fibular   sesamoids also concerning for acute osteomyelitis.    < end of copied text >      < from: Xray Chest 1 View AP/PA. (05.06.18 @ 19:42) >    EXAM:  XR CHEST AP OR PA 1V                            PROCEDURE DATE:  05/06/2018          INTERPRETATION:  AP chest on May 6, 2018. Patient is preop and has   osteomyelitis.    Heart size is within normal limits.    The lung fields and pleural surfaces are unremarkable except for minimal   linear scar like area left lower lung field. Left PICC line seen on   February 23, 2016 is no longer evident.    Deviation of the trachea to the right at the thoracic inlet suggesting   left lobe thyroid enlargement is unchanged.    IMPRESSION: Probable left thyroid enlargement again noted. Minimal left   lung scar. Prior PICC line removed.        Advanced directives addressed: full resuscitation

## 2018-05-11 ENCOUNTER — TRANSCRIPTION ENCOUNTER (OUTPATIENT)
Age: 58
End: 2018-05-11

## 2018-05-11 VITALS
SYSTOLIC BLOOD PRESSURE: 124 MMHG | OXYGEN SATURATION: 100 % | HEART RATE: 87 BPM | RESPIRATION RATE: 18 BRPM | DIASTOLIC BLOOD PRESSURE: 78 MMHG | TEMPERATURE: 98 F

## 2018-05-11 LAB — SURGICAL PATHOLOGY FINAL REPORT - CH: SIGNIFICANT CHANGE UP

## 2018-05-11 PROCEDURE — 71045 X-RAY EXAM CHEST 1 VIEW: CPT | Mod: 26

## 2018-05-11 PROCEDURE — 71045 X-RAY EXAM CHEST 1 VIEW: CPT | Mod: 26,77

## 2018-05-11 RX ORDER — FLUTICASONE PROPIONATE 50 MCG
1 SPRAY, SUSPENSION NASAL
Qty: 1 | Refills: 0
Start: 2018-05-11

## 2018-05-11 RX ORDER — CETIRIZINE HYDROCHLORIDE 10 MG/1
1 TABLET ORAL
Qty: 30 | Refills: 0
Start: 2018-05-11 | End: 2018-06-09

## 2018-05-11 RX ADMIN — Medication 650 MILLIGRAM(S): at 13:27

## 2018-05-11 RX ADMIN — LISINOPRIL 10 MILLIGRAM(S): 2.5 TABLET ORAL at 05:40

## 2018-05-11 RX ADMIN — Medication 650 MILLIGRAM(S): at 05:41

## 2018-05-11 RX ADMIN — Medication 166.67 MILLIGRAM(S): at 05:43

## 2018-05-11 RX ADMIN — Medication 2: at 13:11

## 2018-05-11 RX ADMIN — CEFEPIME 100 MILLIGRAM(S): 1 INJECTION, POWDER, FOR SOLUTION INTRAMUSCULAR; INTRAVENOUS at 13:10

## 2018-05-11 RX ADMIN — Medication 166.67 MILLIGRAM(S): at 16:05

## 2018-05-11 RX ADMIN — Medication 6: at 08:12

## 2018-05-11 NOTE — PROGRESS NOTE ADULT - ASSESSMENT
56 y/o female s/p left foot incision and drainage with excision of the base of proximal phalanx of the left hallux and excision of the left foot tibial sesamoid (DOS 5/8/18) is seen and evaluated for:    1. Acute osteomyelitis of the first proximal phalanx, left foot   2. Acute osteomyelitis of the tibial and fibular sesamoids, left foot   3. Full thickness ulceration Ogden Grade 1; submetatarsal head 1, left foot   4. Onychomycosis x 2, right foot   5. DM type II    6. Pes planus, left foot  7. s/p left ankle fusion    Pt is seen and evaluated; plan d/w attending Dr. Bermudez.  Imaging, labs, vitals, and cultures reviewed and discussed with patient   Cleansed the wound with normal saline   Painted the plantar medial suture site with betadine and apply adaptic to the submet 1 wound and suture sites follow by gauze, abd and Kerlix to the left foot   Encouraged pt to elevate left foot.  Pain medications PRN.  Pt instructed to stay partial heel weight bearing with CAM Boot to the left foot   Recommend long term IV abx with PICC per ID recommendations.  Care per Medicine, appreciated.  Patient to follow up with Dr. Bermudez at the office within a week upon discharge   Podiatry will follow while in house.      Daily Wound Care Instruction:  Cleanse the left foot surgical sites with normal saline (keep the antibiotics beads intact). Apply adaptic to submetatarsal head 1 ulcer and suture sites of the left foot follow by 4x4 gauze, abd and Kerlix to the left foot.

## 2018-05-11 NOTE — DISCHARGE NOTE ADULT - CARE PROVIDER_API CALL
Baldo Hopper), Family Medicine  [address] [place] [address]  Phone: [phone]  Fax: [phone]    Hermes Bermudez), Orthopaedics Surgery  [address]  [place] [address]  Phone: [phone]  Fax: [phone]

## 2018-05-11 NOTE — DISCHARGE NOTE ADULT - NS TRANSFER PATIENT BELONGINGS
Cell Phone/PDA (specify)/Other belongings/cash from security returned/None/Electronic Device (specify)/Jewelry/Money (specify)

## 2018-05-11 NOTE — ADVANCED PRACTICE NURSE CONSULT - ASSESSMENT
Rec.d order to place PICC line for long-term IV abx. Reviewed chart, labwork, explained all risks and benefits and obtained consent for PICC placement. Pt. A&Ox3 and verified pt.’s identification, name, , and correct procedure.  Inserted Navilyst PICC 4FS w/PASV technology via ultrasound guidance to ­right brachial, using MST, number of insertion attempts: 1, cut to 45 cm. length, brisk blood return, flushes well w/60 ml. NS as had to flush line into position. Site prepped with CHG, Draped in sterile fashion using strict aseptic technique maintained throughout procedure, performed hand hygiene, all team members maintained full barrier precautions, 1% lidocaine used subdermally, Minimal blood loss. Site covered with sterile dressing and dated. No complications. Endcap placed.  Pt. tolerated well.  All sharps and guidewires accounted for. CXR confirms placement, no pneumothorax. Report given to district nurse. Lot #[id number].

## 2018-05-11 NOTE — DISCHARGE NOTE ADULT - CARE PLAN
Was visiting  upstairs and became dizzy. Floor nurse took BP and was low. Family insists pt be seen . Principal Discharge DX:	Osteomyelitis of foot  Goal:	resolution  Assessment and plan of treatment:	follow up with podiatry  complete course of antibiotics  Secondary Diagnosis:	DM (diabetes mellitus)  Assessment and plan of treatment:	resume your usual diabetes medications

## 2018-05-11 NOTE — PROGRESS NOTE ADULT - SUBJECTIVE AND OBJECTIVE BOX
Date of Service: 05-11-18     56 y/o female presents with c/o left great toe ulcer sent in by Dr. Bermudez for admission to treat osteomyelitis at the hallux sesamoid. Pt states she got outpatient MRI done recently which shows bone infection underneath the ulcer. Pt states she has been having the ulcer at the bottom of her left big toe since july 2017 and she has been seeing Dr. Bermudez regularly. Pt states Dr. Bermudez has been debriding it and performing local wound care with medihoney. Pt states she has been changing the dressing by herself daily. Pt states it has been getting smaller. Pt denies seeing any pus or drainage from the wound. Pt reports pain at the ulcer upon weight bearing. Pt denies any fever, chills, nausea, vomiting, SOB, chest pain.       Today 5/11/18: Pt is POD#3 s/p left foot incision and drainage with excision of the base of proximal phalanx of the left hallux and excision of the left foot tibial sesamoid (DOS 5/8/18). Pt is resting comfortably in bed and in NAD. Pt denies any pain or discomfort in b/l LE including the left foot surgical site. Pt states she has been walking on the heel and the new CAM Boot for the left foot is comfortable. Pt denies f/c/n/v/SOB/chest pain.     PMH: DM, HTN, HLD, TIA    PSH: ankle fusion (2009), left 2nd toe amputation     MEDICATIONS  (STANDING):  acetaminophen   Tablet 650 milliGRAM(s) Oral every 6 hours  atorvastatin 20 milliGRAM(s) Oral at bedtime  cefepime   IVPB 2000 milliGRAM(s) IV Intermittent every 24 hours  cefepime   IVPB      dextrose 5%. 1000 milliLiter(s) (50 mL/Hr) IV Continuous <Continuous>  dextrose 50% Injectable 12.5 Gram(s) IV Push once  dextrose 50% Injectable 25 Gram(s) IV Push once  dextrose 50% Injectable 25 Gram(s) IV Push once  heparin  Injectable 5000 Unit(s) SubCutaneous every 8 hours  insulin glargine Injectable (LANTUS) 22 Unit(s) SubCutaneous every morning  insulin lispro (HumaLOG) corrective regimen sliding scale   SubCutaneous three times a day before meals  insulin lispro (HumaLOG) corrective regimen sliding scale   SubCutaneous at bedtime  lisinopril 10 milliGRAM(s) Oral daily  sodium chloride 0.9%. 1000 milliLiter(s) (50 mL/Hr) IV Continuous <Continuous>  vancomycin  IVPB 1250 milliGRAM(s) IV Intermittent every 12 hours    MEDICATIONS  (PRN):  acetaminophen   Tablet. 650 milliGRAM(s) Oral every 6 hours PRN Mild Pain (1 - 3) or fever, temp >100.4  dextrose 40% Gel 1 Gram(s) Oral once PRN Blood Glucose LESS THAN 70 milliGRAM(s)/deciliter  glucagon  Injectable 1 milliGRAM(s) IntraMuscular once PRN Glucose LESS THAN 70 milligrams/deciliter  HYDROmorphone  Injectable 0.5 milliGRAM(s) IV Push every 3 hours PRN Severe Pain (7 - 10)  oxyCODONE    IR 10 milliGRAM(s) Oral every 4 hours PRN Moderate Pain (4 - 6)      Allergies: Sulfadiazine    Vital Signs Last 24 Hrs  T(C): 37 (11 May 2018 04:46), Max: 37 (11 May 2018 04:46)  T(F): 98.6 (11 May 2018 04:46), Max: 98.6 (11 May 2018 04:46)  HR: 81 (11 May 2018 04:46) (81 - 88)  BP: 104/62 (11 May 2018 04:46) (104/57 - 133/83)  BP(mean): --  RR: 18 (11 May 2018 04:46) (17 - 18)  SpO2: 97% (11 May 2018 04:46) (97% - 98%)    PHYSICAL EXAM:  Constitutional: AAOx3, NAD, pleasant manner  Lower extremity:   Surgical dressing on the left foot is clean, dry and intact. Pinkish drainage noted at the inner layer of the dressing.  Upon removal of the dressing, the submet 1 ulcer noted with antibiotics bead intact. Dorsal and medial surgical sites suture are intact, well coapted, no dehiscence noted; no pus, no active drainage, no fluctuance, no crepitus; periwound: negative erythema, postive edema and mild maceration at the medial plantar surgical site. The remainder of the foot and the right foot have to open lesion, no discoloration, no signs of acute injury or infection. No interdigital maceration b/l.  Vasc: pedal pulses DP and PT palpable 1/4 b/l. CFT immediate in all digit x 10. Foot are warm to touch b/l.   Neuro: light touch and protective sensation intact b/l.   Ortho: no pain upon palpation of the surgical sites, left foot. No pain upon palpation and ROM of the remainder of the foot b/l. Pedal compartments are soft and supple. Negative Hollins's and Reza's signs b/l. Rigid left ankle and STJ. Pes planus, left foot.     LABS: All Labs Reviewed:               9.9    7.45  )-----------( 306      ( 10 May 2018 07:22 )             31.4     05-10    138  |  105  |  10  ----------------------------<  262<H>  4.0   |  27  |  0.73    Ca    9.0      10 May 2018 07:22          Culture - Blood (05.06.18 @ 18:12)    Specimen Source: .Blood Blood-Peripheral    Culture Results:   No growth to date.    Culture - Blood (05.06.18 @ 18:12)    Specimen Source: .Blood Blood-Peripheral    Culture Results:   No growth to date.    Culture - Tissue with Gram Stain (05.08.18 @ 17:52)    Gram Stain:   No polymorphonuclear leukocytes seen per low power field  No organisms seen    Specimen Source: .Tissue lft first mpj bone cx    Culture Results:   No growth    Culture - Surgical Swab (05.08.18 @ 17:52)    Specimen Source: .Surgical Swab lft first mpj    Culture Results:   Growth in fluid media only Staphylococcus aureus Susceptibility to follow.  Rare Corynebacterium species Susceptibilites not performed.      RADIOLOGY/EKG:  < from: Xray Foot AP + Lateral + Oblique, Left (05.08.18 @ 20:25) >    EXAM:  FOOT-LEFT                            PROCEDURE DATE:  05/08/2018          INTERPRETATION:  Exam Date: 5/8/2018 8:25 PM    Radiographs of the left foot    CLINICAL INFORMATION:  s/p excision of infected bone hallux    TECHNIQUE:  Frontal, oblique and lateral views of the foot were obtained.    FINDINGS/  IMPRESSION:    Radiograph of October 2, 2016 is available for review.    Status post amputation of the left second digit at the level of the   proximal interphalangeal joint. Interval placement of a prior dated beats   at the left first MTP joint is visualized. No fractures or dislocations.   2 fixation screws through the hindfoot are unchanged in position since   prior exam. Soft tissues are intact.

## 2018-05-11 NOTE — DISCHARGE NOTE ADULT - CONDITIONS AT DISCHARGE
gauze; tape; kerlix; ace wrap provided for foot.   seek medical attention right away if there are any signs of infection; temperature; pus or discharge from wound.  Keep PICC clean, and dry.   wrap with saran wrap if showering and pat dry.   no submerging in water

## 2018-05-11 NOTE — DISCHARGE NOTE ADULT - PLAN OF CARE
resolution follow up with podiatry  complete course of antibiotics resume your usual diabetes medications

## 2018-05-11 NOTE — PROGRESS NOTE ADULT - SUBJECTIVE AND OBJECTIVE BOX
Date of service: 05-11-18 @ 10:20    pt seen and examined  feels better  no fevers  s/p left foot I & D/bone excision/abx bead placement 5/8      ROS: no fever or chills; denies dizziness, no HA, no SOB or cough, no abdominal pain, no diarrhea or constipation; no dysuria, no urinary frequency    MEDICATIONS  (STANDING):  acetaminophen   Tablet 650 milliGRAM(s) Oral every 6 hours  atorvastatin 20 milliGRAM(s) Oral at bedtime  cefepime   IVPB 2000 milliGRAM(s) IV Intermittent every 24 hours  cefepime   IVPB      dextrose 5%. 1000 milliLiter(s) (50 mL/Hr) IV Continuous <Continuous>  dextrose 50% Injectable 12.5 Gram(s) IV Push once  dextrose 50% Injectable 25 Gram(s) IV Push once  dextrose 50% Injectable 25 Gram(s) IV Push once  heparin  Injectable 5000 Unit(s) SubCutaneous every 8 hours  insulin glargine Injectable (LANTUS) 22 Unit(s) SubCutaneous every morning  insulin lispro (HumaLOG) corrective regimen sliding scale   SubCutaneous three times a day before meals  insulin lispro (HumaLOG) corrective regimen sliding scale   SubCutaneous at bedtime  lisinopril 10 milliGRAM(s) Oral daily  sodium chloride 0.9%. 1000 milliLiter(s) (50 mL/Hr) IV Continuous <Continuous>  vancomycin  IVPB 1250 milliGRAM(s) IV Intermittent every 12 hours      Vital Signs Last 24 Hrs  T(C): 37 (11 May 2018 04:46), Max: 37 (11 May 2018 04:46)  T(F): 98.6 (11 May 2018 04:46), Max: 98.6 (11 May 2018 04:46)  HR: 81 (11 May 2018 04:46) (81 - 88)  BP: 104/62 (11 May 2018 04:46) (104/57 - 133/83)  BP(mean): --  RR: 18 (11 May 2018 04:46) (17 - 18)  SpO2: 97% (11 May 2018 04:46) (97% - 98%)          PE:  Constitutional: frail looking  HEENT: NC/AT, EOMI, PERRLA, conjunctivae clear; ears and nose atraumatic; pharynx benign  Neck: supple; thyroid not palpable  Back: no tenderness  Respiratory: respiratory effort normal; clear to auscultation  Cardiovascular: S1S2 regular, no murmurs  Abdomen: soft, not tender, not distended, positive BS; liver and spleen WNL  Genitourinary: no suprapubic tenderness  Lymphatic: no LN palpable  Musculoskeletal: no muscle tenderness, no joint swelling or tenderness  Extremities: no pedal edema  Neurological/ Psychiatric: AxOx3, Judgement and insight normal; moving all extremities  Skin: L plantar aspect of 1st toe with 3 cm ulcer with surrounding warmth    Labs: all available labs reviewed                                              9.9    7.45  )-----------( 306      ( 10 May 2018 07:22 )             31.4     05-10    138  |  105  |  10  ----------------------------<  262<H>  4.0   |  27  |  0.73    Ca    9.0      10 May 2018 07:22               Vancomycin Level, Trough: 8.6 ug/mL (05-08 @ 16:45)      Culture - Tissue with Gram Stain (05.08.18 @ 17:52)    Gram Stain:   No polymorphonuclear leukocytes seen per low power field  No organisms seen    Specimen Source: .Tissue lft first mpj bone cx    Culture Results:   No growth    Culture - Surgical Swab (05.08.18 @ 17:52)    Specimen Source: .Surgical Swab lft first mpj    Culture Results:   Growth in fluid media only Staphylococcus aureus Susceptibility to follow.  Rare Corynebacterium species Susceptibilites not performed.        Culture - Blood (05.06.18 @ 18:12)    Specimen Source: .Blood Blood-Peripheral    Culture Results:   No growth to date.      Cultures:   Culture - Blood (05.06.18 @ 18:12)    Specimen Source: .Blood Blood-Peripheral    Culture Results:   No growth to date.              Radiology: all available radiological tests reviewed  < from: MR Foot No Cont, Left (05.07.18 @ 14:34) >    EXAM:  MR FOOT LT                            PROCEDURE DATE:  05/07/2018          INTERPRETATION:  History: Chronic first set metatarsal ulcer. Concern for   osteomyelitis.    Multiplanar multisequence MRI of the left foot was performed from the   level of the toes to the level of the and navicular cuneiform   articulation.    Correlation is made with prior radiograph of the left foot from October 2, 2016.    Findings:    There is a broad plantar subcutaneous wound at the level of the first   metatarsal phalangeal joint measuring approximately 0.9 cm and an   anteroposterior dimension of approximately 1.2 cm in transverse   dimension. There is surrounding skin thickening and hypointense signal   consistent with granulation tissue. There is mild edema within the   subcutaneous fat consistent with cellulitis.    There is chronic posttraumatic deformity of the first proximal phalanx   with the plantar aspect of the base of the first proximal phalanx   extending along the undersurface of the first metatarsal head. There is   osseous edema and hypointense T1 signal along the plantar base of the   first proximal phalanx extending to the proximal shaft consistent with   acute osteomyelitis. Mild osseous edema and vague hypointense T1 signal   is also noted within both the tibial and fibular sesamoids also   concerning for early osteomyelitis.    Patient is status post amputation of the middle and distal phalanges of   the second toe marrow signal within the remainder of the foot is   otherwise preserved. There is mild hallux valgus with mild first   metatarsophalangeal and hallux sesamoid joint arthrosis. Dorsal spurring   is noted at the first through third tarsometatarsal and navicular   cuneiform articulations.    Visualized tendinous structures are intact without tenosynovitis or   tearing. There is extensive fatty atrophy of the plantar muscles   consistent with denervation related change. The Lisfranc ligament is   intact.    Impression:    Plantar wound at the level ofthe first metatarsal phalangeal joint.   Acute osteomyelitis of the plantar base of the first proximal phalanx   extending to the proximal shaft in the setting of a chronic posttraumatic   deformity. Marrow signal alteration within the tibial and fibular   sesamoids also concerning for acute osteomyelitis.    < end of copied text >      < from: Xray Chest 1 View AP/PA. (05.06.18 @ 19:42) >    EXAM:  XR CHEST AP OR PA 1V                            PROCEDURE DATE:  05/06/2018          INTERPRETATION:  AP chest on May 6, 2018. Patient is preop and has   osteomyelitis.    Heart size is within normal limits.    The lung fields and pleural surfaces are unremarkable except for minimal   linear scar like area left lower lung field. Left PICC line seen on   February 23, 2016 is no longer evident.    Deviation of the trachea to the right at the thoracic inlet suggesting   left lobe thyroid enlargement is unchanged.    IMPRESSION: Probable left thyroid enlargement again noted. Minimal left   lung scar. Prior PICC line removed.        Advanced directives addressed: full resuscitation

## 2018-05-11 NOTE — PROGRESS NOTE ADULT - ASSESSMENT
56 y/o F with PMHx of HTN, HLD, DM, TIA, PSHx of ankle fusion (2009), left 2nd toe amputation presents with c/o non-healing foot ulcer, sent in by Dr. Bermudez to r/o OM. Pt states she got outpatient MRI done recently which shows bone infection underneath the ulcer. Ulcer present since July 2017 and she has been seeing Dr. Bermudez regularly for treatment. Pt reports pain at the ulcer upon weight bearing. Pt denies any fever, chills, nausea, vomiting, SOB, chest pain. Here afebrile, normal wbc ct, was given IV vancomycin/zosyn.     1. L foot ulcer w/ acute OM  - MRI reviewed, podiatry eval appreciated  - s/p L foot I and D/bone exision/abx bead placement along proximal phalanx  - on vancomycin trough low, increase to [id number] #5  - completed 3 days of zosyn, switch to cefepime 2gm daily  - continue with antibiotic coverage  - OR cx with staph aureus, f/u final cx results  - blood cx no growth  - plan for eventual PICC line and 6 weeks of IV abx - vancomycin [id number] + cefepime 2gm daily 6/18/2018 with weekly cbc, cmp, esr, crp, vanco troughs  - tolerating abx well so far; no side effects noted.  - reason for abx use and side effects reviewed with patient  - supportive care  - f/u cbc    2. other issues - care per medicine

## 2018-05-11 NOTE — DISCHARGE NOTE ADULT - HOME CARE AGENCY
[facility] for IV antibiotic and IV supplies [phone], [facility] [phone]

## 2018-05-11 NOTE — DISCHARGE NOTE ADULT - MEDICATION SUMMARY - MEDICATIONS TO TAKE
I will START or STAY ON the medications listed below when I get home from the hospital:    Lotensin 10 mg oral tablet  -- 1 tab(s) by mouth once a day  -- Indication: For .    Tresiba FlexTouch  -- 20  subcutaneous once a day  -- Indication: For .    Glucophage 500 mg oral tablet  -- 1 tab(s) by mouth 2 times a day  -- Indication: For .    Micronase  -- 10 milligram(s) by mouth once a day  -- Indication: For .    ZyrTEC 10 mg oral tablet  -- 1 tab(s) by mouth once a day   -- May cause drowsiness.  Alcohol may intensify this effect.  Use care when operating dangerous machinery.  Obtain medical advice before taking any non-prescription drugs as some may affect the action of this medication.    -- Indication: For .    Lipitor 20 mg oral tablet  -- 1 tab(s) by mouth once a day  -- Indication: For .    Flonase 50 mcg/inh nasal spray  -- 1 spray(s) in each nostril 2 times a day   -- For the nose.  It is very important that you take or use this exactly as directed.  Do not skip doses or discontinue unless directed by your doctor.    -- Indication: For .

## 2018-05-11 NOTE — DISCHARGE NOTE ADULT - HOSPITAL COURSE
CHIEF COMPLAINT: nonhealing ulcer    SUBJECTIVE: s/p OR POD 3.  Plan for picc today. S.Aureus in culture.    REVIEW OF SYSTEMS: All other review of systems is negative unless indicated above    Vital Signs Last 24 Hrs  T(C): 37 (11 May 2018 04:46), Max: 37 (11 May 2018 04:46)  T(F): 98.6 (11 May 2018 04:46), Max: 98.6 (11 May 2018 04:46)  HR: 81 (11 May 2018 04:46) (81 - 88)  BP: 104/62 (11 May 2018 04:46) (104/57 - 133/83)  RR: 18 (11 May 2018 04:46) (17 - 18)  SpO2: 97% (11 May 2018 04:46) (97% - 98%)    PHYSICAL EXAM:              Constitutional: NAD, awake and alert, well-developed  	HEENT: PERR, EOMI, Normal Hearing, MMM  	Neck: Soft and supple, No LAD, No JVD  	Respiratory: Breath sounds are clear bilaterally, No wheezing, rales or rhonchi  	Cardiovascular: S1 and S2, regular rate and rhythm, no Murmurs, gallops or rubs  	Gastrointestinal: Bowel Sounds present, soft, nontender, nondistended, no guarding, no rebound  	Extremities: No peripheral edema  	Vascular: 2+ peripheral pulses  	Neurological: A/O x 3, no focal deficits  	Musculoskeletal: 5/5 strength b/l upper and lower extremities              Skin: non-healing ulcer to base of 1st digit, sole of foot               9.9    7.45  )-----------( 306      ( 10 May 2018 07:22 )             31.4     138  |  105  |  10  ----------------------------<  262<H>  4.0   |  27  |  0.73    Ca    9.0      10 May 2018 07:22    A/P  57F with non-healing foot ulcer, r/o OM    # Non-healing foot ulcer  # Acute Osteomyelitis  - home abx vanc/cefepime via picc. to be adjusted as needed per ID after sens available  - MRI noted  - Podiatry appreicated- outpt f/u  - home wound care    # DM2:  - A1C 10.7  -  resume home meds    # HTN, HLD  -c/w home meds    Discussed with Dr. Doran    Time spent on discharge 35 minutes

## 2018-05-12 LAB
CULTURE RESULTS: SIGNIFICANT CHANGE UP
CULTURE RESULTS: SIGNIFICANT CHANGE UP
SPECIMEN SOURCE: SIGNIFICANT CHANGE UP
SPECIMEN SOURCE: SIGNIFICANT CHANGE UP

## 2018-05-15 DIAGNOSIS — M86.162 OTHER ACUTE OSTEOMYELITIS, LEFT TIBIA AND FIBULA: ICD-10-CM

## 2018-05-15 DIAGNOSIS — M21.42 FLAT FOOT [PES PLANUS] (ACQUIRED), LEFT FOOT: ICD-10-CM

## 2018-05-15 DIAGNOSIS — B35.1 TINEA UNGUIUM: ICD-10-CM

## 2018-05-15 DIAGNOSIS — M86.172 OTHER ACUTE OSTEOMYELITIS, LEFT ANKLE AND FOOT: ICD-10-CM

## 2018-05-15 DIAGNOSIS — E78.5 HYPERLIPIDEMIA, UNSPECIFIED: ICD-10-CM

## 2018-05-15 DIAGNOSIS — I10 ESSENTIAL (PRIMARY) HYPERTENSION: ICD-10-CM

## 2018-05-15 DIAGNOSIS — E11.69 TYPE 2 DIABETES MELLITUS WITH OTHER SPECIFIED COMPLICATION: ICD-10-CM

## 2018-05-15 DIAGNOSIS — Z88.2 ALLERGY STATUS TO SULFONAMIDES: ICD-10-CM

## 2018-05-15 DIAGNOSIS — E11.621 TYPE 2 DIABETES MELLITUS WITH FOOT ULCER: ICD-10-CM

## 2018-05-15 DIAGNOSIS — Z89.422 ACQUIRED ABSENCE OF OTHER LEFT TOE(S): ICD-10-CM

## 2018-05-15 DIAGNOSIS — L97.528 NON-PRESSURE CHRONIC ULCER OF OTHER PART OF LEFT FOOT WITH OTHER SPECIFIED SEVERITY: ICD-10-CM

## 2018-05-15 DIAGNOSIS — Z86.73 PERSONAL HISTORY OF TRANSIENT ISCHEMIC ATTACK (TIA), AND CEREBRAL INFARCTION WITHOUT RESIDUAL DEFICITS: ICD-10-CM

## 2018-05-15 DIAGNOSIS — M86.171 OTHER ACUTE OSTEOMYELITIS, RIGHT ANKLE AND FOOT: ICD-10-CM

## 2018-05-15 DIAGNOSIS — Z79.899 OTHER LONG TERM (CURRENT) DRUG THERAPY: ICD-10-CM

## 2018-12-09 NOTE — ED ADULT NURSE REASSESSMENT NOTE - COMFORT CARE
Lab Results   Component Value Date    HGBA1C 7 5 (H) 12/08/2018       Recent Labs      12/08/18   2045  12/09/18   0626  12/09/18   1031  12/09/18   1557   POCGLU  206*  133  213*  195*       Blood Sugar Average: Last 72 hrs:  (P) 164 [id number]     · continue lantus 10 units q h s   · monitor BS levels   · Sliding scale  · Hold metformin while in hospital warm blanket provided/side rails up/darkened lights

## 2019-10-04 NOTE — ED STATDOCS - MEDICAL DECISION MAKING DETAILS
Pt currently agitated who was sent in by podiatry for evaluation. Pt currently refusing blood work and imaging. Composite Graft Text: The defect edges were debeveled with a #15 scalpel blade.  Given the location of the defect, shape of the defect, the proximity to free margins and the fact the defect was full thickness a composite graft was deemed most appropriate.  The defect was outline and then transferred to the donor site.  A full thickness graft was then excised from the donor site. The graft was then placed in the primary defect, oriented appropriately and then sutured into place.  The secondary defect was then repaired using a primary closure.

## 2021-02-15 ENCOUNTER — EMERGENCY (EMERGENCY)
Facility: HOSPITAL | Age: 61
LOS: 0 days | Discharge: HOME | End: 2021-02-15
Attending: EMERGENCY MEDICINE
Payer: OTHER MISCELLANEOUS

## 2021-02-15 VITALS — WEIGHT: 190.04 LBS | HEIGHT: 65 IN

## 2021-02-15 VITALS
TEMPERATURE: 99 F | DIASTOLIC BLOOD PRESSURE: 83 MMHG | RESPIRATION RATE: 18 BRPM | OXYGEN SATURATION: 100 % | SYSTOLIC BLOOD PRESSURE: 159 MMHG | HEART RATE: 101 BPM

## 2021-02-15 DIAGNOSIS — Z89.422 ACQUIRED ABSENCE OF OTHER LEFT TOE(S): ICD-10-CM

## 2021-02-15 DIAGNOSIS — M25.511 PAIN IN RIGHT SHOULDER: ICD-10-CM

## 2021-02-15 DIAGNOSIS — Z88.2 ALLERGY STATUS TO SULFONAMIDES: ICD-10-CM

## 2021-02-15 DIAGNOSIS — I10 ESSENTIAL (PRIMARY) HYPERTENSION: ICD-10-CM

## 2021-02-15 DIAGNOSIS — Z89.422 ACQUIRED ABSENCE OF OTHER LEFT TOE(S): Chronic | ICD-10-CM
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
## 2021-02-15 DIAGNOSIS — E78.5 HYPERLIPIDEMIA, UNSPECIFIED: ICD-10-CM
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
## 2021-02-15 DIAGNOSIS — E11.9 TYPE 2 DIABETES MELLITUS WITHOUT COMPLICATIONS: ICD-10-CM
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
## 2021-02-15 DIAGNOSIS — Z98.1 ARTHRODESIS STATUS: Chronic | ICD-10-CM

## 2021-02-15 PROCEDURE — 99283 EMERGENCY DEPT VISIT LOW MDM: CPT | Mod: 25

## 2021-02-15 PROCEDURE — 99283 EMERGENCY DEPT VISIT LOW MDM: CPT

## 2021-02-15 PROCEDURE — 73030 X-RAY EXAM OF SHOULDER: CPT | Mod: 26,RT

## 2021-02-15 PROCEDURE — 73030 X-RAY EXAM OF SHOULDER: CPT | Mod: RT

## 2021-02-15 RX ORDER — IBUPROFEN 200 MG
600 TABLET ORAL ONCE
Refills: 0 | Status: DISCONTINUED | OUTPATIENT
Start: 2021-02-15 | End: 2021-02-15

## 2021-02-15 NOTE — ED PROVIDER NOTE - PHYSICAL EXAMINATION
Constitutional: NAD AAOx3  Eyes: PERRLA EOMI  Head: Normocephalic atraumatic  Mouth: MMM  Cardiac: regular rate   Resp: Lungs CTAB  GI: Abd s/nt/nd  Neuro: CN2-12 intact  Skin: No rashes  MSK: full ROM, normal sensation, normal peripheral pulses +TTP R deltoid, no bony TTP

## 2021-02-15 NOTE — ED PROVIDER NOTE - PROGRESS NOTE DETAILS
xray without fx, likely arthritis and ? calcification. pt to follow up with ortho appointment made. Christian Moseley M.D., Attending Physician
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
## 2021-02-15 NOTE — ED PROVIDER NOTE - PATIENT PORTAL LINK FT
You can access the FollowMyHealth Patient Portal offered by [facility] by registering at the following website: [email or web address]. By joining [employer]’s FollowMyHealth portal, you will also be able to view your health information using other applications (apps) compatible with our system.
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
## 2021-02-15 NOTE — ED PROVIDER NOTE - NS ED ROS FT
Constitutional: No fever or chills  Eyes: No visual changes  HEENT: No throat pain  CV: No chest pain  Resp: No SOB no cough  GI: No abd pain, nausea or vomiting  : No dysuria  MSK: +R shoulder/upper arm pain  Skin: No rash  Neuro: No headache

## 2021-02-15 NOTE — ED PROVIDER NOTE - NSFOLLOWUPINSTRUCTIONS_ED_ALL_ED_FT
1. return for worsening symptoms or anything concerning to you  2. take all home meds as prescribed  3. follow up with your pmd call to make an appointment  4. Take Tylenol 650 mg every 6 hours as needed for pain.  5. Take motrin 600mg PO Q6 hours prn pain  6. follow up with Dr. dS Monahan    WHAT YOU NEED TO KNOW:    A sprain happens when a ligament is stretched or torn. Ligaments are tough tissues that connect bones. Ligaments support your joints and keep your bones in place. They allow you to lift, lower, or rotate your arms and legs. A sprain may involve one or more ligaments.     DISCHARGE INSTRUCTIONS:    Return to the emergency department if:     You have numbness or tingling below the injury, such as in your fingers or toes.      The skin over your sprained area is blue or pale.       Your pain has increased or returned, even after you take pain medicine.    Contact your healthcare provider if:     Your symptoms do not better.      Your swelling has increased or returned.      Your joint becomes more weak or unstable.      You have questions or concerns about your condition or care.    Medicines:     Prescription pain medicine may be given. Ask how to take this medicine safely.      Acetaminophen decreases pain and fever. It is available without a doctor's order. Ask how much to take and how often to take it. Follow directions. Acetaminophen can cause liver damage if not taken correctly.      NSAIDs, such as ibuprofen, help decrease swelling, pain, and fever. This medicine is available with or without a doctor's order. NSAIDs can cause stomach bleeding or kidney problems in certain people. If you take blood thinner medicine, always ask your healthcare provider if NSAIDs are safe for you. Always read the medicine label and follow directions.      Take your medicine as directed. Contact your healthcare provider if you think your medicine is not helping or if you have side effects. Tell him or her if you are allergic to any medicine. Keep a list of the medicines, vitamins, and herbs you take. Include the amounts, and when and why you take them. Bring the list or the pill bottles to follow-up visits. Carry your medicine list with you in case of an emergency.    Support devices: Support services, such as an elastic bandage, splint, brace, or cast may be needed. These devices limit your movement and protect your joint. You may need to use crutches if the sprain is in your leg. This will help decrease your pain as you move around.     Self-care:     Rest your joint so that it can heal. Return to normal activities as directed.      Apply ice on your injury for 15 to 20 minutes every hour or as directed. Use an ice pack, or put crushed ice in a plastic bag. Cover it with a towel. Ice helps prevent tissue damage and decreases swelling and pain.      Compress the injured area as directed. Ask your healthcare provider if you should wrap an elastic bandage around your injured ligament. An elastic bandage provides support and helps decrease swelling and movement so your joint can heal.       Elevate the injured area above the level of your heart as often as you can. This will help decrease swelling and pain. Prop the injured area on pillows or blankets to keep it elevated comfortably.     Physical therapy: A physical therapist teaches you exercises to help improve movement and strength, and to decrease pain.     Prevent another sprain: Regular exercise can strengthen your muscles and help prevent another injury. Do the following before you begin or return to regular exercise or sports training:     Ask your healthcare provider about the activities you can do. Find out how long your ligament needs to heal. Do not do any physical activity until your healthcare provider says it is okay. If you start activity too soon, you may develop a more serious injury.       Always warm up and stretch before your regular exercise, sport, or physical activity.       Take it slow. Slowly increase how often and how long you exercise or train. Sudden increases in how often you train may cause you to overstretch or tear your ligament.       Use the right equipment. Always wear shoes that fit well and are made for the activity that you are doing. You may also use ankle supports, elbow and knee pads, or braces.     Follow up with your healthcare provider as directed: Write down your questions so you remember to ask them during your visits.

## 2021-02-15 NOTE — ED PROVIDER NOTE - CARE PROVIDER_API CALL
Elvira Beaver)  [facility]  [address] [place] [address]  Phone: [phone]  Fax: [phone]  Follow Up Time: 4-6 Days
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
## 2021-02-15 NOTE — ED PROVIDER NOTE - NS_ ATTENDINGSCRIBEDETAILS _ED_A_ED_FT
I, Christian Moseley MD,  performed the initial face to face bedside interview with this patient regarding history of present illness, review of symptoms and relevant past medical, social and family history.  I completed an independent physical examination.  I was the initial provider who evaluated this patient.  The history, relevant review of systems, past medical and surgical history, medical decision making, and physical examination was documented by the scribe in my presence and I attest to the accuracy of the documentation.

## 2021-03-08 ENCOUNTER — APPOINTMENT (OUTPATIENT)
Dept: ORTHOPEDIC SURGERY | Facility: CLINIC | Age: 61
End: 2021-03-08

## 2021-04-27 NOTE — ED STATDOCS - CROS ED CONS ALL NEG
Patient called in for med refill. No medications are listed for patient. Patient states she completed a evisit and would like to be prescribed something for the issues she is having.  Please advise [phone] negative...

## 2021-06-11 NOTE — ED ADULT NURSE NOTE - NSSEPSISSUSPECTED_ED_A_ED
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 Pt states that she tripped and fell on her L side, R eye noted to be swollen a/w small laceration to her lower lip. Pt denies CP/SOB/LOC/dizziness/N/V/vision changes. On exam, pt is breathing spontaneously, able to speak full sentences w/o difficulty, saturating 97% RA. VSS.

## 2021-07-22 ENCOUNTER — OUTPATIENT (OUTPATIENT)
Dept: OUTPATIENT SERVICES | Facility: HOSPITAL | Age: 61
LOS: 1 days | End: 2021-07-22
Payer: MEDICARE

## 2021-07-22 DIAGNOSIS — E11.49 TYPE 2 DIABETES MELLITUS WITH OTHER DIABETIC NEUROLOGICAL COMPLICATION: ICD-10-CM

## 2021-07-22 DIAGNOSIS — Z89.422 ACQUIRED ABSENCE OF OTHER LEFT TOE(S): Chronic | ICD-10-CM

## 2021-07-22 DIAGNOSIS — Z83.3 FAMILY HISTORY OF DIABETES MELLITUS: ICD-10-CM

## 2021-07-22 DIAGNOSIS — Z82.49 FAMILY HISTORY OF ISCHEMIC HEART DISEASE AND OTHER DISEASES OF THE CIRCULATORY SYSTEM: ICD-10-CM

## 2021-07-22 DIAGNOSIS — R26.81 UNSTEADINESS ON FEET: ICD-10-CM

## 2021-07-22 DIAGNOSIS — R01.1 CARDIAC MURMUR, UNSPECIFIED: ICD-10-CM

## 2021-07-22 DIAGNOSIS — M86.071 ACUTE HEMATOGENOUS OSTEOMYELITIS, RIGHT ANKLE AND FOOT: ICD-10-CM

## 2021-07-22 DIAGNOSIS — Z79.899 OTHER LONG TERM (CURRENT) DRUG THERAPY: ICD-10-CM

## 2021-07-22 DIAGNOSIS — Z86.73 PERSONAL HISTORY OF TRANSIENT ISCHEMIC ATTACK (TIA), AND CEREBRAL INFARCTION WITHOUT RESIDUAL DEFICITS: ICD-10-CM

## 2021-07-22 DIAGNOSIS — L97.522 NON-PRESSURE CHRONIC ULCER OF OTHER PART OF LEFT FOOT WITH FAT LAYER EXPOSED: ICD-10-CM

## 2021-07-22 DIAGNOSIS — Z88.2 ALLERGY STATUS TO SULFONAMIDES: ICD-10-CM

## 2021-07-22 DIAGNOSIS — Z84.1 FAMILY HISTORY OF DISORDERS OF KIDNEY AND URETER: ICD-10-CM

## 2021-07-22 DIAGNOSIS — Z98.1 ARTHRODESIS STATUS: Chronic | ICD-10-CM

## 2021-07-22 DIAGNOSIS — Z80.9 FAMILY HISTORY OF MALIGNANT NEOPLASM, UNSPECIFIED: ICD-10-CM

## 2021-07-22 DIAGNOSIS — J45.909 UNSPECIFIED ASTHMA, UNCOMPLICATED: ICD-10-CM

## 2021-07-22 DIAGNOSIS — L97.512 NON-PRESSURE CHRONIC ULCER OF OTHER PART OF RIGHT FOOT WITH FAT LAYER EXPOSED: ICD-10-CM

## 2021-07-22 PROCEDURE — 83036 HEMOGLOBIN GLYCOSYLATED A1C: CPT

## 2021-07-22 PROCEDURE — 87070 CULTURE OTHR SPECIMN AEROBIC: CPT

## 2021-07-22 PROCEDURE — 73630 X-RAY EXAM OF FOOT: CPT | Mod: RT

## 2021-07-22 PROCEDURE — 73630 X-RAY EXAM OF FOOT: CPT | Mod: 26,RT

## 2021-07-22 PROCEDURE — 85025 COMPLETE CBC W/AUTO DIFF WBC: CPT

## 2021-07-22 PROCEDURE — 86140 C-REACTIVE PROTEIN: CPT

## 2021-07-22 PROCEDURE — 11042 DBRDMT SUBQ TIS 1ST 20SQCM/<: CPT

## 2021-07-22 PROCEDURE — 85652 RBC SED RATE AUTOMATED: CPT

## 2021-07-22 PROCEDURE — 80053 COMPREHEN METABOLIC PANEL: CPT

## 2021-07-22 PROCEDURE — 87077 CULTURE AEROBIC IDENTIFY: CPT

## 2021-07-22 PROCEDURE — 93922 UPR/L XTREMITY ART 2 LEVELS: CPT

## 2021-07-22 PROCEDURE — G0463: CPT

## 2021-07-22 PROCEDURE — 87186 SC STD MICRODIL/AGAR DIL: CPT

## 2021-07-23 DIAGNOSIS — L97.522 NON-PRESSURE CHRONIC ULCER OF OTHER PART OF LEFT FOOT WITH FAT LAYER EXPOSED: ICD-10-CM

## 2021-07-26 ENCOUNTER — OUTPATIENT (OUTPATIENT)
Dept: OUTPATIENT SERVICES | Facility: HOSPITAL | Age: 61
LOS: 1 days | End: 2021-07-26

## 2021-07-26 ENCOUNTER — INPATIENT (INPATIENT)
Facility: HOSPITAL | Age: 61
LOS: 1 days | Discharge: HOME | DRG: 638 | End: 2021-07-28
Attending: FAMILY MEDICINE | Admitting: FAMILY MEDICINE
Payer: MEDICARE

## 2021-07-26 VITALS — HEIGHT: 65 IN | WEIGHT: 179.9 LBS

## 2021-07-26 DIAGNOSIS — Z84.1 FAMILY HISTORY OF DISORDERS OF KIDNEY AND URETER: ICD-10-CM

## 2021-07-26 DIAGNOSIS — Z98.1 ARTHRODESIS STATUS: Chronic | ICD-10-CM

## 2021-07-26 DIAGNOSIS — Z87.891 PERSONAL HISTORY OF NICOTINE DEPENDENCE: ICD-10-CM

## 2021-07-26 DIAGNOSIS — E11.49 TYPE 2 DIABETES MELLITUS WITH OTHER DIABETIC NEUROLOGICAL COMPLICATION: ICD-10-CM

## 2021-07-26 DIAGNOSIS — M86.9 OSTEOMYELITIS, UNSPECIFIED: ICD-10-CM

## 2021-07-26 DIAGNOSIS — R01.1 CARDIAC MURMUR, UNSPECIFIED: ICD-10-CM

## 2021-07-26 DIAGNOSIS — Z83.3 FAMILY HISTORY OF DIABETES MELLITUS: ICD-10-CM

## 2021-07-26 DIAGNOSIS — Z80.9 FAMILY HISTORY OF MALIGNANT NEOPLASM, UNSPECIFIED: ICD-10-CM

## 2021-07-26 DIAGNOSIS — Z86.73 PERSONAL HISTORY OF TRANSIENT ISCHEMIC ATTACK (TIA), AND CEREBRAL INFARCTION WITHOUT RESIDUAL DEFICITS: ICD-10-CM

## 2021-07-26 DIAGNOSIS — J45.909 UNSPECIFIED ASTHMA, UNCOMPLICATED: ICD-10-CM

## 2021-07-26 DIAGNOSIS — R26.81 UNSTEADINESS ON FEET: ICD-10-CM

## 2021-07-26 DIAGNOSIS — L03.115 CELLULITIS OF RIGHT LOWER LIMB: ICD-10-CM

## 2021-07-26 DIAGNOSIS — Z89.422 ACQUIRED ABSENCE OF OTHER LEFT TOE(S): Chronic | ICD-10-CM

## 2021-07-26 DIAGNOSIS — Z79.899 OTHER LONG TERM (CURRENT) DRUG THERAPY: ICD-10-CM

## 2021-07-26 DIAGNOSIS — Z82.49 FAMILY HISTORY OF ISCHEMIC HEART DISEASE AND OTHER DISEASES OF THE CIRCULATORY SYSTEM: ICD-10-CM

## 2021-07-26 DIAGNOSIS — M86.071 ACUTE HEMATOGENOUS OSTEOMYELITIS, RIGHT ANKLE AND FOOT: ICD-10-CM

## 2021-07-26 DIAGNOSIS — L97.522 NON-PRESSURE CHRONIC ULCER OF OTHER PART OF LEFT FOOT WITH FAT LAYER EXPOSED: ICD-10-CM

## 2021-07-26 DIAGNOSIS — E11.59 TYPE 2 DIABETES MELLITUS WITH OTHER CIRCULATORY COMPLICATIONS: ICD-10-CM

## 2021-07-26 DIAGNOSIS — Z88.2 ALLERGY STATUS TO SULFONAMIDES: ICD-10-CM

## 2021-07-26 DIAGNOSIS — L97.512 NON-PRESSURE CHRONIC ULCER OF OTHER PART OF RIGHT FOOT WITH FAT LAYER EXPOSED: ICD-10-CM

## 2021-07-26 DIAGNOSIS — I10 ESSENTIAL (PRIMARY) HYPERTENSION: ICD-10-CM

## 2021-07-26 LAB
ALBUMIN SERPL ELPH-MCNC: 2.9 G/DL — LOW (ref 3.3–5)
ALP SERPL-CCNC: 102 U/L — SIGNIFICANT CHANGE UP (ref 40–120)
ALT FLD-CCNC: 20 U/L — SIGNIFICANT CHANGE UP (ref 12–78)
ANION GAP SERPL CALC-SCNC: 2 MMOL/L — LOW (ref 5–17)
APPEARANCE UR: CLEAR — SIGNIFICANT CHANGE UP
AST SERPL-CCNC: 14 U/L — LOW (ref 15–37)
BASOPHILS # BLD AUTO: 0.08 K/UL — SIGNIFICANT CHANGE UP (ref 0–0.2)
BASOPHILS NFR BLD AUTO: 0.7 % — SIGNIFICANT CHANGE UP (ref 0–2)
BILIRUB SERPL-MCNC: 0.2 MG/DL — SIGNIFICANT CHANGE UP (ref 0.2–1.2)
BILIRUB UR-MCNC: NEGATIVE — SIGNIFICANT CHANGE UP
BUN SERPL-MCNC: 15 MG/DL — SIGNIFICANT CHANGE UP (ref 7–23)
CALCIUM SERPL-MCNC: 9.7 MG/DL — SIGNIFICANT CHANGE UP (ref 8.5–10.1)
CHLORIDE SERPL-SCNC: 103 MMOL/L — SIGNIFICANT CHANGE UP (ref 96–108)
CO2 SERPL-SCNC: 31 MMOL/L — SIGNIFICANT CHANGE UP (ref 22–31)
COLOR SPEC: YELLOW — SIGNIFICANT CHANGE UP
CREAT SERPL-MCNC: 0.7 MG/DL — SIGNIFICANT CHANGE UP (ref 0.5–1.3)
DIFF PNL FLD: NEGATIVE — SIGNIFICANT CHANGE UP
EOSINOPHIL # BLD AUTO: 0.21 K/UL — SIGNIFICANT CHANGE UP (ref 0–0.5)
EOSINOPHIL NFR BLD AUTO: 1.8 % — SIGNIFICANT CHANGE UP (ref 0–6)
ERYTHROCYTE [SEDIMENTATION RATE] IN BLOOD: 73 MM/HR — HIGH (ref 0–20)
GLUCOSE SERPL-MCNC: 213 MG/DL — HIGH (ref 70–99)
GLUCOSE UR QL: 250 MG/DL
HCT VFR BLD CALC: 33.7 % — LOW (ref 34.5–45)
HGB BLD-MCNC: 10.6 G/DL — LOW (ref 11.5–15.5)
IMM GRANULOCYTES NFR BLD AUTO: 0.3 % — SIGNIFICANT CHANGE UP (ref 0–1.5)
KETONES UR-MCNC: NEGATIVE — SIGNIFICANT CHANGE UP
LEUKOCYTE ESTERASE UR-ACNC: NEGATIVE — SIGNIFICANT CHANGE UP
LYMPHOCYTES # BLD AUTO: 1.86 K/UL — SIGNIFICANT CHANGE UP (ref 1–3.3)
LYMPHOCYTES # BLD AUTO: 15.7 % — SIGNIFICANT CHANGE UP (ref 13–44)
MCHC RBC-ENTMCNC: 25.7 PG — LOW (ref 27–34)
MCHC RBC-ENTMCNC: 31.5 GM/DL — LOW (ref 32–36)
MCV RBC AUTO: 81.6 FL — SIGNIFICANT CHANGE UP (ref 80–100)
MONOCYTES # BLD AUTO: 1.58 K/UL — HIGH (ref 0–0.9)
MONOCYTES NFR BLD AUTO: 13.4 % — SIGNIFICANT CHANGE UP (ref 2–14)
NEUTROPHILS # BLD AUTO: 8.05 K/UL — HIGH (ref 1.8–7.4)
NEUTROPHILS NFR BLD AUTO: 68.1 % — SIGNIFICANT CHANGE UP (ref 43–77)
NITRITE UR-MCNC: NEGATIVE — SIGNIFICANT CHANGE UP
PH UR: 5 — SIGNIFICANT CHANGE UP (ref 5–8)
PLATELET # BLD AUTO: 379 K/UL — SIGNIFICANT CHANGE UP (ref 150–400)
POTASSIUM SERPL-MCNC: 3.8 MMOL/L — SIGNIFICANT CHANGE UP (ref 3.5–5.3)
POTASSIUM SERPL-SCNC: 3.8 MMOL/L — SIGNIFICANT CHANGE UP (ref 3.5–5.3)
PROT SERPL-MCNC: 7.9 GM/DL — SIGNIFICANT CHANGE UP (ref 6–8.3)
PROT UR-MCNC: 15 MG/DL
RBC # BLD: 4.13 M/UL — SIGNIFICANT CHANGE UP (ref 3.8–5.2)
RBC # FLD: 14.1 % — SIGNIFICANT CHANGE UP (ref 10.3–14.5)
SARS-COV-2 RNA SPEC QL NAA+PROBE: SIGNIFICANT CHANGE UP
SODIUM SERPL-SCNC: 136 MMOL/L — SIGNIFICANT CHANGE UP (ref 135–145)
SP GR SPEC: 1.01 — SIGNIFICANT CHANGE UP (ref 1.01–1.02)
UROBILINOGEN FLD QL: NEGATIVE MG/DL — SIGNIFICANT CHANGE UP
WBC # BLD: 11.82 K/UL — HIGH (ref 3.8–10.5)
WBC # FLD AUTO: 11.82 K/UL — HIGH (ref 3.8–10.5)

## 2021-07-26 PROCEDURE — 85027 COMPLETE CBC AUTOMATED: CPT

## 2021-07-26 PROCEDURE — 71045 X-RAY EXAM CHEST 1 VIEW: CPT

## 2021-07-26 PROCEDURE — 80202 ASSAY OF VANCOMYCIN: CPT

## 2021-07-26 PROCEDURE — 93010 ELECTROCARDIOGRAM REPORT: CPT

## 2021-07-26 PROCEDURE — 85610 PROTHROMBIN TIME: CPT

## 2021-07-26 PROCEDURE — 86769 SARS-COV-2 COVID-19 ANTIBODY: CPT

## 2021-07-26 PROCEDURE — 71045 X-RAY EXAM CHEST 1 VIEW: CPT | Mod: 26

## 2021-07-26 PROCEDURE — 82962 GLUCOSE BLOOD TEST: CPT

## 2021-07-26 PROCEDURE — 36415 COLL VENOUS BLD VENIPUNCTURE: CPT

## 2021-07-26 PROCEDURE — 85730 THROMBOPLASTIN TIME PARTIAL: CPT

## 2021-07-26 PROCEDURE — 86803 HEPATITIS C AB TEST: CPT

## 2021-07-26 PROCEDURE — 99285 EMERGENCY DEPT VISIT HI MDM: CPT

## 2021-07-26 PROCEDURE — 73630 X-RAY EXAM OF FOOT: CPT | Mod: 26,RT

## 2021-07-26 RX ORDER — ATORVASTATIN CALCIUM 80 MG/1
1 TABLET, FILM COATED ORAL
Qty: 0 | Refills: 0 | DISCHARGE

## 2021-07-26 RX ORDER — CEFEPIME 1 G/1
1000 INJECTION, POWDER, FOR SOLUTION INTRAMUSCULAR; INTRAVENOUS ONCE
Refills: 0 | Status: DISCONTINUED | OUTPATIENT
Start: 2021-07-26 | End: 2021-07-26

## 2021-07-26 RX ORDER — INSULIN GLARGINE 100 [IU]/ML
20 INJECTION, SOLUTION SUBCUTANEOUS AT BEDTIME
Refills: 0 | Status: DISCONTINUED | OUTPATIENT
Start: 2021-07-26 | End: 2021-07-27

## 2021-07-26 RX ORDER — PIPERACILLIN AND TAZOBACTAM 4; .5 G/20ML; G/20ML
3.38 INJECTION, POWDER, LYOPHILIZED, FOR SOLUTION INTRAVENOUS EVERY 6 HOURS
Refills: 0 | Status: DISCONTINUED | OUTPATIENT
Start: 2021-07-26 | End: 2021-07-26

## 2021-07-26 RX ORDER — DEXTROSE 50 % IN WATER 50 %
15 SYRINGE (ML) INTRAVENOUS ONCE
Refills: 0 | Status: DISCONTINUED | OUTPATIENT
Start: 2021-07-26 | End: 2021-07-28

## 2021-07-26 RX ORDER — ENOXAPARIN SODIUM 100 MG/ML
40 INJECTION SUBCUTANEOUS DAILY
Refills: 0 | Status: DISCONTINUED | OUTPATIENT
Start: 2021-07-27 | End: 2021-07-28

## 2021-07-26 RX ORDER — ASPIRIN/CALCIUM CARB/MAGNESIUM 324 MG
81 TABLET ORAL DAILY
Refills: 0 | Status: DISCONTINUED | OUTPATIENT
Start: 2021-07-26 | End: 2021-07-28

## 2021-07-26 RX ORDER — INSULIN DEGLUDEC 100 U/ML
20 INJECTION, SOLUTION SUBCUTANEOUS
Qty: 0 | Refills: 0 | DISCHARGE

## 2021-07-26 RX ORDER — LEVOTHYROXINE SODIUM 125 MCG
25 TABLET ORAL DAILY
Refills: 0 | Status: DISCONTINUED | OUTPATIENT
Start: 2021-07-26 | End: 2021-07-28

## 2021-07-26 RX ORDER — SODIUM CHLORIDE 9 MG/ML
1000 INJECTION, SOLUTION INTRAVENOUS
Refills: 0 | Status: DISCONTINUED | OUTPATIENT
Start: 2021-07-26 | End: 2021-07-28

## 2021-07-26 RX ORDER — ACETAMINOPHEN 500 MG
650 TABLET ORAL ONCE
Refills: 0 | Status: COMPLETED | OUTPATIENT
Start: 2021-07-26 | End: 2021-07-27

## 2021-07-26 RX ORDER — LISINOPRIL 2.5 MG/1
10 TABLET ORAL DAILY
Refills: 0 | Status: DISCONTINUED | OUTPATIENT
Start: 2021-07-26 | End: 2021-07-28

## 2021-07-26 RX ORDER — SODIUM CHLORIDE 9 MG/ML
3 INJECTION INTRAMUSCULAR; INTRAVENOUS; SUBCUTANEOUS EVERY 8 HOURS
Refills: 0 | Status: DISCONTINUED | OUTPATIENT
Start: 2021-07-26 | End: 2021-07-28

## 2021-07-26 RX ORDER — ATORVASTATIN CALCIUM 80 MG/1
80 TABLET, FILM COATED ORAL AT BEDTIME
Refills: 0 | Status: DISCONTINUED | OUTPATIENT
Start: 2021-07-26 | End: 2021-07-28

## 2021-07-26 RX ORDER — GLUCAGON INJECTION, SOLUTION 0.5 MG/.1ML
1 INJECTION, SOLUTION SUBCUTANEOUS ONCE
Refills: 0 | Status: DISCONTINUED | OUTPATIENT
Start: 2021-07-26 | End: 2021-07-28

## 2021-07-26 RX ORDER — METFORMIN HYDROCHLORIDE 850 MG/1
1 TABLET ORAL
Qty: 0 | Refills: 0 | DISCHARGE

## 2021-07-26 RX ORDER — ACETAMINOPHEN 500 MG
975 TABLET ORAL ONCE
Refills: 0 | Status: COMPLETED | OUTPATIENT
Start: 2021-07-26 | End: 2021-07-26

## 2021-07-26 RX ORDER — DEXTROSE 50 % IN WATER 50 %
25 SYRINGE (ML) INTRAVENOUS ONCE
Refills: 0 | Status: DISCONTINUED | OUTPATIENT
Start: 2021-07-26 | End: 2021-07-28

## 2021-07-26 RX ORDER — PIPERACILLIN AND TAZOBACTAM 4; .5 G/20ML; G/20ML
3.38 INJECTION, POWDER, LYOPHILIZED, FOR SOLUTION INTRAVENOUS EVERY 8 HOURS
Refills: 0 | Status: DISCONTINUED | OUTPATIENT
Start: 2021-07-26 | End: 2021-07-27

## 2021-07-26 RX ORDER — GLYBURIDE 5 MG
10 TABLET ORAL
Qty: 0 | Refills: 0 | DISCHARGE

## 2021-07-26 RX ORDER — CEFEPIME 1 G/1
1000 INJECTION, POWDER, FOR SOLUTION INTRAMUSCULAR; INTRAVENOUS ONCE
Refills: 0 | Status: COMPLETED | OUTPATIENT
Start: 2021-07-26 | End: 2021-07-26

## 2021-07-26 RX ORDER — VANCOMYCIN HCL 1 G
1250 VIAL (EA) INTRAVENOUS ONCE
Refills: 0 | Status: COMPLETED | OUTPATIENT
Start: 2021-07-26 | End: 2021-07-26

## 2021-07-26 RX ORDER — MORPHINE SULFATE 50 MG/1
4 CAPSULE, EXTENDED RELEASE ORAL ONCE
Refills: 0 | Status: DISCONTINUED | OUTPATIENT
Start: 2021-07-26 | End: 2021-07-26

## 2021-07-26 RX ORDER — INSULIN LISPRO 100/ML
VIAL (ML) SUBCUTANEOUS
Refills: 0 | Status: DISCONTINUED | OUTPATIENT
Start: 2021-07-26 | End: 2021-07-28

## 2021-07-26 RX ORDER — VANCOMYCIN HCL 1 G
1000 VIAL (EA) INTRAVENOUS ONCE
Refills: 0 | Status: DISCONTINUED | OUTPATIENT
Start: 2021-07-26 | End: 2021-07-26

## 2021-07-26 RX ADMIN — Medication 975 MILLIGRAM(S): at 20:20

## 2021-07-26 RX ADMIN — CEFEPIME 1000 MILLIGRAM(S): 1 INJECTION, POWDER, FOR SOLUTION INTRAMUSCULAR; INTRAVENOUS at 19:19

## 2021-07-26 RX ADMIN — Medication 166.67 MILLIGRAM(S): at 19:19

## 2021-07-26 NOTE — H&P ADULT - PROBLEM SELECTOR PLAN 1
- podiatry consult appreciated  - local wound care per podiatry recs  - monitor CBC, vitals and temp curve  - IV Zosyn, Vanc received in ED  - ID consult

## 2021-07-26 NOTE — H&P ADULT - NSHPPHYSICALEXAM_GEN_ALL_CORE
Dr Mckinnon,please see her message below and advise   HEENT:   pupils equal and reactive, EOMI, no oropharyngeal lesions, erythema, exudates, oral thrush    NECK:   supple, no carotid bruits, no palpable lymph nodes, no thyromegaly    CV:  +S1, +S2, regular, no murmurs or rubs    RESP:   lungs clear to auscultation bilaterally, no wheezing, rales, rhonchi, good air entry bilaterally    BREAST:  not examined    GI:  abdomen soft, non-tender, non-distended, normal BS, no bruits, no abdominal masses, no palpable masses    RECTAL:  not examined    :  not examined    MSK:   normal muscle tone, no atrophy, no rigidity, no contractions    EXT:   no clubbing, no cyanosis, no edema, no calf pain, swelling or erythema    VASCULAR:  pulses equal and symmetric in the upper and lower extremities    NEURO:  AAOX3, no focal neurological deficits, follows all commands, able to move extremities spontaneously    SKIN:  no ulcers, lesions or rashes HEENT:   pupils equal and reactive, EOMI, no oropharyngeal lesions, erythema, exudates, oral thrush    NECK:   supple, no carotid bruits, no palpable lymph nodes, no thyromegaly    CV:  +S1, +S2, regular, no murmurs or rubs    RESP:   lungs clear to auscultation bilaterally, no wheezing, rales, rhonchi, good air entry bilaterally    BREAST:  not examined    GI:  abdomen soft, non-tender, non-distended, normal BS, no bruits, no abdominal masses, no palpable masses    RECTAL:  not examined    :  not examined    LE exam per podiatry just prior to my evaluation (see below), fresh dressing placed, not removed-   Vascular: Temperature gradient is warm to warm b/l, warmer to the Rt foot, palpable pulses, DP/PT is 2/4 b/l, capillary re-fill time is about 5 sec to digits 1-5 b/l, except for the Rt hallux which is absent. + 2 pitting edema to the RLE extending from the dorsum of the Rt foot to the mid Rt leg.   Neuro: Diminished protective sensation to the foot and digits 1-5 b/l.  Derm: Noted full thickness ulcerative lesion to the dorsum of the Rt hallux, that is connected to another full thickness ulcerative lesion to the plantar aspect of the Rt hallux, with + malodorous minimal purulent drainage, + probing to the proximal phalanx, + undermining, + tunneling, and + clinical signs of acute infection to the Rt forefoot.  Musculoskeletal: Manual muscle testing is 5/5 in all muscle groups of the foot/ankle b/l. 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 Evidence of toe amputation to the Lt foot        NEURO:  AAOX3, no focal neurological deficits, follows all commands, able to move extremities spontaneously    SKIN:  no ulcers, lesions or rashes

## 2021-07-26 NOTE — ED STATDOCS - OBJECTIVE STATEMENT
60 y/o female with PMHx of IDDM, HTN, HLD presents to ED for right foot first toe infection persistent for few weeks. Pt states Dr. Cates recommended she comes in for PICC line placement and IV abx for MRSA infection to right first toe, confirmed by culture done on Thursday. Denies fevers. Finished course of doxy without relief. Denies fevers, chills, N/V/D. Hx of MRSA x3. Allergies: Sulfa drugs.

## 2021-07-26 NOTE — ED STATDOCS - ATTENDING CONTRIBUTION TO CARE
Jose Eduardo LAZAR: I performed the initial face to face bedside interview with this patient regarding history of present illness, review of symptoms and relevant past medical, social and family history.  I completed an independent physical examination.  I was the initial provider who evaluated this patient. I have signed out the follow up of any pending tests (i.e. labs, radiological studies) to the ACP.  I have communicated the patient’s plan of care and disposition with the ACP.  The history, relevant review of systems, past medical and surgical history, medical decision making, and physical examination was documented by the scribe in my presence and I attest to the accuracy of the documentation.

## 2021-07-26 NOTE — ED ADULT TRIAGE NOTE - CHIEF COMPLAINT QUOTE
sent in by dr. borjas for right toe infection, diagnosed with MRSA, need admission, c/o pain to right toe

## 2021-07-26 NOTE — ED ADULT NURSE NOTE - OBJECTIVE STATEMENT
Pt presents to ER c/o of right foot/toe pain/infection. Onset of symptoms began 3 weeks PTA r/t toe injury. Pt sent from  office for PICC line. Pt has confirmed MRSA infection from culture on Thursday. AO x 3 oriented to baseline, normal breathing pattern with no difficulty. Gait steady

## 2021-07-26 NOTE — ED STATDOCS - CLINICAL SUMMARY MEDICAL DECISION MAKING FREE TEXT BOX
admit to medicine for MRSA osteomyelitis of right great toe. 60 y/o female with PMHx of IDDM, HTN, HLD presents to ED for right foot first toe infection persistent for few weeks. Pt states Dr. Cates recommended she comes in for PICC line placement and IV abx for MRSA infection to right first toe, confirmed by culture done on Thursday. Denies fevers. Finished course of doxy without relief exam vss non toxic PE as above ddx likely OM, plan to admit to medicine for MRSA osteomyelitis of right great toe.

## 2021-07-26 NOTE — CONSULT NOTE ADULT - ASSESSMENT
Assessment: 60 y/o F Pt has been seen by podiatry team for the evaluation of;  - Rt hallux, distal phalanx osteomyelitis   - RLE cellulitis  - Rt hallux, non healing ulcerative lesion  - Pain to the Rt foot, and difficulty to ambulate.      Plan:  - Pt is evaluated and chart reviewed.  - Discussed the potential causes and plan of management with the Pt.  - Discussed all the different aspects of treatment with the Pt including conservative and potential surgical options.  - Educated the Pt about the necessity to be admitted, to start a course of IV Abx, in an attempt to contain the infection.  - Educated Pt about the importance of having a tight glycemic control, to avoid the development of any further complications.  - Pt demonstrated verbal understanding, and agreed to plan of treatment   - Flushed the Rt hallux wound with betadine/NS flush, and applied betadine soaked dressing to the Rt hallux.  - Recommend to start with Vancomycin to target the MRSA, isolated earlier in the wound Cx, until getting the IV Abx optimized by ID, appreciated.  - Recommend ID, and vascular consults.  - Recommend admission under medicine, appreciated.   - Podiatry will follow the case in house. Assessment: 60 y/o F Pt has been seen by podiatry team for the evaluation of;  - Rt hallux, distal phalanx osteomyelitis   - RLE cellulitis  - Rt hallux, non healing ulcerative lesion  - Pain to the Rt foot, and difficulty to ambulate.      Plan:  - Pt is evaluated and chart reviewed.  - Discussed the potential causes and plan of management with the Pt.  - Discussed all the different aspects of treatment with the Pt including conservative and potential surgical options.  - Discussed the Cx results with the patient, and explained to the patient different modalities of treatment.   - Educated the Pt about the necessity to be admitted, to start a course of IV Abx, in an attempt to contain the infection.  - Educated Pt about the importance of having a tight glycemic control, to avoid the development of any further complications.  - Pt demonstrated verbal understanding, and agreed to plan of treatment   - Marked the RLE with sterile marker, to monitor the progress of the cellulitis.  - Flushed the Rt hallux wound with betadine/NS flush, and applied betadine soaked dressing to the Rt hallux.  - Recommend to start with Vancomycin to target the MRSA, isolated earlier in the wound Cx, until getting the IV Abx optimized by ID, appreciated.  - Recommend ID, and vascular consults.  - Recommend admission under medicine, appreciated.   - Podiatry will follow the case in house.

## 2021-07-26 NOTE — ED STATDOCS - CARE PLAN
Principal Discharge DX:	Osteomyelitis of toe of right foot  Secondary Diagnosis:	MRSA cellulitis of right foot

## 2021-07-26 NOTE — H&P ADULT - NSICDXFAMILYHX_GEN_ALL_CORE_FT
FAMILY HISTORY:  Father  Still living? Unknown  FHx: diabetes mellitus, Age at diagnosis: Age Unknown    Sibling  Still living? Unknown  FH: ovarian cancer, Age at diagnosis: Age Unknown

## 2021-07-26 NOTE — H&P ADULT - NSHPLABSRESULTS_GEN_ALL_CORE
10.6   11.82 )-----------( 379      ( 2021 18:44 )             33.7         136  |  103  |  15  ----------------------------<  213<H>  3.8   |  31  |  0.70    Ca    9.7      2021 18:44    TPro  7.9  /  Alb  2.9<L>  /  TBili  0.2  /  DBili  x   /  AST  14<L>  /  ALT  20  /  AlkPhos  102          LIVER FUNCTIONS - ( 2021 18:44 )  Alb: 2.9 g/dL / Pro: 7.9 gm/dL / ALK PHOS: 102 U/L / ALT: 20 U/L / AST: 14 U/L / GGT: x             Urinalysis Basic - ( 2021 18:57 )    Color: Yellow / Appearance: Clear / S.010 / pH: x  Gluc: x / Ketone: Negative  / Bili: Negative / Urobili: Negative mg/dL   Blood: x / Protein: 15 mg/dL / Nitrite: Negative   Leuk Esterase: Negative / RBC: 3-5 /HPF / WBC 0-2   Sq Epi: x / Non Sq Epi: Occasional / Bacteria: Occasional          Blood, Urine: Negative ( @ 18:57)

## 2021-07-26 NOTE — H&P ADULT - HISTORY OF PRESENT ILLNESS
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 Pt states that she has been under continuous care of her private podiatrist Dr. Bermudez for the management of non healing ulcerative lesion to the Rt hallux. Pt reports that the ulcer has worsened over the last few days dramatically and started to drain some purulent discharge, that Dr. Bermudez has cultured and grew MRSA. Inflammatory markers elevated 4 days ago. Failed a course of outpatient PO Doxycycline.  Pt reports that the RLE started to swell as well. Pt denies fever, or chills, and denies N/V/SOB at this time. Dr. Bermudez  urged the patient to come immediately to the ED,  Vanco and Cefepime received in ED 62 y/o PMH DM (uncontrolled A1C > 11), MRSA with prior OM left foot and ampuitations who presents to the ED c/o pain and swelling to the Rt foot, and non healing ulcer to the Rt hallux. Pt states that she has been under continuous care of her private podiatrist Dr. Bermudez for the management of non healing ulcerative lesion to the Rt hallux. Pt reports that the ulcer has worsened over the last few days dramatically and started to drain some purulent discharge, that Dr. Bermudez has cultured and grew MRSA. Inflammatory markers elevated 4 days ago and xray plain film confirmed changes consistent with osteomyelitis. Failed a course of outpatient PO Doxycycline.  Pt reports that the RLE started to swell as well. Pt denies fever, or chills, and denies N/V/SOB at this time. Dr. Bermudez  urged the patient to come immediately to the ED,  Vanco and Cefepime received in ED

## 2021-07-26 NOTE — ED STATDOCS - PROGRESS NOTE DETAILS
signed Enid Urrutia PA-C Pt seen initially in intake by Dr. Whitt.   61F sent for admission by podiatry Dr Arredondo for MRSA osteomyelitis of right great toe. pt was on outpt doxy, seen in wound care today and set to be admitted, PICC line placed, IV abx. Pt alert, NAD. PMD [place]. PMH IDDM, HTN, HLD. signed Enid Urrutia PA-C   Podiatry resident Basem in ED to see pt. signed Enid Urrutia PA-C   Podiatry resident Basem in ED to see pt. Admit pt to medicine. signed Enid Urrutia PA-C   Case discussed with and admission accepted by hospitalist Dr. Briggs.

## 2021-07-26 NOTE — H&P ADULT - PROBLEM SELECTOR PLAN 3
- consistent carb diet,  recent A1c > 11.  - endocrine and nutrition consults  - Ademolog sliding scale, continue lantus 20u HS  - increase lantus and add nutritional insulin based on sliding scale needs over 24h

## 2021-07-26 NOTE — ED STATDOCS - PHYSICAL EXAMINATION
VITALS: Initial triage and subsequent vitals have been reviewed by me.  GEN APPEARANCE: WDWN, alert and cooperative, non-toxic appearing and in NAD  HEAD: Atraumatic, normocephalic   EYES: PERRLa, EOMI, vision grossly intact.   EARS: Gross hearing intact.   NOSE: No nasal discharge, no external evidence of epistaxis.   NECK: Supple  CV: RRR, S1S2, no c/r/m/g. No cyanosis or pallor. Extremities warm, well perfused. Cap refill <2 seconds. No bruits.   LUNGS: CTAB. No wheezing. No rales. No rhonchi. No diminished breath sounds.   ABDOMEN: Soft, NTND. No guarding or rebound. No masses.   MSK/EXT: Spine appears normal, no spine point tenderness. No CVA ttp. Normal muscular development. Pelvis stable. No obvious joint or bony deformity, no peripheral edema. +mild diffuse edema to RLE  NEURO: Alert, follows commands. Weight bearing normal. Speech normal. Sensation and motor normal x4 extremities.   SKIN: Normal color for race, warm, dry and intact. +right great toe with purulent discharge and edematous  PSYCH: Normal mood and affect.

## 2021-07-27 LAB
COVID-19 SPIKE DOMAIN AB INTERP: POSITIVE
COVID-19 SPIKE DOMAIN ANTIBODY RESULT: >250 U/ML — HIGH
CRP SERPL-MCNC: 65 MG/L — HIGH
CULTURE RESULTS: SIGNIFICANT CHANGE UP
HCT VFR BLD CALC: 32.4 % — LOW (ref 34.5–45)
HCV AB S/CO SERPL IA: 0.1 S/CO — SIGNIFICANT CHANGE UP (ref 0–0.99)
HCV AB SERPL-IMP: SIGNIFICANT CHANGE UP
HGB BLD-MCNC: 10.1 G/DL — LOW (ref 11.5–15.5)
MCHC RBC-ENTMCNC: 25.1 PG — LOW (ref 27–34)
MCHC RBC-ENTMCNC: 31.2 GM/DL — LOW (ref 32–36)
MCV RBC AUTO: 80.4 FL — SIGNIFICANT CHANGE UP (ref 80–100)
PLATELET # BLD AUTO: 454 K/UL — HIGH (ref 150–400)
RBC # BLD: 4.03 M/UL — SIGNIFICANT CHANGE UP (ref 3.8–5.2)
RBC # FLD: 13.9 % — SIGNIFICANT CHANGE UP (ref 10.3–14.5)
SARS-COV-2 IGG+IGM SERPL QL IA: >250 U/ML — HIGH
SARS-COV-2 IGG+IGM SERPL QL IA: POSITIVE
SPECIMEN SOURCE: SIGNIFICANT CHANGE UP
WBC # BLD: 9 K/UL — SIGNIFICANT CHANGE UP (ref 3.8–10.5)
WBC # FLD AUTO: 9 K/UL — SIGNIFICANT CHANGE UP (ref 3.8–10.5)

## 2021-07-27 PROCEDURE — 99223 1ST HOSP IP/OBS HIGH 75: CPT

## 2021-07-27 PROCEDURE — 12345: CPT | Mod: NC

## 2021-07-27 RX ORDER — ACETAMINOPHEN 500 MG
650 TABLET ORAL EVERY 6 HOURS
Refills: 0 | Status: DISCONTINUED | OUTPATIENT
Start: 2021-07-27 | End: 2021-07-28

## 2021-07-27 RX ORDER — INSULIN LISPRO 100/ML
5 VIAL (ML) SUBCUTANEOUS
Refills: 0 | Status: DISCONTINUED | OUTPATIENT
Start: 2021-07-27 | End: 2021-07-28

## 2021-07-27 RX ORDER — INSULIN GLARGINE 100 [IU]/ML
22 INJECTION, SOLUTION SUBCUTANEOUS AT BEDTIME
Refills: 0 | Status: DISCONTINUED | OUTPATIENT
Start: 2021-07-27 | End: 2021-07-28

## 2021-07-27 RX ORDER — CEFEPIME 1 G/1
2000 INJECTION, POWDER, FOR SOLUTION INTRAMUSCULAR; INTRAVENOUS EVERY 12 HOURS
Refills: 0 | Status: DISCONTINUED | OUTPATIENT
Start: 2021-07-27 | End: 2021-07-28

## 2021-07-27 RX ORDER — VANCOMYCIN HCL 1 G
1000 VIAL (EA) INTRAVENOUS EVERY 12 HOURS
Refills: 0 | Status: DISCONTINUED | OUTPATIENT
Start: 2021-07-27 | End: 2021-07-28

## 2021-07-27 RX ADMIN — Medication 650 MILLIGRAM(S): at 05:33

## 2021-07-27 RX ADMIN — CEFEPIME 100 MILLIGRAM(S): 1 INJECTION, POWDER, FOR SOLUTION INTRAMUSCULAR; INTRAVENOUS at 21:39

## 2021-07-27 RX ADMIN — SODIUM CHLORIDE 3 MILLILITER(S): 9 INJECTION INTRAMUSCULAR; INTRAVENOUS; SUBCUTANEOUS at 21:27

## 2021-07-27 RX ADMIN — Medication 25 MICROGRAM(S): at 05:32

## 2021-07-27 RX ADMIN — ENOXAPARIN SODIUM 40 MILLIGRAM(S): 100 INJECTION SUBCUTANEOUS at 11:31

## 2021-07-27 RX ADMIN — SODIUM CHLORIDE 3 MILLILITER(S): 9 INJECTION INTRAMUSCULAR; INTRAVENOUS; SUBCUTANEOUS at 17:02

## 2021-07-27 RX ADMIN — Medication 81 MILLIGRAM(S): at 11:30

## 2021-07-27 RX ADMIN — Medication 4: at 17:05

## 2021-07-27 RX ADMIN — Medication 4: at 08:13

## 2021-07-27 RX ADMIN — SODIUM CHLORIDE 3 MILLILITER(S): 9 INJECTION INTRAMUSCULAR; INTRAVENOUS; SUBCUTANEOUS at 05:31

## 2021-07-27 RX ADMIN — PIPERACILLIN AND TAZOBACTAM 25 GRAM(S): 4; .5 INJECTION, POWDER, LYOPHILIZED, FOR SOLUTION INTRAVENOUS at 05:32

## 2021-07-27 RX ADMIN — Medication 3 UNIT(S): at 17:05

## 2021-07-27 RX ADMIN — INSULIN GLARGINE 20 UNIT(S): 100 INJECTION, SOLUTION SUBCUTANEOUS at 01:42

## 2021-07-27 RX ADMIN — LISINOPRIL 10 MILLIGRAM(S): 2.5 TABLET ORAL at 11:30

## 2021-07-27 RX ADMIN — INSULIN GLARGINE 22 UNIT(S): 100 INJECTION, SOLUTION SUBCUTANEOUS at 21:39

## 2021-07-27 RX ADMIN — Medication 650 MILLIGRAM(S): at 21:25

## 2021-07-27 RX ADMIN — Medication 2: at 13:25

## 2021-07-27 RX ADMIN — ATORVASTATIN CALCIUM 80 MILLIGRAM(S): 80 TABLET, FILM COATED ORAL at 21:39

## 2021-07-27 RX ADMIN — Medication 650 MILLIGRAM(S): at 20:25

## 2021-07-27 RX ADMIN — Medication 250 MILLIGRAM(S): at 16:51

## 2021-07-27 NOTE — CONSULT NOTE ADULT - SUBJECTIVE AND OBJECTIVE BOX
Patient is a 61y old  Female who presents with a chief complaint of toe osteomyelitis (right 1st) (2021 11:42)    HPI:  62 y/o PMH DM (uncontrolled A1C > 11), MRSA with prior OM left foot and ampuitations who presents to the ED c/o pain and swelling to the Rt foot, and non healing ulcer to the Rt hallux. Pt states that she has been under continuous care of her private podiatrist Dr. Bermudez for the management of non healing ulcerative lesion to the Rt hallux. Pt reports that the ulcer has worsened over the last few days dramatically and started to drain some purulent discharge, that Dr. Bermudez has cultured and grew MRSA. Inflammatory markers elevated 4 days ago and xray plain film confirmed changes consistent with osteomyelitis. Failed a course of outpatient PO Doxycycline.  Pt reports that the RLE started to swell as well. Pt denies fever, or chills, and denies N/V/SOB at this time. Dr. Bermudez  urged the patient to come immediately to the ED,  Vanco and Cefepime received in ED.      PMH: as above  PSH: as above  Meds: per reconciliation sheet, noted below  MEDICATIONS  (STANDING):  aspirin enteric coated 81 milliGRAM(s) Oral daily  atorvastatin 80 milliGRAM(s) Oral at bedtime  cefepime   IVPB 2000 milliGRAM(s) IV Intermittent every 12 hours  dextrose 40% Gel 15 Gram(s) Oral once  dextrose 5%. 1000 milliLiter(s) (50 mL/Hr) IV Continuous <Continuous>  dextrose 50% Injectable 25 Gram(s) IV Push once  enoxaparin Injectable 40 milliGRAM(s) SubCutaneous daily  glucagon  Injectable 1 milliGRAM(s) IntraMuscular once  insulin glargine Injectable (LANTUS) 20 Unit(s) SubCutaneous at bedtime  insulin lispro (ADMELOG) corrective regimen sliding scale   SubCutaneous three times a day before meals  levothyroxine 25 MICROGram(s) Oral daily  lisinopril 10 milliGRAM(s) Oral daily  sodium chloride 0.9% lock flush 3 milliLiter(s) IV Push every 8 hours  vancomycin  IVPB 1000 milliGRAM(s) IV Intermittent every 12 hours    Allergies    sulfa drugs (Unknown)  sulfADIAZINE (Hives)    Intolerances      Social: no smoking, no alcohol, no illegal drugs; no recent travel, no exposure to TB  FAMILY HISTORY:  FH: ovarian cancer (Sibling)    FHx: diabetes mellitus (Father)       no history of premature cardiovascular disease in first degree relatives    ROS: the patient denies fever, no chills, no HA, no dizziness, no sore throat, no blurry vision, no CP, no palpitations, no SOB, no cough, no abdominal pain, no diarrhea, no N/V, no dysuria, no leg pain, no claudication, no rash, no joint aches, no rectal pain or bleeding, no night sweats    All other systems reviewed and are negative    Vital Signs Last 24 Hrs  T(C): 36.7 (2021 08:12), Max: 37.2 (2021 17:15)  T(F): 98 (2021 08:12), Max: 98.9 (2021 17:15)  HR: 89 (2021 08:12) (81 - 92)  BP: 145/66 (2021 08:12) (128/68 - 145/66)  BP(mean): 115 (2021 23:44) (79 - 115)  RR: 18 (2021 08:12) (17 - 18)  SpO2: 97% (2021 08:12) (97% - 100%)  Daily Height in cm: 165.1 (2021 17:07)    Daily     PE:    Constitutional: frail looking  HEENT: NC/AT, EOMI, PERRLA, conjunctivae clear; ears and nose atraumatic; pharynx benign  Neck: supple; thyroid not palpable  Back: no tenderness  Respiratory: respiratory effort normal; clear to auscultation  Cardiovascular: S1S2 regular, no murmurs  Abdomen: soft, not tender, not distended, positive BS; liver and spleen WNL  Genitourinary: no suprapubic tenderness  Lymphatic: no LN palpable  Musculoskeletal: no muscle tenderness, no joint swelling or tenderness  Extremities: no pedal edema  Neurological/ Psychiatric: AxOx3, Judgement and insight normal;  moving all extremities  Skin: no rashes; no palpable lesions R 1st toe ulcer     Labs: all available labs reviewed                        10.1   9.00  )-----------( [id number]      ( 2021 07:13 )             32.4     07-    136  |  103  |  15  ----------------------------<  213<H>  3.8   |  31  |  0.70    Ca    9.7      2021 18:44    TPro  7.9  /  Alb  2.9<L>  /  TBili  0.2  /  DBili  x   /  AST  14<L>  /  ALT  20  /  AlkPhos  102  07-     LIVER FUNCTIONS - ( 2021 18:44 )  Alb: 2.9 g/dL / Pro: 7.9 gm/dL / ALK PHOS: 102 U/L / ALT: 20 U/L / AST: 14 U/L / GGT: x           Urinalysis Basic - ( 2021 18:57 )    Color: Yellow / Appearance: Clear / S.010 / pH: x  Gluc: x / Ketone: Negative  / Bili: Negative / Urobili: Negative mg/dL   Blood: x / Protein: 15 mg/dL / Nitrite: Negative   Leuk Esterase: Negative / RBC: 3-5 /HPF / WBC 0-2   Sq Epi: x / Non Sq Epi: Occasional / Bacteria: Occasional          Radiology: all available radiological tests reviewed  < from: Xray Chest 1 View- PORTABLE-Urgent (Xray Chest 1 View- PORTABLE-Urgent .) (21 @ 17:54) >    EXAM:  XR CHEST PORTABLE URGENT 1V                            PROCEDURE DATE:  2021          INTERPRETATION:  Chest one view    HISTORY: Pneumonia    COMPARISON STUDY: 2018    Frontal expiratory view of the chest shows the heart to be normal in size. The lungs are clear and there is no evidence of pneumothorax nor pleural effusion. Displacement of the trachea to the right of midline is unchanged from the prior study.    IMPRESSION:  No active pulmonary disease.    Thank you for the courtesy of this referral.    --- End of Report ---      < end of copied text >  < from: Xray Toes, Right Foot (21 @ 17:53) >    EXAM:  XR FOOT COMP MIN 3 VIEWS RT                          EXAM:  XR TOE(S) MIN 2 VIEWS RT                            PROCEDURE DATE:  2021          INTERPRETATION:  RIGHT foot and RIGHT first toe    CLINICAL INFORMATION:Injury with  Pain.    TECHNIQUE: AP,lateral and oblique views RIGHT foot and RIGHT first toe.    FINDINGS:  There is diffuse RIGHT first toe soft tissue swelling. There is erosion of the plantaris surface RIGHT first distal tuft concerning for osteomyelitis. Proximal phalanx. Remaining osseous and joint structures of the RIGHT foot intact.  .    IMPRESSION:    RIGHT first distal phalanx osteomyelitis.    < end of copied text >    Advanced directives addressed: full resuscitation
62 y/o F Pt who presents to the ED c/o pain and swelling to the Rt foot, and non healing ulcer to the Rt hallux. Pt states that she has been under continuos care of her private podiatrist Dr. Bermudez for the management of non healing ulcerative lesion to the Rt hallux. Pt reports that the ulcer has worsened over the last few days dramatically and started to drain some purulent discharge, that was cultured and grew MRSA. Vascular surgery was consulted for evaluation of right foot hallux ulcer and cellulitis.     ICU Vital Signs Last 24 Hrs  T(C): 36.7 (27 Jul 2021 08:12), Max: 37.2 (26 Jul 2021 17:15)  T(F): 98 (27 Jul 2021 08:12), Max: 98.9 (26 Jul 2021 17:15)  HR: 89 (27 Jul 2021 08:12) (81 - 92)  BP: 145/66 (27 Jul 2021 08:12) (128/68 - 145/66)  BP(mean): 115 (26 Jul 2021 23:44) (79 - 115)  ABP: --  ABP(mean): --  RR: 18 (27 Jul 2021 08:12) (17 - 18)  SpO2: 97% (27 Jul 2021 08:12) (97% - 100%)    Exam:   Constitutional: AAOx3, non-focal; NAD, comfortable resting   Focused LE exam:  Vascular: Temperature gradient is warm to warm b/l, warmer to the Rt foot, palpable pulses, both PT and DP bilaterally. capillary re-fill time is about 5   Neuro: Diminished protective sensation to the foot and digits 1-5 b/l.  Derm: Noted full thickness ulcerative lesion to the dorsum of the Rt hallux, that is connected to another full thickness ulcerative lesion to the plantar aspect of the Rt hallux, with + malodorous minimal purulent drainage.                          10.1   9.00  )-----------( 454      ( 27 Jul 2021 07:13 )             32.4   07-26    136  |  103  |  15  ----------------------------<  213<H>  3.8   |  31  |  0.70    Ca    9.7      26 Jul 2021 18:44    TPro  7.9  /  Alb  2.9<L>  /  TBili  0.2  /  DBili  x   /  AST  14<L>  /  ALT  20  /  AlkPhos  102  07-26    PT/INR - ( 26 Jul 2021 22:54 )   PT: 12.1 sec;   INR: 1.05 ratio         PTT - ( 26 Jul 2021 22:54 )  PTT:32.3 sec      
Date of service: 2021  CC: Swollen Rt foot, w/ non healing ulcerative lesion    HPI: 62 y/o F Pt who presents to the ED c/o pain and swelling to the Rt foot, and non healing ulcer to the Rt hallux. Pt states that she has been under continuos care of her private podiatrist Dr. Bermudez for the management of non healing ulcerative lesion to the Rt hallux. Pt reports that the ulcer has worsened over the last few days dramatically and started to drain some purulent discharge, that Dr. Bermudez has cultured and grew MRSA. Pt reports that the RLE started to swell as well. Pt denies fever, or chills, and denies N/V/SOB at this time. Dr. Bermudez has urged the patient to come immediately to the ED, to get further evaluation and to get admitted to receive IV Abx to rapidly contain the infection before getting further complications.    REVIEW OF SYSTEMS:  CONSTITUTIONAL: No fever, chills,  weight loss, or fatigue  EYES: No eye pain, visual disturbances, or discharge  ENMT:  No difficulty hearing, tinnitus, vertigo; No sinus or throat pain  NECK: No pain or stiffness  RESPIRATORY: No cough, wheezing, or hemoptysis; No shortness of breath  CARDIOVASCULAR: No chest pain, palpitations, dizziness, or leg swelling  GASTROINTESTINAL: No abdominal or epigastric pain. No nausea, vomiting, or hematemesis; No diarrhea or constipation. No melena or hematochezia.  GENITOURINARY: No dysuria, frequency, hematuria, or incontinence  NEUROLOGICAL: No headaches, memory loss, loss of strength, numbness, or tremors  SKIN: No itching, burning, or rashes, Non healing wound to the Rt great toe.  LYMPH NODES: No enlarged glands  ENDOCRINE: No heat or cold intolerance; No hair loss  MUSCULOSKELETAL: No joint pain or swelling; No muscle, or back pain  HEME/LYMPH: No easy bruising, or bleeding gums    PAST MEDICAL & SURGICAL HISTORY:  DM (diabetes mellitus)  HTN (hypertension)  HLD (hyperlipidemia)    Toe amputation status, left  H/O ankle fusion    Allergies  sulfADIAZINE (Hives)    VITALS:  Vital Signs Last 24 Hrs  T(C): 37.2 (21 @ 17:15), Max: 37.2 (21 @ 17:15)  T(F): 98.9 (21 @ 17:15), Max: 98.9 (21 @ 17:15)  HR: 92 (21 @ 17:15) (92 - 92)  BP: 128/68 (21 @ 17:15) (128/68 - 128/68)  BP(mean): 79 (21 @ 17:15) (79 - 79)  RR: 17 (21 @ 17:15) (17 - 17)  SpO2: 97% (21 @ 17:15) (97% - 97%)    Physical Examination:  Constitutional: AAOx3, non-focal; NAD, comfortable resting   Focused LE exam:  Vascular: Temperature gradient is warm to warm b/l, warmer to the Rt foot, palpable pulses, DP/PT is 2/4 b/l, capillary re-fill time is about 5 sec to digits 1-5 b/l, except for the Rt hallux which is absent. + 2 pitting edema to the RLE extending from the dorsum of the Rt foot to the mid Rt leg.   Neuro: Diminished protective sensation to the foot and digits 1-5 b/l.  Derm: Noted full thickness ulcerative lesion to the dorsum of the Rt hallux, that is connected to another full thickness ulcerative lesion to the plantar aspect of the Rt hallux, with + malodorous minimal purulent drainage, + probing to the proximal phalanx, + undermining, + tunneling, and + clinical signs of acute infection to the Rt forefoot.  Musculoskeletal: Manual muscle testing is 5/5 in all muscle groups of the foot/ankle b/l. 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 Evidence of toe amputation to the Lt foot    LABS:                 10.6   11.82 )-----------( 379      ( 2021 18:44 )             33.7       136  |  103  |  15  ----------------------------<  213<H>  3.8   |  31  |  0.70    Ca    9.7      2021 18:44    TPro  7.9  /  Alb  2.9<L>  /  TBili  0.2  /  DBili  x   /  AST  14<L>  /  ALT  20  /  AlkPhos  102  -    Other RIGHT GREAT TOE   @ 09:59   Moderate Methicillin resistant Staphylococcus aureus  Normal skin janell isolated  --  Methicillin resistant Staphylococcus aureus    Urinalysis Basic - ( 2021 18:57 )  Color: Yellow / Appearance: Clear / S.010 / pH: x  Gluc: x / Ketone: Negative  / Bili: Negative / Urobili: Negative mg/dL   Blood: x / Protein: 15 mg/dL / Nitrite: Negative   Leuk Esterase: Negative / RBC: 3-5 /HPF / WBC 0-2   Sq Epi: x / Non Sq Epi: Occasional / Bacteria: Occasional    Sedimentation Rate, Erythrocyte: 86 mm/hr (21 @ 10:26)   Sedimentation Rate, Erythrocyte: 73 mm/hr (21 @ 18:44)   C-Reactive Protein, Serum: 125 mg/L (21 @ 10:26)    Radiology:    < from: Xray Foot AP + Lateral + Oblique, Right (21 @ 10:57) >  IMPRESSION:  Lytic changes involving the distal first phalanx compatible with osteomyelitis.  Soft tissue gas; uncertain if this is due to gas within a wound or deep within the soft tissues.  < end of copied text >

## 2021-07-27 NOTE — CONSULT NOTE ADULT - ASSESSMENT
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 Pt states that she has been under continuous care of her private podiatrist Dr. Bermudez for the management of non healing ulcerative lesion to the Rt hallux. Pt reports that the ulcer has worsened over the last few days dramatically and started to drain some purulent discharge, that Dr. Bermudez has cultured and grew MRSA. Inflammatory markers elevated 4 days ago and xray plain film confirmed changes consistent with osteomyelitis. Failed a course of outpatient PO Doxycycline.  Pt reports that the RLE started to swell as well. Pt denies fever, or chills, and denies N/V/SOB at this time. Dr. Bermudez  urged the patient to come immediately to the ED,  Vanco and Cefepime received in ED.    1. R great toe ulcer/osteomyelitis   - agree with vancomycin [id number] check trough prior to 4th dose  - change zosyn to cefepime [id number]   - fu cultures  - podiatry eval appreciated  - plan for picc line and longer term abx for 6 weeks  - f/u cbc  - tolerating abx well so far; no side effects noted  - reason for abx use and side effects reviewed with patient  - supportive care    2. other issues - care per medicine

## 2021-07-27 NOTE — CONSULT NOTE ADULT - ASSESSMENT
60 y/o F Pt who presented with  pain and swelling to the Rt foot, and non healing ulcer to the Rt hallux.    Plan:  - Continue IV abx  - Distal pulsations are intact bilaterally.  - Continue wound care per podiatry team.  - continue rest of management by primary team.  - No vascular intervention needed at this time.    Plan was discussed with Dr. Serrano

## 2021-07-28 ENCOUNTER — TRANSCRIPTION ENCOUNTER (OUTPATIENT)
Age: 61
End: 2021-07-28

## 2021-07-28 VITALS
OXYGEN SATURATION: 96 % | HEART RATE: 95 BPM | RESPIRATION RATE: 18 BRPM | TEMPERATURE: 98 F | DIASTOLIC BLOOD PRESSURE: 86 MMHG | SYSTOLIC BLOOD PRESSURE: 156 MMHG

## 2021-07-28 LAB — VANCOMYCIN TROUGH SERPL-MCNC: 10.6 UG/ML — SIGNIFICANT CHANGE UP (ref 10–20)

## 2021-07-28 PROCEDURE — 71045 X-RAY EXAM CHEST 1 VIEW: CPT | Mod: 26

## 2021-07-28 PROCEDURE — 99239 HOSP IP/OBS DSCHRG MGMT >30: CPT

## 2021-07-28 RX ORDER — INSULIN LISPRO 100/ML
0 VIAL (ML) SUBCUTANEOUS
Qty: 0 | Refills: 0 | DISCHARGE

## 2021-07-28 RX ORDER — ENOXAPARIN SODIUM 100 MG/ML
20 INJECTION SUBCUTANEOUS
Qty: 0 | Refills: 0 | DISCHARGE

## 2021-07-28 RX ORDER — VANCOMYCIN HCL 1 G
1 VIAL (EA) INTRAVENOUS
Qty: 0 | Refills: 0 | DISCHARGE
Start: 2021-07-28

## 2021-07-28 RX ORDER — CEFEPIME 1 G/1
2 INJECTION, POWDER, FOR SOLUTION INTRAMUSCULAR; INTRAVENOUS
Qty: 0 | Refills: 0 | DISCHARGE
Start: 2021-07-28

## 2021-07-28 RX ADMIN — Medication 3 UNIT(S): at 09:16

## 2021-07-28 RX ADMIN — Medication 650 MILLIGRAM(S): at 06:09

## 2021-07-28 RX ADMIN — Medication 81 MILLIGRAM(S): at 09:15

## 2021-07-28 RX ADMIN — Medication 650 MILLIGRAM(S): at 05:09

## 2021-07-28 RX ADMIN — SODIUM CHLORIDE 3 MILLILITER(S): 9 INJECTION INTRAMUSCULAR; INTRAVENOUS; SUBCUTANEOUS at 12:54

## 2021-07-28 RX ADMIN — Medication 6: at 09:16

## 2021-07-28 RX ADMIN — SODIUM CHLORIDE 3 MILLILITER(S): 9 INJECTION INTRAMUSCULAR; INTRAVENOUS; SUBCUTANEOUS at 05:05

## 2021-07-28 RX ADMIN — Medication 25 MICROGRAM(S): at 05:09

## 2021-07-28 RX ADMIN — Medication 250 MILLIGRAM(S): at 05:09

## 2021-07-28 RX ADMIN — Medication 250 MILLIGRAM(S): at 15:50

## 2021-07-28 RX ADMIN — LISINOPRIL 10 MILLIGRAM(S): 2.5 TABLET ORAL at 09:17

## 2021-07-28 RX ADMIN — CEFEPIME 100 MILLIGRAM(S): 1 INJECTION, POWDER, FOR SOLUTION INTRAMUSCULAR; INTRAVENOUS at 09:17

## 2021-07-28 RX ADMIN — ENOXAPARIN SODIUM 40 MILLIGRAM(S): 100 INJECTION SUBCUTANEOUS at 09:15

## 2021-07-28 RX ADMIN — Medication 4: at 12:54

## 2021-07-28 RX ADMIN — Medication 5 UNIT(S): at 12:54

## 2021-07-28 NOTE — ADVANCED PRACTICE NURSE CONSULT - ASSESSMENT
Pt confirmed using two identifiers (name and ). PICC placement procedure discussed with risks and benefits reviewed. All questions answered. Pt signed consent. Chart was reviewed. Pt prepped and draped in sterile fashion. Using strict aseptic technique that was maintained throughout procedure, performed hand hygiene, all team members maintained full barrier precautions, 1.5cc 1% lidocaine used subdermally. Using MST and guided by ultrasound, Navilyst PICC with PASV technology 4Fr, Lot #[id number], right brachial vein accessed. PICC trimmed to 41 cm and inserted. Brisk blood return observed and flushed with 30cc NS. Stat lock and gauze applied with dressings, end cap and Curos cap applied. All sharps accounted for and disposed of in sharps container. Pt tolerated procedure well. Minimal blood loss noted. CXR performed to confirm placement and no pneumothorax. Do Not Use Extremity band applied to right wrist. Pt provided with PICC line care information. Report given to primary RN.

## 2021-07-28 NOTE — PROGRESS NOTE ADULT - ASSESSMENT
Subjective: Hospital course reviewed.  Patient did have 1 unit prbc yesterday.  He has been hemodynamically stable.  At the time of my evaluation, the patient is awake and alert, resting comfortably in bed.  Wife present at bedside.  The patient does not appear to be in acute distress at this time.  He denies any nausea or vomiting at this time.  No headaches or blurred vision.  He was lightheaded at times prior to coming in–improved.  He denies any chest pain.  His breathing seems to be reasonably comfortable although he is on nasal cannula oxygen at this time.  He denies any abdominal pain or diarrhea.  No dysuria or hematuria.  He does note that he gets up about 3 times at night.  He does have some prostate and bladder issues. He denies any rash or joint pains.  His appetite has been somewhat low over the previous several days–may be a little bit better today.  Rest of review of systems is negative.  seen with RN    PHYSICAL EXAMINATION:  Vital Signs (last 24 hrs)_afeb, 60s, 18, 115/55_      Intake Output Balance  03/03/2019 7a-3p   975   500   475   3p-11p  1054  2125 -1071   11p-7a     0   800  -800   Totals  2029  3425 -1396    03/02/2019 7a-3p   885   625   260   3p-11p  1045   350   695   11p-7a   650   850  -200   Totals  2580  1825   755    HEENT:  No frontomaxillary sinus tenderness.  No nasal congestion.  No posterior pharyngeal wall erythema or exudate.     NECK:  Supple.  No JVP elevation.  Good carotid volume.     HEART:  S1, S2.  No gallops or rubs.     LUNGS: Clear to auscultation.  No rales or wheezes.  Good inspiratory effort.     ABDOMEN:  Soft, nontender, and nondistended.  Bowel sounds are present.  No bruits in all quadrants.  No hepatosplenomegaly.  No bladder distention per percussion.     EXTREMITIES:  Reveal no bilateral upper extremity edema.  He has trace bilateral lower extremity edema.     SKIN: Reveals no rash.     JOINTS: Reveals no active synovitis.     NEUROLOGIC: 
60 y/o PMH DM (uncontrolled A1C > 11), MRSA with prior OM left foot and ampuitations who presents to the ED c/o pain and swelling to the Rt foot, and non healing ulcer to the Rt hallux. Pt states that she has been under continuous care of her private podiatrist Dr. Bermudez for the management of non healing ulcerative lesion to the Rt hallux. Pt reports that the ulcer has worsened over the last few days dramatically and started to drain some purulent discharge, that Dr. Bermudez has cultured and grew MRSA. Inflammatory markers elevated 4 days ago and xray plain film confirmed changes consistent with osteomyelitis. Failed a course of outpatient PO Doxycycline.  Pt reports that the RLE started to swell as well. Pt denies fever, or chills, and denies N/V/SOB at this time. Dr. Bermudez  urged the patient to come immediately to the ED,  Vanco and Cefepime received in ED.    1. R great toe ulcer/osteomyelitis   - on vancomycin [id number] check trough prior to 4th dose #2-3  - on cefepime [id number]  #2  - continue with abx coverage   - blood cx no growth  - podiatry eval appreciated  - plan for picc line and longer term abx for 6 weeks vanco/invanz   - f/u cbc  - tolerating abx well so far; no side effects noted  - reason for abx use and side effects reviewed with patient  - supportive care    2. other issues - care per medicine 
Assessment: 60 y/o F Pt has been seen by podiatry team for the evaluation of;  - Rt hallux, distal phalanx osteomyelitis   - RLE cellulitis  - Rt hallux, non healing ulcerative lesion  - Pain to the Rt foot, and difficulty to ambulate.      Plan:  - Pt is evaluated and chart reviewed.  - Discussed the potential causes and plan of management with the Pt.  - Discussed all the different aspects of treatment with the Pt including conservative and potential surgical options.  - Discussed the Cx results with the patient, and explained to the patient different modalities of treatment.   - Explained to the patient that the surgical intervention in the form of amputation of the Rt hallux, is considered superior to any conservative treatment, to remove the septic focus completely and allow the ulcer to heal and to avoid the propagation of the infection to any further point.  - All patient's questions were answered to satisfaction and all pros, and cons of each Tx modality were explained in layman's terms.   - Pt demonstrated verbal understanding, and opted for the conservative Tx using long term IV Abx course via PICC line.  - Educated Pt about the importance of having a tight glycemic control, to avoid the development of any further complications.  - Marked RLE erythema was followed, to monitor the progress of the cellulitis.  - Flushed the Rt hallux wound with betadine/NS flush, and applied betadine soaked dressing to the Rt hallux.  - As Pt denies any surgical intervention at this time, and only opts for conservative Tx, podiatry plans to continue with local wound care.   - Recommend to start with Vancomycin to target the MRSA, isolated earlier in the wound Cx, until getting the IV Abx optimized by ID, appreciated.  - Recommend ID, and vascular consults.  - Care per medicine appreciated.  - Podiatry will follow the case in house.
Osteomyelitis of toe of right foot.    Plan: - podiatry consult appreciated  - local wound care per podiatry recs  - monitor CBC, vitals and temp curve  - IV Zosyn, Vanc received in ED  - ID consult. : MRSA cellulitis of right foot.  Plan: - elevate the extremity  - Antibiotics as above and ID consult.      Type 2 diabetes mellitus with other circulatory complication, with long-term current use of insulin.    Plan: - consistent carb diet,  recent A1c > 11.    - Ademolog sliding scale and premeal , continue lantus 20u HS increase to 22 units hs   - increase lantus and add nutritional insulin based on sliding scale needs over 24h.     Essential hypertension.  Plan: - stable, continue home meds.     plan for dc with PIccline for 6 weeks of IV abx in next 24 hrs   
Nonfocal.       GENERAL:  The patient is resting comfortably in bed.  Does not appear to be in acute distress.    Labs (Last four charted values)  WBC                  5.6 (MAR 04) 5.4 (MAR 03) 8.4 (MAR 02)   Hgb                  L 9.2 (MAR 04) L 9.2 (MAR 03) L 7.8 (MAR 03) L 8.6 (MAR 02)   Hct                  L 29 (MAR 04) L 28 (MAR 03) L 25 (MAR 03) L 27 (MAR 02)   Plt                  166 (MAR 04) 163 (MAR 03) 198 (MAR 02)   Na                   142 (MAR 04) 140 (MAR 03) 137 (MAR 02)   K                    4.1 (MAR 04) 4.4 (MAR 03) 4.8 (MAR 02)   CO2                  20 (MAR 04) L 16 (MAR 03) L 16 (MAR 02)   Cl                   H 114 (MAR 04) H 114 (MAR 03) H 110 (MAR 02)   Cr                   H 2.55 (MAR 04) H 2.98 (MAR 03) H 3.27 (MAR 02)   BUN                  H 45 (MAR 04) H 52 (MAR 03) H 55 (MAR 02)   Glucose              H 100 (MAR 04) H 100 (MAR 03) H 124 (MAR 02)   Mg                   2.1 (MAR 04) 2.3 (MAR 03) 2.3 (MAR 02)   Phos                 3.1 (MAR 04) 3.5 (MAR 03)   Ca                   8.4 (MAR 04) L 8.1 (MAR 03) 8.7 (MAR 02)   Troponin             C 2.43 (MAR 02) C 0.96 (MAR 02) H 0.26 (MAR 02)   Total CK             H 212 (MAR 02)      IMPRESSION:  This is an 89-year-old white male with history of hypertension, coronary artery disease, peripheral vascular disease, atrial fibrillation, gout, who is admitted with weakness with possible urinary infection.     Additionally.  1. He has acute kidney injury.  This may be secondary to volume contraction while on ARB with hypotension and anemia.  Slightly better again today  2. Hypotension may be secondary to volume contraction versus possibility of infectious process.  Better  3. Bladder dysfunction.  The patient has a history of prostatic hypertrophy and takes tamsulosin. It is unclear at this time whether this is affecting the kidney function.  Stable  4. Anemia, the patient apparently had GI bleed repeat recently.  Worsened    PLAN:  At this 
time.  1. keep off IV fluids to avoid CHF.  2. s/p transfusion   3. will give voiding trial  4. Encourage oral intake  5. Serial chemistries.  6. Avoid nephrotoxins.    7. Patient is not uremic and does not require dialysis.    8. This was discussed with the patient, family, staff at length.  All questions answered in detail.               Electronically Signed On 03.04.2019 16:33  ___________________________________________________   Joyce LOPEZ, David DOZIER    
Assessment: 62 y/o F Pt has been seen by podiatry team for the evaluation of;  - Rt hallux, distal phalanx osteomyelitis   - RLE cellulitis  - Rt hallux, non healing ulcerative lesion  - Pain to the Rt foot, and difficulty to ambulate.      Plan:  - Pt is evaluated and chart reviewed.  - Discussed the potential causes and plan of management with the Pt.  - Discussed all the different aspects of treatment with the Pt including conservative and potential surgical options.  - Discussed the Cx results with the patient, and explained to the patient different modalities of treatment.   - Explained to the patient that the surgical intervention in the form of amputation of the Rt hallux, is considered superior to any conservative treatment, to remove the septic focus completely and allow the ulcer to heal and to avoid the propagation of the infection to any further point.  - All patient's questions were answered to satisfaction and all pros, and cons of each Tx modality were explained in layman's terms.   - Pt demonstrated verbal understanding, and opted for the conservative Tx using long term IV Abx course via PICC line.  - Educated Pt about the importance of having a tight glycemic control, to avoid the development of any further complications.  - Marked RLE erythema was followed, to monitor the progress of the cellulitis, found to be receding.  - Flushed the Rt hallux wound with NS flush, and applied Iodosorb paste to the Rt hallux ulcerative lesion, then wrapped in DSD.  - ID consult, recommendations for long term IV Abx via PICC line, appreciated.  - Vascular consult, recommendations appreciated.  - Care per medicine appreciated.  - As Pt denies any surgical intervention at this time, and only opts for conservative Tx, podiatry plans to continue with local wound care.   - Pt is to follow up with Dr. Bermudez at  wound care center (Phone # [phone]) on Monday 8/2.  - Wound care instructions are outlined below.  - Prescribed Iodosorb paste to be applied to the Rt hallux wound daily.  - Podiatry will follow the case in house.    WOUND CARE INSTRUCTIONS FOR PATIENT TO BE PERFORMED DAILY:  - Please remove dressings to Right foot carefully.   - Cleanse the wound to the right great toe with normal saline moistened gauze, and dry it with sterile gauze.   - Then apply Iodosorb paste to the right great toe ulcer using a sterile applicator,   - Then dress with dry 4x4 gauze and Kerlix and secure with tape, and wrap with non compressive ACE bandage.   - Please have patient ambulating to the right heel in the provided surgical shoe at all times.  Thanks

## 2021-07-28 NOTE — DISCHARGE NOTE PROVIDER - NSDCCPCAREPLAN_GEN_ALL_CORE_FT
PRINCIPAL DISCHARGE DIAGNOSIS  Diagnosis: Osteomyelitis of toe of right foot  Assessment and Plan of Treatment: plan for total 6 weeks of IV antibiotics as prescribed by infectious disease   follow up with podiatry in 1 week  follow with PMD  in 1 week and outpatient endocrinology referal for uncontrolled diabetes A1c 11, your insulin doses have been increased      SECONDARY DISCHARGE DIAGNOSES  Diagnosis: MRSA cellulitis of right foot  Assessment and Plan of Treatment:

## 2021-07-28 NOTE — PROGRESS NOTE ADULT - SUBJECTIVE AND OBJECTIVE BOX
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 Pt states that she has been under continuous care of her private podiatrist Dr. Bermudez for the management of non healing ulcerative lesion to the Rt hallux. Pt reports that the ulcer has worsened over the last few days dramatically and started to drain some purulent discharge, that Dr. Bermudez has cultured and grew MRSA. Inflammatory markers elevated 4 days ago and xray plain film confirmed changes consistent with osteomyelitis. Failed a course of outpatient PO Doxycycline.  Pt reports that the RLE started to swell as well. Pt denies fever, or chills, and denies N/V/SOB at this time. Dr. Bermudez  urged the patient to come immediately to the ED,  Vanco and Cefepime received in ED     afebrile, no new complaints,  no events overnight ,      HEENT:   pupils equal and reactive, EOMI, no oropharyngeal lesions, erythema, exudates, oral thrush    NECK:   supple, no carotid bruits, no palpable lymph nodes, no thyromegaly    CV:  +S1, +S2, regular, no murmurs or rubs    RESP:   lungs clear to auscultation bilaterally, no wheezing, rales, rhonchi, good air entry bilaterally    BREAST:  not examined    GI:  abdomen soft, non-tender, non-distended, normal BS, no bruits, no abdominal masses, no palpable masses    RECTAL:  not examined    :  not examined    LE exam per podiatry just prior to my evaluation (see below), fresh dressing placed, not removed-   Vascular: Temperature gradient is warm to warm b/l, warmer to the Rt foot, palpable pulses, DP/PT is 2/4 b/l, capillary re-fill time is about 5 sec to digits 1-5 b/l, except for the Rt hallux which is absent. + 2 pitting edema to the RLE extending from the dorsum of the Rt foot to the mid Rt leg.   Neuro: Diminished protective sensation to the foot and digits 1-5 b/l.  Derm: Noted full thickness ulcerative lesion to the dorsum of the Rt hallux, that is connected to another full thickness ulcerative lesion to the plantar aspect of the Rt hallux, with + malodorous minimal purulent drainage, + probing to the proximal phalanx, + undermining, + tunneling, and + clinical signs of acute infection to the Rt forefoot.  Musculoskeletal: Manual muscle testing is 5/5 in all muscle groups of the foot/ankle b/l. 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 Evidence of toe amputation to the Lt footNEURO:  AAOX3, no focal neurological deficits, follows all commands, able to move extremities spontaneously      PHYSICAL EXAM:    Daily Height in cm: 165.1 (2021 17:07)    Daily     ICU Vital Signs Last 24 Hrs  T(C): 36.7 (2021 08:12), Max: 37.2 (2021 17:15)  T(F): 98 (2021 08:12), Max: 98.9 (2021 17:15)  HR: 89 (2021 08:12) (81 - 92)  BP: 145/66 (2021 08:12) (128/68 - 145/66)  BP(mean): 115 (2021 23:44) (79 - 115)  ABP: --  ABP(mean): --  RR: 18 (2021 08:12) (17 - 18)  SpO2: 97% (2021 08:12) (97% - 100%)                            10.1   9.00  )-----------( 454      ( 2021 07:13 )             32.4       CBC Full  -  ( 2021 07:13 )  WBC Count : 9.00 K/uL  RBC Count : 4.03 M/uL  Hemoglobin : 10.1 g/dL  Hematocrit : 32.4 %  Platelet Count - Automated : 454 K/uL  Mean Cell Volume : 80.4 fl  Mean Cell Hemoglobin : 25.1 pg  Mean Cell Hemoglobin Concentration : 31.2 gm/dL  Auto Neutrophil # : x  Auto Lymphocyte # : x  Auto Monocyte # : x  Auto Eosinophil # : x  Auto Basophil # : x  Auto Neutrophil % : x  Auto Lymphocyte % : x  Auto Monocyte % : x  Auto Eosinophil % : x  Auto Basophil % : x      07-26    136  |  103  |  15  ----------------------------<  213<H>  3.8   |  31  |  0.70    Ca    9.7      2021 18:44    TPro  7.9  /  Alb  2.9<L>  /  TBili  0.2  /  DBili  x   /  AST  14<L>  /  ALT  20  /  AlkPhos  102  07-26      LIVER FUNCTIONS - ( 2021 18:44 )  Alb: 2.9 g/dL / Pro: 7.9 gm/dL / ALK PHOS: 102 U/L / ALT: 20 U/L / AST: 14 U/L / GGT: x             PT/INR - ( 2021 22:54 )   PT: 12.1 sec;   INR: 1.05 ratio         PTT - ( 2021 22:54 )  PTT:32.3 sec          Urinalysis Basic - ( 2021 18:57 )    Color: Yellow / Appearance: Clear / S.010 / pH: x  Gluc: x / Ketone: Negative  / Bili: Negative / Urobili: Negative mg/dL   Blood: x / Protein: 15 mg/dL / Nitrite: Negative   Leuk Esterase: Negative / RBC: 3-5 /HPF / WBC 0-2   Sq Epi: x / Non Sq Epi: Occasional / Bacteria: Occasional            MEDICATIONS  (STANDING):  aspirin enteric coated 81 milliGRAM(s) Oral daily  atorvastatin 80 milliGRAM(s) Oral at bedtime  cefepime   IVPB 2000 milliGRAM(s) IV Intermittent every 12 hours  dextrose 40% Gel 15 Gram(s) Oral once  dextrose 5%. 1000 milliLiter(s) (50 mL/Hr) IV Continuous <Continuous>  dextrose 50% Injectable 25 Gram(s) IV Push once  enoxaparin Injectable 40 milliGRAM(s) SubCutaneous daily  glucagon  Injectable 1 milliGRAM(s) IntraMuscular once  insulin glargine Injectable (LANTUS) 20 Unit(s) SubCutaneous at bedtime  insulin lispro (ADMELOG) corrective regimen sliding scale   SubCutaneous three times a day before meals  levothyroxine 25 MICROGram(s) Oral daily  lisinopril 10 milliGRAM(s) Oral daily  sodium chloride 0.9% lock flush 3 milliLiter(s) IV Push every 8 hours  vancomycin  IVPB 1000 milliGRAM(s) IV Intermittent every 12 hours      
Date of service: 21 @ 12:50    pt seen and examined  feels better   denies foot pain  afebrile  walking around the floor    ROS: no fever or chills; denies dizziness, no HA, no SOB or cough, no abdominal pain, no diarrhea or constipation; no dysuria, no urinary frequency, no legs pain, no rashes    MEDICATIONS  (STANDING):  aspirin enteric coated 81 milliGRAM(s) Oral daily  atorvastatin 80 milliGRAM(s) Oral at bedtime  cefepime   IVPB 2000 milliGRAM(s) IV Intermittent every 12 hours  dextrose 40% Gel 15 Gram(s) Oral once  dextrose 5%. 1000 milliLiter(s) (50 mL/Hr) IV Continuous <Continuous>  dextrose 50% Injectable 25 Gram(s) IV Push once  enoxaparin Injectable 40 milliGRAM(s) SubCutaneous daily  glucagon  Injectable 1 milliGRAM(s) IntraMuscular once  insulin glargine Injectable (LANTUS) 22 Unit(s) SubCutaneous at bedtime  insulin lispro (ADMELOG) corrective regimen sliding scale   SubCutaneous three times a day before meals  insulin lispro Injectable (ADMELOG) 5 Unit(s) SubCutaneous three times a day before meals  levothyroxine 25 MICROGram(s) Oral daily  lisinopril 10 milliGRAM(s) Oral daily  sodium chloride 0.9% lock flush 3 milliLiter(s) IV Push every 8 hours  vancomycin  IVPB 1000 milliGRAM(s) IV Intermittent every 12 hours    Vital Signs Last 24 Hrs  T(C): 36.8 (2021 08:16), Max: 37.3 (2021 21:15)  T(F): 98.2 (2021 08:16), Max: 99.2 (2021 21:15)  HR: 95 (2021 08:16) (91 - 95)  BP: 156/86 (2021 08:16) (149/84 - 164/84)  BP(mean): --  RR: 18 (2021 08:16) (18 - 18)  SpO2: 96% (2021 08:16) (96% - 100%)    PE:  Constitutional: frail looking  HEENT: NC/AT, EOMI, PERRLA, conjunctivae clear; ears and nose atraumatic; pharynx benign  Neck: supple; thyroid not palpable  Back: no tenderness  Respiratory: respiratory effort normal; clear to auscultation  Cardiovascular: S1S2 regular, no murmurs  Abdomen: soft, not tender, not distended, positive BS; liver and spleen WNL  Genitourinary: no suprapubic tenderness  Lymphatic: no LN palpable  Musculoskeletal: no muscle tenderness, no joint swelling or tenderness  Extremities: no pedal edema  Neurological/ Psychiatric: AxOx3, Judgement and insight normal;  moving all extremities  Skin: no rashes; no palpable lesions R 1st toe ulcer     Labs: all available labs reviewed                                   10.   9.00  )-----------( 454      ( 2021 07:13 )             32.4         136  |  103  |  15  ----------------------------<  213<H>  3.8   |  31  |  0.70    Ca    9.7      2021 18:44    TPro  7.9  /  Alb  2.9<L>  /  TBili  0.2  /  DBili  x   /  AST  14<L>  /  ALT  20  /  AlkPhos  102          Urinalysis Basic - ( 2021 18:57 )    Color: Yellow / Appearance: Clear / S.010 / pH: x  Gluc: x / Ketone: Negative  / Bili: Negative / Urobili: Negative mg/dL   Blood: x / Protein: 15 mg/dL / Nitrite: Negative   Leuk Esterase: Negative / RBC: 3-5 /HPF / WBC 0-2   Sq Epi: x / Non Sq Epi: Occasional / Bacteria: Occasional    Culture - Urine (21 @ 18:57)   Specimen Source: .Urine None   Culture Results:   <10,000 CFU/mL Normal Urogenital Rhea Culture - Blood (21 @ 18:44)   Specimen Source: .Blood None   Culture Results:   No growth to date. Culture - Blood (21 @ 18:44)   Specimen Source: .Blood None   Culture Results:   No growth to date.       Radiology: all available radiological tests reviewed  < from: Xray Chest 1 View- PORTABLE-Urgent (Xray Chest 1 View- PORTABLE-Urgent .) (21 @ 17:54) >    EXAM:  XR CHEST PORTABLE URGENT 1V                            PROCEDURE DATE:  2021          INTERPRETATION:  Chest one view    HISTORY: Pneumonia    COMPARISON STUDY: 2018    Frontal expiratory view of the chest shows the heart to be normal in size. The lungs are clear and there is no evidence of pneumothorax nor pleural effusion. Displacement of the trachea to the right of midline is unchanged from the prior study.    IMPRESSION:  No active pulmonary disease.    Thank you for the courtesy of this referral.    --- End of Report ---      < end of copied text >  < from: Xray Toes, Right Foot (21 @ 17:53) >    EXAM:  XR FOOT COMP MIN 3 VIEWS RT                          EXAM:  XR TOE(S) MIN 2 VIEWS RT                            PROCEDURE DATE:  2021          INTERPRETATION:  RIGHT foot and RIGHT first toe    CLINICAL INFORMATION:Injury with  Pain.    TECHNIQUE: AP,lateral and oblique views RIGHT foot and RIGHT first toe.    FINDINGS:  There is diffuse RIGHT first toe soft tissue swelling. There is erosion of the plantaris surface RIGHT first distal tuft concerning for osteomyelitis. Proximal phalanx. Remaining osseous and joint structures of the RIGHT foot intact.  .    IMPRESSION:    RIGHT first distal phalanx osteomyelitis.    < end of copied text >    Advanced directives addressed: full resuscitation
Date of service: 2021  CC: Swollen Rt foot, w/ non healing ulcerative lesion    HPI: 60 y/o F Pt who presents to the ED c/o pain and swelling to the Rt foot, and non healing ulcer to the Rt hallux. Pt states that she has been under continuos care of her private podiatrist Dr. Bermudez for the management of non healing ulcerative lesion to the Rt hallux. Pt reports that the ulcer has worsened over the last few days dramatically and started to drain some purulent discharge, that Dr. Bermudez has cultured and grew MRSA. Pt reports that the RLE started to swell as well. Pt denies fever, or chills, and denies N/V/SOB at this time. Dr. Bermudez has urged the patient to come immediately to the ED, to get further evaluation and to get admitted to receive IV Abx to rapidly contain the infection before getting further complications.    : Pt has been seen by podiatry team at bedside with the attending present, for follow up on her Rt hallux ulcerative lesion, and OM. Pt states that she had no acute overnight changes, and she denies F/N/V/SOB. Pt denies any new pedal complaints.     REVIEW OF SYSTEMS:  CONSTITUTIONAL: No fever, chills,  weight loss, or fatigue  EYES: No eye pain, visual disturbances, or discharge  ENMT:  No difficulty hearing, tinnitus, vertigo; No sinus or throat pain  NECK: No pain or stiffness  RESPIRATORY: No cough, wheezing, or hemoptysis; No shortness of breath  CARDIOVASCULAR: No chest pain, palpitations, dizziness, or leg swelling  GASTROINTESTINAL: No abdominal or epigastric pain. No nausea, vomiting, or hematemesis; No diarrhea or constipation. No melena or hematochezia.  GENITOURINARY: No dysuria, frequency, hematuria, or incontinence  NEUROLOGICAL: No headaches, memory loss, loss of strength, numbness, or tremors  SKIN: No itching, burning, or rashes, Non healing wound to the Rt great toe.  LYMPH NODES: No enlarged glands  ENDOCRINE: No heat or cold intolerance; No hair loss  MUSCULOSKELETAL: No joint pain or swelling; No muscle, or back pain  HEME/LYMPH: No easy bruising, or bleeding gums    PAST MEDICAL & SURGICAL HISTORY:  DM (diabetes mellitus)  HTN (hypertension)  HLD (hyperlipidemia)    Toe amputation status, left  H/O ankle fusion    Allergies  sulfADIAZINE (Hives)    MEDICATIONS  (STANDING):  aspirin enteric coated 81 milliGRAM(s) Oral daily  atorvastatin 80 milliGRAM(s) Oral at bedtime  dextrose 40% Gel 15 Gram(s) Oral once  dextrose 5%. 1000 milliLiter(s) (50 mL/Hr) IV Continuous <Continuous>  dextrose 50% Injectable 25 Gram(s) IV Push once  enoxaparin Injectable 40 milliGRAM(s) SubCutaneous daily  glucagon  Injectable 1 milliGRAM(s) IntraMuscular once  insulin glargine Injectable (LANTUS) 20 Unit(s) SubCutaneous at bedtime  insulin lispro (ADMELOG) corrective regimen sliding scale   SubCutaneous three times a day before meals  levothyroxine 25 MICROGram(s) Oral daily  lisinopril 10 milliGRAM(s) Oral daily  piperacillin/tazobactam IVPB.. 3.375 Gram(s) IV Intermittent every 8 hours  sodium chloride 0.9% lock flush 3 milliLiter(s) IV Push every 8 hours    VITALS:  Vital Signs Last 24 Hrs  T(C): 36.7 (2021 08:12), Max: 37.2 (2021 17:15)  T(F): 98 (2021 08:12), Max: 98.9 (2021 17:15)  HR: 89 (2021 08:12) (81 - 92)  BP: 145/66 (2021 08:12) (128/68 - 145/66)  BP(mean): 115 (2021 23:44) (79 - 115)  RR: 18 (2021 08:12) (17 - 18)  SpO2: 97% (2021 08:12) (97% - 100%)    Physical Examination:  Constitutional: AAOx3, non-focal; NAD, comfortable resting   Focused LE exam:  Vascular: Temperature gradient is warm to warm b/l, warmer to the Rt foot, palpable pulses, DP/PT is 2/4 b/l, capillary re-fill time is about 5 sec to digits 1-5 b/l, except for the Rt hallux which is absent. + 2 pitting edema to the RLE extending from the dorsum of the Rt foot to the mid Rt leg.   Neuro: Diminished protective sensation to the foot and digits 1-5 b/l.  Derm: Noted full thickness ulcerative lesion to the dorsum of the Rt hallux, that is connected to another full thickness ulcerative lesion to the plantar aspect of the Rt hallux, with + malodorous minimal purulent drainage, + probing to the proximal phalanx, + undermining, + tunneling, and + clinical signs of acute infection to the Rt forefoot.  Musculoskeletal: Manual muscle testing is 5/5 in all muscle groups of the foot/ankle b/l. 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 Evidence of toe amputation to the Lt foot    LABS:                 10.6   11.82 )-----------( 379      ( 2021 18:44 )             33.7       136  |  103  |  15  ----------------------------<  213<H>  3.8   |  31  |  0.70    Ca    9.7      2021 18:44    TPro  7.9  /  Alb  2.9<L>  /  TBili  0.2  /  DBili  x   /  AST  14<L>  /  ALT  20  /  AlkPhos  102      Other RIGHT GREAT TOE   @ 09:59   Moderate Methicillin resistant Staphylococcus aureus  Normal skin janell isolated  --  Methicillin resistant Staphylococcus aureus    Urinalysis Basic - ( 2021 18:57 )  Color: Yellow / Appearance: Clear / S.010 / pH: x  Gluc: x / Ketone: Negative  / Bili: Negative / Urobili: Negative mg/dL   Blood: x / Protein: 15 mg/dL / Nitrite: Negative   Leuk Esterase: Negative / RBC: 3-5 /HPF / WBC 0-2   Sq Epi: x / Non Sq Epi: Occasional / Bacteria: Occasional    Sedimentation Rate, Erythrocyte: 86 mm/hr (21 @ 10:26)   Sedimentation Rate, Erythrocyte: 73 mm/hr (21 @ 18:44)   C-Reactive Protein, Serum: 125 mg/L (21 @ 10:26)    Radiology:    < from: Xray Foot AP + Lateral + Oblique, Right (21 @ 10:57) >  IMPRESSION:  Lytic changes involving the distal first phalanx compatible with osteomyelitis.  Soft tissue gas; uncertain if this is due to gas within a wound or deep within the soft tissues.  < end of copied text >      
Date of service: 2021  CC: Swollen Rt foot, w/ non healing ulcerative lesion    HPI: 60 y/o F Pt who presents to the ED c/o pain and swelling to the Rt foot, and non healing ulcer to the Rt hallux. Pt states that she has been under continuos care of her private podiatrist Dr. Bermudez for the management of non healing ulcerative lesion to the Rt hallux. Pt reports that the ulcer has worsened over the last few days dramatically and started to drain some purulent discharge, that Dr. Bermudez has cultured and grew MRSA. Pt reports that the RLE started to swell as well. Pt denies fever, or chills, and denies N/V/SOB at this time. Dr. Bermudez has urged the patient to come immediately to the ED, to get further evaluation and to get admitted to receive IV Abx to rapidly contain the infection before getting further complications.    : Pt has been seen by podiatry team at bedside with the attending present, for follow up on her Rt hallux ulcerative lesion, and OM. Pt states that she had no acute overnight changes, and she denies F/N/V/SOB. Pt denies any new pedal complaints. Of note; Pt had her PICC line placed, in preparation of setting up for a long course of IV Abx.    REVIEW OF SYSTEMS:  CONSTITUTIONAL: No fever, chills,  weight loss, or fatigue  EYES: No eye pain, visual disturbances, or discharge  ENMT:  No difficulty hearing, tinnitus, vertigo; No sinus or throat pain  NECK: No pain or stiffness  RESPIRATORY: No cough, wheezing, or hemoptysis; No shortness of breath  CARDIOVASCULAR: No chest pain, palpitations, dizziness, or leg swelling  GASTROINTESTINAL: No abdominal or epigastric pain. No nausea, vomiting, or hematemesis; No diarrhea or constipation. No melena or hematochezia.  GENITOURINARY: No dysuria, frequency, hematuria, or incontinence  NEUROLOGICAL: No headaches, memory loss, loss of strength, numbness, or tremors  SKIN: No itching, burning, or rashes, Non healing wound to the Rt great toe.  LYMPH NODES: No enlarged glands  ENDOCRINE: No heat or cold intolerance; No hair loss  MUSCULOSKELETAL: No joint pain or swelling; No muscle, or back pain  HEME/LYMPH: No easy bruising, or bleeding gums    PAST MEDICAL & SURGICAL HISTORY:  DM (diabetes mellitus)  HTN (hypertension)  HLD (hyperlipidemia)    Toe amputation status, left  H/O ankle fusion    Allergies  sulfADIAZINE (Hives)    MEDICATIONS  (STANDING):  aspirin enteric coated 81 milliGRAM(s) Oral daily  atorvastatin 80 milliGRAM(s) Oral at bedtime  cefepime   IVPB 2000 milliGRAM(s) IV Intermittent every 12 hours  dextrose 40% Gel 15 Gram(s) Oral once  dextrose 5%. 1000 milliLiter(s) (50 mL/Hr) IV Continuous <Continuous>  dextrose 50% Injectable 25 Gram(s) IV Push once  enoxaparin Injectable 40 milliGRAM(s) SubCutaneous daily  glucagon  Injectable 1 milliGRAM(s) IntraMuscular once  insulin glargine Injectable (LANTUS) 22 Unit(s) SubCutaneous at bedtime  insulin lispro (ADMELOG) corrective regimen sliding scale   SubCutaneous three times a day before meals  insulin lispro Injectable (ADMELOG) 5 Unit(s) SubCutaneous three times a day before meals  levothyroxine 25 MICROGram(s) Oral daily  lisinopril 10 milliGRAM(s) Oral daily  sodium chloride 0.9% lock flush 3 milliLiter(s) IV Push every 8 hours  vancomycin  IVPB 1000 milliGRAM(s) IV Intermittent every 12 hours    VITALS:  Vital Signs Last 24 Hrs  T(C): 36.8 (2021 08:16), Max: 37.3 (2021 21:15)  T(F): 98.2 (2021 08:16), Max: 99.2 (2021 21:15)  HR: 95 (2021 08:16) (91 - 95)  BP: 156/86 (2021 08:16) (149/84 - 164/84)  RR: 18 (2021 08:16) (18 - 18)  SpO2: 96% (2021 08:16) (96% - 100%)    Physical Examination:  Constitutional: AAOx3, non-focal; NAD, comfortable resting   Focused LE exam:  Vascular: Temperature gradient is warm to warm b/l, warmer to the Rt foot, palpable pulses, DP/PT is 2/4 b/l, capillary re-fill time is about 5 sec to digits 1-5 b/l, except for the Rt hallux which is absent. + 2 pitting edema to the RLE extending from the dorsum of the Rt foot to the mid Rt leg.   Neuro: Diminished protective sensation to the foot and digits 1-5 b/l.  Derm: Noted full thickness ulcerative lesion to the dorsum of the Rt hallux, that is connected to another full thickness ulcerative lesion to the plantar aspect of the Rt hallux, with + malodorous minimal purulent drainage, + probing to the proximal phalanx, + undermining, + tunneling, and + clinical signs of acute infection to the Rt forefoot.  Musculoskeletal: Manual muscle testing is 5/5 in all muscle groups of the foot/ankle b/l. 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 Evidence of toe amputation to the Lt foot    LABS:                 10.1   9.00  )-----------( 454      ( 2021 07:13 )             32.4             136  |  103  |  15  ----------------------------<  213<H>  3.8   |  31  |  0.70    Ca    9.7      2021 18:44    TPro  7.9  /  Alb  2.9<L>  /  TBili  0.2  /  DBili  x   /  AST  14<L>  /  ALT  20  /  AlkPhos  102      Other RIGHT GREAT TOE   @ 09:59   Moderate Methicillin resistant Staphylococcus aureus  Normal skin janell isolated  --  Methicillin resistant Staphylococcus aureus    Urinalysis Basic - ( 2021 18:57 )  Color: Yellow / Appearance: Clear / S.010 / pH: x  Gluc: x / Ketone: Negative  / Bili: Negative / Urobili: Negative mg/dL   Blood: x / Protein: 15 mg/dL / Nitrite: Negative   Leuk Esterase: Negative / RBC: 3-5 /HPF / WBC 0-2   Sq Epi: x / Non Sq Epi: Occasional / Bacteria: Occasional    Sedimentation Rate, Erythrocyte: 86 mm/hr (21 @ 10:26)   Sedimentation Rate, Erythrocyte: 73 mm/hr (21 @ 18:44)   C-Reactive Protein, Serum: 125 mg/L (21 @ 10:26)    Radiology:    < from: Xray Foot AP + Lateral + Oblique, Right (21 @ 10:57) >  IMPRESSION:  Lytic changes involving the distal first phalanx compatible with osteomyelitis.  Soft tissue gas; uncertain if this is due to gas within a wound or deep within the soft tissues.  < end of copied text >

## 2021-07-28 NOTE — PROGRESS NOTE ADULT - ATTENDING COMMENTS
Discussed with the case with the residents, agree with the assessment and plan, Patient refused any surgical intervention at this time, and opted only for conservative Tx using long term Abx course via PICC line. Will continue to follow and provide local wound care.
Discussed with the case with the residents, agree with the assessment and plan, Patient refused any surgical intervention at this time, and opted only for conservative Tx using long term Abx course via PICC line. Will continue to follow and provide local wound care.

## 2021-07-28 NOTE — PROGRESS NOTE ADULT - REASON FOR ADMISSION
toe osteomyelitis (right 1st)

## 2021-07-28 NOTE — DISCHARGE NOTE NURSING/CASE MANAGEMENT/SOCIAL WORK - PATIENT PORTAL LINK FT
You can access the FollowMyHealth Patient Portal offered by [facility] by registering at the following website: [email or web address]. By joining [facility]’s FollowMyHealth portal, you will also be able to view your health information using other applications (apps) compatible with our system.

## 2021-07-28 NOTE — DISCHARGE NOTE PROVIDER - PROVIDER TOKENS
FREE:[LAST:[suad],FIRST:[rajwinder],PHONE:[(   )    -],FAX:[(   )    -],ADDRESS:[podiatry]],FREE:[LAST:[primary doctor],PHONE:[(   )    -],FAX:[(   )    -]]

## 2021-07-28 NOTE — DISCHARGE NOTE PROVIDER - CARE PROVIDER_API CALL
rajwinder borjas  podiatry  Phone: (   )    -  Fax: (   )    -  Follow Up Time:     primary doctor,   Phone: (   )    -  Fax: (   )    -  Follow Up Time:

## 2021-07-28 NOTE — DISCHARGE NOTE PROVIDER - HOSPITAL COURSE
60 y/o PMH DM (uncontrolled A1C > 11), MRSA with prior OM left foot and ampuitations who presents to the ED c/o pain and swelling to the Rt foot, and non healing ulcer to the Rt hallux. Pt states that she has been under continuous care of her private podiatrist Dr. Bermudez for the management of non healing ulcerative lesion to the Rt hallux. Pt reports that the ulcer has worsened over the last few days dramatically and started to drain some purulent discharge, that Dr. Bermudez has cultured and grew MRSA. Inflammatory markers elevated 4 days ago and xray plain film confirmed changes consistent with osteomyelitis. Failed a course of outpatient PO Doxycycline.  Pt reports that the RLE started to swell as well. Pt denies fever, or chills, and denies N/V/SOB at this time. Dr. Bermudez  urged the patient to come immediately to the ED,  Vanco and Cefepime received in ED     afebrile, no new complaints,  no events overnight ,      HEENT:   pupils equal and reactive, EOMI, no oropharyngeal lesions, erythema, exudates, oral thrush    NECK:   supple, no carotid bruits, no palpable lymph nodes, no thyromegaly    CV:  +S1, +S2, regular, no murmurs or rubs    RESP:   lungs clear to auscultation bilaterally, no wheezing, rales, rhonchi, good air entry bilaterally    BREAST:  not examined    GI:  abdomen soft, non-tender, non-distended, normal BS, no bruits, no abdominal masses, no palpable masses    RECTAL:  not examined    :  not examined    LE dressing   R hallux ulcer  Musculoskeletal: Manual muscle testing is 5/5 in all muscle groups of the foot/ankle b/l. 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 Evidence of toe amputation to the Lt foot  NEURO:  AAOX3, no focal neurological deficits, follows all commands, able to move extremities spontaneously      Osteomyelitis of toe of right foot.   on vancomycin [id number] check trough prior to 4th dose #2-3  - on cefepime [id number]  #2   Per ID  plan for picc line and longer term abx for 6 weeks vanco/invanz   Podiatry recommended  surgical intervention in the form of amputation of the Rt hallux, is considered superior to any conservative treatment, to remove the septic focus completely and allow the ulcer to heal and to avoid the propagation of the infection to any further point. Pt denies any surgical intervention at this time, and only opts for conservative Tx with piccline and IV abx , podiatry plans to continue with local wound care.   - local wound care per podiatry recs  per vascular no acute surgical intervention recommended         Type 2 diabetes mellitus with other circulatory complication, with long-term current use of insulin.    Plan: - consistent carb diet,  recent A1c > 11.  increase lantus to 26 units hs  and increase pre meal to 4 u TID  follow up with endocrinology as outpatient     Essential hypertension.  Plan: - stable, continue home meds.     plan for dc with PIccline for 6 weeks of IV abx       medically stable for discharge home with home Infusion via Piccline   f/u with podiatry as outpatient     WOUND CARE INSTRUCTIONS FOR PATIENT TO BE PERFORMED DAILY:  - Please remove dressings to Right foot carefully.   - Cleanse the wound to the right great toe with normal saline moistened gauze, and dry it with sterile gauze.   - Then apply Iodosorb paste to the right great toe ulcer using a sterile applicator,   - Then dress with dry 4x4 gauze and Kerlix and secure with tape, and wrap with non compressive ACE bandage.   - Please have patient ambulating to the right heel in the provided surgical shoe at all times.

## 2021-07-28 NOTE — DISCHARGE NOTE PROVIDER - NSDCMRMEDTOKEN_GEN_ALL_CORE_FT
Afrin 0.05% nasal spray: 1 spray(s) in each nostril once a day, As Needed  Aspirin Enteric Coated 81 mg oral delayed release tablet: 1 tab(s) orally once a day  cefepime 2 g intravenous injection: 2 gram(s) intravenous every 12 hours   HumaLOG 100 units/mL injectable solution: 4 unit(s) injectable 3 times a day  Lantus Solostar Pen 100 units/mL subcutaneous solution: 26 unit(s) subcutaneously once a day (at bedtime)  levothyroxine 25 mcg (0.025 mg) oral tablet: 1 tab(s) orally once a day  Lotensin 10 mg oral tablet: 1 tab(s) orally once a day  Refresh ophthalmic solution: 1 drop(s) to each affected eye 2 times a day  rosuvastatin 20 mg oral tablet: 1 tab(s) orally once a day  vancomycin 1 g intravenous injection: 1 gram(s) intravenous every 12 hours

## 2021-08-02 ENCOUNTER — OUTPATIENT (OUTPATIENT)
Dept: OUTPATIENT SERVICES | Facility: HOSPITAL | Age: 61
LOS: 1 days | End: 2021-08-02
Payer: MEDICARE

## 2021-08-02 DIAGNOSIS — Z79.899 OTHER LONG TERM (CURRENT) DRUG THERAPY: ICD-10-CM

## 2021-08-02 DIAGNOSIS — Z98.1 ARTHRODESIS STATUS: Chronic | ICD-10-CM

## 2021-08-02 DIAGNOSIS — R01.1 CARDIAC MURMUR, UNSPECIFIED: ICD-10-CM

## 2021-08-02 DIAGNOSIS — Z86.73 PERSONAL HISTORY OF TRANSIENT ISCHEMIC ATTACK (TIA), AND CEREBRAL INFARCTION WITHOUT RESIDUAL DEFICITS: ICD-10-CM

## 2021-08-02 DIAGNOSIS — Z80.9 FAMILY HISTORY OF MALIGNANT NEOPLASM, UNSPECIFIED: ICD-10-CM

## 2021-08-02 DIAGNOSIS — Z88.2 ALLERGY STATUS TO SULFONAMIDES: ICD-10-CM

## 2021-08-02 DIAGNOSIS — Z82.49 FAMILY HISTORY OF ISCHEMIC HEART DISEASE AND OTHER DISEASES OF THE CIRCULATORY SYSTEM: ICD-10-CM

## 2021-08-02 DIAGNOSIS — Z89.422 ACQUIRED ABSENCE OF OTHER LEFT TOE(S): Chronic | ICD-10-CM

## 2021-08-02 DIAGNOSIS — Z87.891 PERSONAL HISTORY OF NICOTINE DEPENDENCE: ICD-10-CM

## 2021-08-02 DIAGNOSIS — L97.522 NON-PRESSURE CHRONIC ULCER OF OTHER PART OF LEFT FOOT WITH FAT LAYER EXPOSED: ICD-10-CM

## 2021-08-02 DIAGNOSIS — Z83.3 FAMILY HISTORY OF DIABETES MELLITUS: ICD-10-CM

## 2021-08-02 DIAGNOSIS — M86.071 ACUTE HEMATOGENOUS OSTEOMYELITIS, RIGHT ANKLE AND FOOT: ICD-10-CM

## 2021-08-02 DIAGNOSIS — J45.909 UNSPECIFIED ASTHMA, UNCOMPLICATED: ICD-10-CM

## 2021-08-02 DIAGNOSIS — L97.512 NON-PRESSURE CHRONIC ULCER OF OTHER PART OF RIGHT FOOT WITH FAT LAYER EXPOSED: ICD-10-CM

## 2021-08-02 DIAGNOSIS — R26.81 UNSTEADINESS ON FEET: ICD-10-CM

## 2021-08-02 DIAGNOSIS — E11.49 TYPE 2 DIABETES MELLITUS WITH OTHER DIABETIC NEUROLOGICAL COMPLICATION: ICD-10-CM

## 2021-08-02 DIAGNOSIS — Z84.1 FAMILY HISTORY OF DISORDERS OF KIDNEY AND URETER: ICD-10-CM

## 2021-08-02 PROCEDURE — 87077 CULTURE AEROBIC IDENTIFY: CPT

## 2021-08-02 PROCEDURE — 87070 CULTURE OTHR SPECIMN AEROBIC: CPT

## 2021-08-02 PROCEDURE — G0463: CPT

## 2021-08-02 PROCEDURE — 87186 SC STD MICRODIL/AGAR DIL: CPT

## 2021-08-03 ENCOUNTER — OUTPATIENT (OUTPATIENT)
Dept: OUTPATIENT SERVICES | Facility: HOSPITAL | Age: 61
LOS: 1 days | End: 2021-08-03
Payer: MEDICARE

## 2021-08-03 DIAGNOSIS — Z89.422 ACQUIRED ABSENCE OF OTHER LEFT TOE(S): Chronic | ICD-10-CM

## 2021-08-03 DIAGNOSIS — Z20.828 CONTACT WITH AND (SUSPECTED) EXPOSURE TO OTHER VIRAL COMMUNICABLE DISEASES: ICD-10-CM

## 2021-08-03 DIAGNOSIS — Z98.1 ARTHRODESIS STATUS: Chronic | ICD-10-CM

## 2021-08-03 PROCEDURE — U0005: CPT

## 2021-08-03 PROCEDURE — C9803: CPT

## 2021-08-03 PROCEDURE — U0003: CPT

## 2021-08-04 DIAGNOSIS — I10 ESSENTIAL (PRIMARY) HYPERTENSION: ICD-10-CM

## 2021-08-04 DIAGNOSIS — Z20.828 CONTACT WITH AND (SUSPECTED) EXPOSURE TO OTHER VIRAL COMMUNICABLE DISEASES: ICD-10-CM

## 2021-08-04 DIAGNOSIS — M86.9 OSTEOMYELITIS, UNSPECIFIED: ICD-10-CM

## 2021-08-04 DIAGNOSIS — L97.519 NON-PRESSURE CHRONIC ULCER OF OTHER PART OF RIGHT FOOT WITH UNSPECIFIED SEVERITY: ICD-10-CM

## 2021-08-04 DIAGNOSIS — L03.115 CELLULITIS OF RIGHT LOWER LIMB: ICD-10-CM

## 2021-08-04 DIAGNOSIS — Z79.4 LONG TERM (CURRENT) USE OF INSULIN: ICD-10-CM

## 2021-08-04 DIAGNOSIS — B95.62 METHICILLIN RESISTANT STAPHYLOCOCCUS AUREUS INFECTION AS THE CAUSE OF DISEASES CLASSIFIED ELSEWHERE: ICD-10-CM

## 2021-08-04 DIAGNOSIS — E11.65 TYPE 2 DIABETES MELLITUS WITH HYPERGLYCEMIA: ICD-10-CM

## 2021-08-04 DIAGNOSIS — E78.5 HYPERLIPIDEMIA, UNSPECIFIED: ICD-10-CM

## 2021-08-04 DIAGNOSIS — E11.621 TYPE 2 DIABETES MELLITUS WITH FOOT ULCER: ICD-10-CM

## 2021-08-04 DIAGNOSIS — Z20.822 CONTACT WITH AND (SUSPECTED) EXPOSURE TO COVID-19: ICD-10-CM

## 2021-08-04 DIAGNOSIS — Z89.422 ACQUIRED ABSENCE OF OTHER LEFT TOE(S): ICD-10-CM

## 2021-08-04 DIAGNOSIS — E11.69 TYPE 2 DIABETES MELLITUS WITH OTHER SPECIFIED COMPLICATION: ICD-10-CM

## 2021-08-05 ENCOUNTER — OUTPATIENT (OUTPATIENT)
Dept: OUTPATIENT SERVICES | Facility: HOSPITAL | Age: 61
LOS: 1 days | End: 2021-08-05
Payer: MEDICARE

## 2021-08-05 DIAGNOSIS — Z79.899 OTHER LONG TERM (CURRENT) DRUG THERAPY: ICD-10-CM

## 2021-08-05 DIAGNOSIS — Z98.1 ARTHRODESIS STATUS: Chronic | ICD-10-CM

## 2021-08-05 DIAGNOSIS — E11.49 TYPE 2 DIABETES MELLITUS WITH OTHER DIABETIC NEUROLOGICAL COMPLICATION: ICD-10-CM

## 2021-08-05 DIAGNOSIS — R01.1 CARDIAC MURMUR, UNSPECIFIED: ICD-10-CM

## 2021-08-05 DIAGNOSIS — L97.522 NON-PRESSURE CHRONIC ULCER OF OTHER PART OF LEFT FOOT WITH FAT LAYER EXPOSED: ICD-10-CM

## 2021-08-05 DIAGNOSIS — Z88.2 ALLERGY STATUS TO SULFONAMIDES: ICD-10-CM

## 2021-08-05 DIAGNOSIS — Z84.1 FAMILY HISTORY OF DISORDERS OF KIDNEY AND URETER: ICD-10-CM

## 2021-08-05 DIAGNOSIS — R26.81 UNSTEADINESS ON FEET: ICD-10-CM

## 2021-08-05 DIAGNOSIS — Z80.9 FAMILY HISTORY OF MALIGNANT NEOPLASM, UNSPECIFIED: ICD-10-CM

## 2021-08-05 DIAGNOSIS — Z83.3 FAMILY HISTORY OF DIABETES MELLITUS: ICD-10-CM

## 2021-08-05 DIAGNOSIS — J45.909 UNSPECIFIED ASTHMA, UNCOMPLICATED: ICD-10-CM

## 2021-08-05 DIAGNOSIS — Z82.49 FAMILY HISTORY OF ISCHEMIC HEART DISEASE AND OTHER DISEASES OF THE CIRCULATORY SYSTEM: ICD-10-CM

## 2021-08-05 DIAGNOSIS — M86.071 ACUTE HEMATOGENOUS OSTEOMYELITIS, RIGHT ANKLE AND FOOT: ICD-10-CM

## 2021-08-05 DIAGNOSIS — L97.512 NON-PRESSURE CHRONIC ULCER OF OTHER PART OF RIGHT FOOT WITH FAT LAYER EXPOSED: ICD-10-CM

## 2021-08-05 DIAGNOSIS — Z89.422 ACQUIRED ABSENCE OF OTHER LEFT TOE(S): Chronic | ICD-10-CM

## 2021-08-05 DIAGNOSIS — Z87.891 PERSONAL HISTORY OF NICOTINE DEPENDENCE: ICD-10-CM

## 2021-08-05 DIAGNOSIS — Z86.73 PERSONAL HISTORY OF TRANSIENT ISCHEMIC ATTACK (TIA), AND CEREBRAL INFARCTION WITHOUT RESIDUAL DEFICITS: ICD-10-CM

## 2021-08-05 PROCEDURE — 82962 GLUCOSE BLOOD TEST: CPT

## 2021-08-05 PROCEDURE — G0277: CPT

## 2021-08-06 ENCOUNTER — OUTPATIENT (OUTPATIENT)
Dept: OUTPATIENT SERVICES | Facility: HOSPITAL | Age: 61
LOS: 1 days | End: 2021-08-06
Payer: MEDICARE

## 2021-08-06 DIAGNOSIS — Z89.422 ACQUIRED ABSENCE OF OTHER LEFT TOE(S): Chronic | ICD-10-CM

## 2021-08-06 DIAGNOSIS — M86.071 ACUTE HEMATOGENOUS OSTEOMYELITIS, RIGHT ANKLE AND FOOT: ICD-10-CM

## 2021-08-06 DIAGNOSIS — Z88.2 ALLERGY STATUS TO SULFONAMIDES: ICD-10-CM

## 2021-08-06 DIAGNOSIS — Z98.1 ARTHRODESIS STATUS: Chronic | ICD-10-CM

## 2021-08-06 DIAGNOSIS — E11.49 TYPE 2 DIABETES MELLITUS WITH OTHER DIABETIC NEUROLOGICAL COMPLICATION: ICD-10-CM

## 2021-08-06 DIAGNOSIS — R01.1 CARDIAC MURMUR, UNSPECIFIED: ICD-10-CM

## 2021-08-06 DIAGNOSIS — L97.512 NON-PRESSURE CHRONIC ULCER OF OTHER PART OF RIGHT FOOT WITH FAT LAYER EXPOSED: ICD-10-CM

## 2021-08-06 DIAGNOSIS — Z83.3 FAMILY HISTORY OF DIABETES MELLITUS: ICD-10-CM

## 2021-08-06 DIAGNOSIS — Z84.1 FAMILY HISTORY OF DISORDERS OF KIDNEY AND URETER: ICD-10-CM

## 2021-08-06 DIAGNOSIS — L97.522 NON-PRESSURE CHRONIC ULCER OF OTHER PART OF LEFT FOOT WITH FAT LAYER EXPOSED: ICD-10-CM

## 2021-08-06 DIAGNOSIS — Z79.899 OTHER LONG TERM (CURRENT) DRUG THERAPY: ICD-10-CM

## 2021-08-06 DIAGNOSIS — Z80.9 FAMILY HISTORY OF MALIGNANT NEOPLASM, UNSPECIFIED: ICD-10-CM

## 2021-08-06 DIAGNOSIS — Z87.891 PERSONAL HISTORY OF NICOTINE DEPENDENCE: ICD-10-CM

## 2021-08-06 DIAGNOSIS — Z82.49 FAMILY HISTORY OF ISCHEMIC HEART DISEASE AND OTHER DISEASES OF THE CIRCULATORY SYSTEM: ICD-10-CM

## 2021-08-06 DIAGNOSIS — Z86.73 PERSONAL HISTORY OF TRANSIENT ISCHEMIC ATTACK (TIA), AND CEREBRAL INFARCTION WITHOUT RESIDUAL DEFICITS: ICD-10-CM

## 2021-08-06 DIAGNOSIS — R26.81 UNSTEADINESS ON FEET: ICD-10-CM

## 2021-08-06 DIAGNOSIS — J45.909 UNSPECIFIED ASTHMA, UNCOMPLICATED: ICD-10-CM

## 2021-08-06 PROCEDURE — 82962 GLUCOSE BLOOD TEST: CPT

## 2021-08-06 PROCEDURE — G0277: CPT

## 2021-08-06 PROCEDURE — 73630 X-RAY EXAM OF FOOT: CPT | Mod: 26,RT

## 2021-08-06 PROCEDURE — 73630 X-RAY EXAM OF FOOT: CPT | Mod: RT

## 2021-08-07 DIAGNOSIS — L97.522 NON-PRESSURE CHRONIC ULCER OF OTHER PART OF LEFT FOOT WITH FAT LAYER EXPOSED: ICD-10-CM

## 2021-08-09 ENCOUNTER — OUTPATIENT (OUTPATIENT)
Dept: OUTPATIENT SERVICES | Facility: HOSPITAL | Age: 61
LOS: 1 days | End: 2021-08-09
Payer: MEDICARE

## 2021-08-09 DIAGNOSIS — L97.522 NON-PRESSURE CHRONIC ULCER OF OTHER PART OF LEFT FOOT WITH FAT LAYER EXPOSED: ICD-10-CM

## 2021-08-09 DIAGNOSIS — Z89.422 ACQUIRED ABSENCE OF OTHER LEFT TOE(S): Chronic | ICD-10-CM

## 2021-08-09 DIAGNOSIS — Z98.1 ARTHRODESIS STATUS: Chronic | ICD-10-CM

## 2021-08-09 PROCEDURE — 82962 GLUCOSE BLOOD TEST: CPT

## 2021-08-09 PROCEDURE — G0277: CPT

## 2021-08-10 ENCOUNTER — OUTPATIENT (OUTPATIENT)
Dept: OUTPATIENT SERVICES | Facility: HOSPITAL | Age: 61
LOS: 1 days | End: 2021-08-10
Payer: MEDICARE

## 2021-08-10 DIAGNOSIS — Z80.9 FAMILY HISTORY OF MALIGNANT NEOPLASM, UNSPECIFIED: ICD-10-CM

## 2021-08-10 DIAGNOSIS — M86.071 ACUTE HEMATOGENOUS OSTEOMYELITIS, RIGHT ANKLE AND FOOT: ICD-10-CM

## 2021-08-10 DIAGNOSIS — Z84.1 FAMILY HISTORY OF DISORDERS OF KIDNEY AND URETER: ICD-10-CM

## 2021-08-10 DIAGNOSIS — Z83.3 FAMILY HISTORY OF DIABETES MELLITUS: ICD-10-CM

## 2021-08-10 DIAGNOSIS — Z88.2 ALLERGY STATUS TO SULFONAMIDES: ICD-10-CM

## 2021-08-10 DIAGNOSIS — R01.1 CARDIAC MURMUR, UNSPECIFIED: ICD-10-CM

## 2021-08-10 DIAGNOSIS — Z79.899 OTHER LONG TERM (CURRENT) DRUG THERAPY: ICD-10-CM

## 2021-08-10 DIAGNOSIS — L97.512 NON-PRESSURE CHRONIC ULCER OF OTHER PART OF RIGHT FOOT WITH FAT LAYER EXPOSED: ICD-10-CM

## 2021-08-10 DIAGNOSIS — R26.81 UNSTEADINESS ON FEET: ICD-10-CM

## 2021-08-10 DIAGNOSIS — Z87.891 PERSONAL HISTORY OF NICOTINE DEPENDENCE: ICD-10-CM

## 2021-08-10 DIAGNOSIS — Z82.49 FAMILY HISTORY OF ISCHEMIC HEART DISEASE AND OTHER DISEASES OF THE CIRCULATORY SYSTEM: ICD-10-CM

## 2021-08-10 DIAGNOSIS — J45.909 UNSPECIFIED ASTHMA, UNCOMPLICATED: ICD-10-CM

## 2021-08-10 DIAGNOSIS — E11.49 TYPE 2 DIABETES MELLITUS WITH OTHER DIABETIC NEUROLOGICAL COMPLICATION: ICD-10-CM

## 2021-08-10 DIAGNOSIS — Z86.73 PERSONAL HISTORY OF TRANSIENT ISCHEMIC ATTACK (TIA), AND CEREBRAL INFARCTION WITHOUT RESIDUAL DEFICITS: ICD-10-CM

## 2021-08-10 DIAGNOSIS — L97.522 NON-PRESSURE CHRONIC ULCER OF OTHER PART OF LEFT FOOT WITH FAT LAYER EXPOSED: ICD-10-CM

## 2021-08-10 DIAGNOSIS — Z98.1 ARTHRODESIS STATUS: Chronic | ICD-10-CM

## 2021-08-10 DIAGNOSIS — Z89.422 ACQUIRED ABSENCE OF OTHER LEFT TOE(S): Chronic | ICD-10-CM

## 2021-08-10 PROCEDURE — U0005: CPT

## 2021-08-10 PROCEDURE — G0463: CPT

## 2021-08-10 PROCEDURE — U0003: CPT

## 2021-08-10 PROCEDURE — 82962 GLUCOSE BLOOD TEST: CPT

## 2021-08-11 ENCOUNTER — OUTPATIENT (OUTPATIENT)
Dept: OUTPATIENT SERVICES | Facility: HOSPITAL | Age: 61
LOS: 1 days | End: 2021-08-11
Payer: MEDICARE

## 2021-08-11 DIAGNOSIS — Z84.1 FAMILY HISTORY OF DISORDERS OF KIDNEY AND URETER: ICD-10-CM

## 2021-08-11 DIAGNOSIS — Z88.2 ALLERGY STATUS TO SULFONAMIDES: ICD-10-CM

## 2021-08-11 DIAGNOSIS — Z87.891 PERSONAL HISTORY OF NICOTINE DEPENDENCE: ICD-10-CM

## 2021-08-11 DIAGNOSIS — R26.81 UNSTEADINESS ON FEET: ICD-10-CM

## 2021-08-11 DIAGNOSIS — J45.909 UNSPECIFIED ASTHMA, UNCOMPLICATED: ICD-10-CM

## 2021-08-11 DIAGNOSIS — Z82.49 FAMILY HISTORY OF ISCHEMIC HEART DISEASE AND OTHER DISEASES OF THE CIRCULATORY SYSTEM: ICD-10-CM

## 2021-08-11 DIAGNOSIS — Z86.73 PERSONAL HISTORY OF TRANSIENT ISCHEMIC ATTACK (TIA), AND CEREBRAL INFARCTION WITHOUT RESIDUAL DEFICITS: ICD-10-CM

## 2021-08-11 DIAGNOSIS — M86.071 ACUTE HEMATOGENOUS OSTEOMYELITIS, RIGHT ANKLE AND FOOT: ICD-10-CM

## 2021-08-11 DIAGNOSIS — Z98.1 ARTHRODESIS STATUS: Chronic | ICD-10-CM

## 2021-08-11 DIAGNOSIS — Z79.899 OTHER LONG TERM (CURRENT) DRUG THERAPY: ICD-10-CM

## 2021-08-11 DIAGNOSIS — Z83.3 FAMILY HISTORY OF DIABETES MELLITUS: ICD-10-CM

## 2021-08-11 DIAGNOSIS — Z80.9 FAMILY HISTORY OF MALIGNANT NEOPLASM, UNSPECIFIED: ICD-10-CM

## 2021-08-11 DIAGNOSIS — L97.512 NON-PRESSURE CHRONIC ULCER OF OTHER PART OF RIGHT FOOT WITH FAT LAYER EXPOSED: ICD-10-CM

## 2021-08-11 DIAGNOSIS — E11.49 TYPE 2 DIABETES MELLITUS WITH OTHER DIABETIC NEUROLOGICAL COMPLICATION: ICD-10-CM

## 2021-08-11 DIAGNOSIS — R01.1 CARDIAC MURMUR, UNSPECIFIED: ICD-10-CM

## 2021-08-11 DIAGNOSIS — L97.522 NON-PRESSURE CHRONIC ULCER OF OTHER PART OF LEFT FOOT WITH FAT LAYER EXPOSED: ICD-10-CM

## 2021-08-11 DIAGNOSIS — Z89.422 ACQUIRED ABSENCE OF OTHER LEFT TOE(S): Chronic | ICD-10-CM

## 2021-08-11 PROCEDURE — 82962 GLUCOSE BLOOD TEST: CPT

## 2021-08-11 PROCEDURE — G0277: CPT

## 2021-08-12 ENCOUNTER — OUTPATIENT (OUTPATIENT)
Dept: OUTPATIENT SERVICES | Facility: HOSPITAL | Age: 61
LOS: 1 days | End: 2021-08-12
Payer: MEDICARE

## 2021-08-12 DIAGNOSIS — J45.909 UNSPECIFIED ASTHMA, UNCOMPLICATED: ICD-10-CM

## 2021-08-12 DIAGNOSIS — R26.81 UNSTEADINESS ON FEET: ICD-10-CM

## 2021-08-12 DIAGNOSIS — E11.49 TYPE 2 DIABETES MELLITUS WITH OTHER DIABETIC NEUROLOGICAL COMPLICATION: ICD-10-CM

## 2021-08-12 DIAGNOSIS — Z86.73 PERSONAL HISTORY OF TRANSIENT ISCHEMIC ATTACK (TIA), AND CEREBRAL INFARCTION WITHOUT RESIDUAL DEFICITS: ICD-10-CM

## 2021-08-12 DIAGNOSIS — Z80.9 FAMILY HISTORY OF MALIGNANT NEOPLASM, UNSPECIFIED: ICD-10-CM

## 2021-08-12 DIAGNOSIS — M86.071 ACUTE HEMATOGENOUS OSTEOMYELITIS, RIGHT ANKLE AND FOOT: ICD-10-CM

## 2021-08-12 DIAGNOSIS — R01.1 CARDIAC MURMUR, UNSPECIFIED: ICD-10-CM

## 2021-08-12 DIAGNOSIS — Z88.2 ALLERGY STATUS TO SULFONAMIDES: ICD-10-CM

## 2021-08-12 DIAGNOSIS — Z84.1 FAMILY HISTORY OF DISORDERS OF KIDNEY AND URETER: ICD-10-CM

## 2021-08-12 DIAGNOSIS — Z82.49 FAMILY HISTORY OF ISCHEMIC HEART DISEASE AND OTHER DISEASES OF THE CIRCULATORY SYSTEM: ICD-10-CM

## 2021-08-12 DIAGNOSIS — Z89.422 ACQUIRED ABSENCE OF OTHER LEFT TOE(S): Chronic | ICD-10-CM

## 2021-08-12 DIAGNOSIS — Z87.891 PERSONAL HISTORY OF NICOTINE DEPENDENCE: ICD-10-CM

## 2021-08-12 DIAGNOSIS — L97.512 NON-PRESSURE CHRONIC ULCER OF OTHER PART OF RIGHT FOOT WITH FAT LAYER EXPOSED: ICD-10-CM

## 2021-08-12 DIAGNOSIS — Z79.899 OTHER LONG TERM (CURRENT) DRUG THERAPY: ICD-10-CM

## 2021-08-12 DIAGNOSIS — Z83.3 FAMILY HISTORY OF DIABETES MELLITUS: ICD-10-CM

## 2021-08-12 DIAGNOSIS — Z98.1 ARTHRODESIS STATUS: Chronic | ICD-10-CM

## 2021-08-12 DIAGNOSIS — L97.522 NON-PRESSURE CHRONIC ULCER OF OTHER PART OF LEFT FOOT WITH FAT LAYER EXPOSED: ICD-10-CM

## 2021-08-12 PROCEDURE — G0277: CPT

## 2021-08-12 PROCEDURE — 82962 GLUCOSE BLOOD TEST: CPT

## 2021-08-12 RX ORDER — ACETAMINOPHEN 500 MG
975 TABLET ORAL ONCE
Refills: 0 | Status: COMPLETED | OUTPATIENT
Start: 2021-08-13 | End: 2021-08-13

## 2021-08-12 RX ORDER — FAMOTIDINE 10 MG/ML
20 INJECTION INTRAVENOUS ONCE
Refills: 0 | Status: COMPLETED | OUTPATIENT
Start: 2021-08-13 | End: 2021-08-13

## 2021-08-13 ENCOUNTER — RESULT REVIEW (OUTPATIENT)
Age: 61
End: 2021-08-13

## 2021-08-13 ENCOUNTER — OUTPATIENT (OUTPATIENT)
Dept: INPATIENT UNIT | Facility: HOSPITAL | Age: 61
LOS: 1 days | Discharge: HOME | End: 2021-08-13
Payer: MEDICARE

## 2021-08-13 ENCOUNTER — EMERGENCY (EMERGENCY)
Facility: HOSPITAL | Age: 61
LOS: 0 days | Discharge: HOME | End: 2021-08-14
Attending: HOSPITALIST
Payer: MEDICARE

## 2021-08-13 VITALS
OXYGEN SATURATION: 99 % | SYSTOLIC BLOOD PRESSURE: 136 MMHG | DIASTOLIC BLOOD PRESSURE: 66 MMHG | RESPIRATION RATE: 18 BRPM | TEMPERATURE: 98 F | HEART RATE: 80 BPM

## 2021-08-13 VITALS
HEIGHT: 65 IN | DIASTOLIC BLOOD PRESSURE: 89 MMHG | OXYGEN SATURATION: 100 % | HEART RATE: 84 BPM | WEIGHT: 185.41 LBS | TEMPERATURE: 97 F | RESPIRATION RATE: 16 BRPM | SYSTOLIC BLOOD PRESSURE: 154 MMHG

## 2021-08-13 VITALS
SYSTOLIC BLOOD PRESSURE: 154 MMHG | RESPIRATION RATE: 18 BRPM | OXYGEN SATURATION: 100 % | TEMPERATURE: 98 F | HEART RATE: 77 BPM | DIASTOLIC BLOOD PRESSURE: 88 MMHG

## 2021-08-13 VITALS — HEIGHT: 65 IN | WEIGHT: 182.1 LBS

## 2021-08-13 DIAGNOSIS — Z79.82 LONG TERM (CURRENT) USE OF ASPIRIN: ICD-10-CM

## 2021-08-13 DIAGNOSIS — I10 ESSENTIAL (PRIMARY) HYPERTENSION: ICD-10-CM

## 2021-08-13 DIAGNOSIS — M86.071 ACUTE HEMATOGENOUS OSTEOMYELITIS, RIGHT ANKLE AND FOOT: ICD-10-CM

## 2021-08-13 DIAGNOSIS — L97.522 NON-PRESSURE CHRONIC ULCER OF OTHER PART OF LEFT FOOT WITH FAT LAYER EXPOSED: ICD-10-CM

## 2021-08-13 DIAGNOSIS — M86.171 OTHER ACUTE OSTEOMYELITIS, RIGHT ANKLE AND FOOT: ICD-10-CM

## 2021-08-13 DIAGNOSIS — Z98.1 ARTHRODESIS STATUS: Chronic | ICD-10-CM

## 2021-08-13 DIAGNOSIS — M25.571 PAIN IN RIGHT ANKLE AND JOINTS OF RIGHT FOOT: ICD-10-CM

## 2021-08-13 DIAGNOSIS — Z88.2 ALLERGY STATUS TO SULFONAMIDES: ICD-10-CM

## 2021-08-13 DIAGNOSIS — E11.9 TYPE 2 DIABETES MELLITUS WITHOUT COMPLICATIONS: ICD-10-CM

## 2021-08-13 DIAGNOSIS — E78.5 HYPERLIPIDEMIA, UNSPECIFIED: ICD-10-CM

## 2021-08-13 DIAGNOSIS — Z86.73 PERSONAL HISTORY OF TRANSIENT ISCHEMIC ATTACK (TIA), AND CEREBRAL INFARCTION WITHOUT RESIDUAL DEFICITS: ICD-10-CM

## 2021-08-13 DIAGNOSIS — Z79.4 LONG TERM (CURRENT) USE OF INSULIN: ICD-10-CM

## 2021-08-13 DIAGNOSIS — Z89.422 ACQUIRED ABSENCE OF OTHER LEFT TOE(S): Chronic | ICD-10-CM

## 2021-08-13 DIAGNOSIS — E11.69 TYPE 2 DIABETES MELLITUS WITH OTHER SPECIFIED COMPLICATION: ICD-10-CM

## 2021-08-13 DIAGNOSIS — J45.909 UNSPECIFIED ASTHMA, UNCOMPLICATED: ICD-10-CM

## 2021-08-13 DIAGNOSIS — Z98.1 ARTHRODESIS STATUS: ICD-10-CM

## 2021-08-13 DIAGNOSIS — E11.49 TYPE 2 DIABETES MELLITUS WITH OTHER DIABETIC NEUROLOGICAL COMPLICATION: ICD-10-CM

## 2021-08-13 PROCEDURE — 87116 MYCOBACTERIA CULTURE: CPT

## 2021-08-13 PROCEDURE — 87015 SPECIMEN INFECT AGNT CONCNTJ: CPT

## 2021-08-13 PROCEDURE — 88311 DECALCIFY TISSUE: CPT

## 2021-08-13 PROCEDURE — 88304 TISSUE EXAM BY PATHOLOGIST: CPT | Mod: 26

## 2021-08-13 PROCEDURE — 82962 GLUCOSE BLOOD TEST: CPT

## 2021-08-13 PROCEDURE — 88311 DECALCIFY TISSUE: CPT | Mod: 26

## 2021-08-13 PROCEDURE — 87102 FUNGUS ISOLATION CULTURE: CPT

## 2021-08-13 PROCEDURE — 88305 TISSUE EXAM BY PATHOLOGIST: CPT

## 2021-08-13 PROCEDURE — 71045 X-RAY EXAM CHEST 1 VIEW: CPT

## 2021-08-13 PROCEDURE — 88305 TISSUE EXAM BY PATHOLOGIST: CPT | Mod: 26

## 2021-08-13 PROCEDURE — 99183 HYPERBARIC OXYGEN THERAPY: CPT

## 2021-08-13 PROCEDURE — 88304 TISSUE EXAM BY PATHOLOGIST: CPT

## 2021-08-13 PROCEDURE — 87186 SC STD MICRODIL/AGAR DIL: CPT

## 2021-08-13 PROCEDURE — 73630 X-RAY EXAM OF FOOT: CPT | Mod: 26,RT

## 2021-08-13 PROCEDURE — 87206 SMEAR FLUORESCENT/ACID STAI: CPT

## 2021-08-13 PROCEDURE — 71045 X-RAY EXAM CHEST 1 VIEW: CPT | Mod: 26

## 2021-08-13 PROCEDURE — C1713: CPT

## 2021-08-13 PROCEDURE — 87070 CULTURE OTHR SPECIMN AEROBIC: CPT

## 2021-08-13 PROCEDURE — 99284 EMERGENCY DEPT VISIT MOD MDM: CPT

## 2021-08-13 PROCEDURE — 99283 EMERGENCY DEPT VISIT LOW MDM: CPT

## 2021-08-13 PROCEDURE — 87075 CULTR BACTERIA EXCEPT BLOOD: CPT

## 2021-08-13 PROCEDURE — 73630 X-RAY EXAM OF FOOT: CPT | Mod: RT

## 2021-08-13 RX ORDER — FENTANYL CITRATE 50 UG/ML
50 INJECTION INTRAVENOUS
Refills: 0 | Status: DISCONTINUED | OUTPATIENT
Start: 2021-08-13 | End: 2021-08-13

## 2021-08-13 RX ORDER — OXYCODONE HYDROCHLORIDE 5 MG/1
5 TABLET ORAL ONCE
Refills: 0 | Status: DISCONTINUED | OUTPATIENT
Start: 2021-08-13 | End: 2021-08-13

## 2021-08-13 RX ORDER — SODIUM CHLORIDE 9 MG/ML
3 INJECTION INTRAMUSCULAR; INTRAVENOUS; SUBCUTANEOUS EVERY 8 HOURS
Refills: 0 | Status: DISCONTINUED | OUTPATIENT
Start: 2021-08-13 | End: 2021-08-13

## 2021-08-13 RX ORDER — SODIUM CHLORIDE 9 MG/ML
1000 INJECTION, SOLUTION INTRAVENOUS
Refills: 0 | Status: DISCONTINUED | OUTPATIENT
Start: 2021-08-13 | End: 2021-08-13

## 2021-08-13 RX ORDER — ONDANSETRON 8 MG/1
4 TABLET, FILM COATED ORAL EVERY 6 HOURS
Refills: 0 | Status: DISCONTINUED | OUTPATIENT
Start: 2021-08-13 | End: 2021-08-13

## 2021-08-13 RX ADMIN — Medication 975 MILLIGRAM(S): at 14:30

## 2021-08-13 RX ADMIN — OXYCODONE HYDROCHLORIDE 5 MILLIGRAM(S): 5 TABLET ORAL at 18:03

## 2021-08-13 RX ADMIN — FAMOTIDINE 20 MILLIGRAM(S): 10 INJECTION INTRAVENOUS at 14:29

## 2021-08-13 RX ADMIN — OXYCODONE HYDROCHLORIDE 5 MILLIGRAM(S): 5 TABLET ORAL at 18:06

## 2021-08-13 RX ADMIN — Medication 975 MILLIGRAM(S): at 14:28

## 2021-08-13 NOTE — BRIEF OPERATIVE NOTE - NSICDXBRIEFPOSTOP_GEN_ALL_CORE_FT
POST-OP DIAGNOSIS:  Osteomyelitis of toe 13-Aug-2021 17:50:18 Osteomyelitis of Rt Great toe Nhan Verdugo

## 2021-08-13 NOTE — BRIEF OPERATIVE NOTE - NSICDXBRIEFPREOP_GEN_ALL_CORE_FT
PRE-OP DIAGNOSIS:  Osteomyelitis of toe 13-Aug-2021 17:49:55 Osteomyelitis of Rt Great toe Nhan Verdugo

## 2021-08-13 NOTE — ASU DISCHARGE PLAN (ADULT/PEDIATRIC) - ASU DC SPECIAL INSTRUCTIONSFT
Please weight bear as tolerated to right heel with surgical shoe   Keep leg elevated as often as possible   no ice   follow up with dr. borjas in a week

## 2021-08-13 NOTE — ASU DISCHARGE PLAN (ADULT/PEDIATRIC) - CARE PROVIDER_API CALL
rajwinder borjas  [address] [facility] [address]  Phone: (   )    -  Fax: (   )    -  Follow Up Time:

## 2021-08-13 NOTE — PROVIDER CONTACT NOTE (OTHER) - SITUATION
PT admitted to [facility] for Amputation of right Hallux and BGM was 257  History of diabetes and on insulin

## 2021-08-13 NOTE — ASU DISCHARGE PLAN (ADULT/PEDIATRIC) - PROVIDER TOKENS
FREE:[LAST:[suad],FIRST:[rajwinder],PHONE:[(   )    -],FAX:[(   )    -],ADDRESS:[[address] [place] [address]]]

## 2021-08-13 NOTE — ED ADULT TRIAGE NOTE - CHIEF COMPLAINT QUOTE
pt ambulatory to ED, A&O x4. presents to ED for R foot pain s/p partial R large toe amputation. states dressing on foot is too tight. able to move extremity. denies numbness.

## 2021-08-13 NOTE — BRIEF OPERATIVE NOTE - NSICDXBRIEFPROCEDURE_GEN_ALL_CORE_FT
PROCEDURES:  Ray amputation of right great toe 13-Aug-2021 17:48:05 partial amputation of Right Hallux Nhan Verdugo

## 2021-08-14 RX ORDER — OXYCODONE AND ACETAMINOPHEN 5; 325 MG/1; MG/1
1 TABLET ORAL ONCE
Refills: 0 | Status: DISCONTINUED | OUTPATIENT
Start: 2021-08-14 | End: 2021-08-14

## 2021-08-14 RX ORDER — OXYCODONE HYDROCHLORIDE 5 MG/1
1 TABLET ORAL
Qty: 12 | Refills: 0
Start: 2021-08-14 | End: 2021-08-16

## 2021-08-14 RX ADMIN — OXYCODONE AND ACETAMINOPHEN 1 TABLET(S): 5; 325 TABLET ORAL at 00:58

## 2021-08-14 NOTE — ED STATDOCS - OBJECTIVE STATEMENT
61F with hx of DM s/p right foot partial TMA, POD #0. patient left pacu today and now bandage feels very tight, removed some of it and requesting re-wrap. no drainage or fevers. also reports increased pain, requesting pain medication.

## 2021-08-14 NOTE — ED STATDOCS - NSFOLLOWUPINSTRUCTIONS_ED_ALL_ED_FT
please take medications as prescribed. caution as they may make you drowsy.  please follow up with podiatry as previously instructed

## 2021-08-14 NOTE — ED STATDOCS - PATIENT PORTAL LINK FT
You can access the FollowMyHealth Patient Portal offered by [facility] by registering at the following website: [email or web address]. By joining [facility]’s FollowMyHealth portal, you will also be able to view your health information using other applications (apps) compatible with our system.

## 2021-08-14 NOTE — ED STATDOCS - CLINICAL SUMMARY MEDICAL DECISION MAKING FREE TEXT BOX
61F s.p partial TMA of right foot, POD #0 with pain secondary to bandage and requesting pain meds post operatively. will medicate and call Podiatry to north.

## 2021-08-14 NOTE — ED STATDOCS - NSCAREINITIATED _GEN_ER
Mary Scott(Attending)
56 year old female, pmhx as documented above, hx lithotripsy for nephrolithiasis in 2019, presenting with sudden onset left sided flank pain that awoke her from sleep described as sharp, radiating to her groin and left hip, 10/10 at its worst, without palliative or provocative factors. States it feels similar to prior nephrolithiasis. Also endorses left shoulder pain, which she states is chronic and is worse with movement of the shoulder. Otherwise denies fevers, headache, vision changes, weakness/numbness, confusion, URI symptoms, neck pain, chest pain, dyspnea, cough, palpitations, nausea, vomiting, abdominal pain, diarrhea, constipation, blood in stool/dark stools, urinary symptoms, vaginal bleeding/discharge, leg swelling, rash, recent travel or sick contacts.    Vital Signs: I have reviewed the initial vital signs.  Constitutional: NAD, well-nourished, appears stated age, no acute distress.  HEENT: Airway patent, moist MM, no erythema/swelling/deformity of oral structures. EOMI, PERRLA.  CV: regular rate, regular rhythm, well-perfused extremities, 2+ b/l DP and radial pulses equal.  Lungs: BCTA, no increased WOB.  ABD: NTND, no guarding or rebound, no pulsatile mass, no hernias. (+) left CVA tenderness  MSK: Neck supple, nontender, nl ROM, no stepoff. Chest nontender. Back nontender in TLS spine or to b/l bony structures or flanks. Ext nontender, nl rom, no deformity.   INTEG: Skin warm, dry, no rash.  NEURO: A&Ox3, normal strength, nl sensation throughout, normal speech.   PSYCH: Calm, cooperative, normal affect and interaction.    Will obtain labs, CT abd/pelvis, xray of left shoulder, give IVF, pain control, re-eval.

## 2021-08-16 ENCOUNTER — OUTPATIENT (OUTPATIENT)
Dept: OUTPATIENT SERVICES | Facility: HOSPITAL | Age: 61
LOS: 1 days | End: 2021-08-16
Payer: MEDICARE

## 2021-08-16 DIAGNOSIS — Z86.73 PERSONAL HISTORY OF TRANSIENT ISCHEMIC ATTACK (TIA), AND CEREBRAL INFARCTION WITHOUT RESIDUAL DEFICITS: ICD-10-CM

## 2021-08-16 DIAGNOSIS — Z79.899 OTHER LONG TERM (CURRENT) DRUG THERAPY: ICD-10-CM

## 2021-08-16 DIAGNOSIS — Z89.422 ACQUIRED ABSENCE OF OTHER LEFT TOE(S): Chronic | ICD-10-CM

## 2021-08-16 DIAGNOSIS — L97.522 NON-PRESSURE CHRONIC ULCER OF OTHER PART OF LEFT FOOT WITH FAT LAYER EXPOSED: ICD-10-CM

## 2021-08-16 DIAGNOSIS — Z82.49 FAMILY HISTORY OF ISCHEMIC HEART DISEASE AND OTHER DISEASES OF THE CIRCULATORY SYSTEM: ICD-10-CM

## 2021-08-16 DIAGNOSIS — Z88.2 ALLERGY STATUS TO SULFONAMIDES: ICD-10-CM

## 2021-08-16 DIAGNOSIS — J45.909 UNSPECIFIED ASTHMA, UNCOMPLICATED: ICD-10-CM

## 2021-08-16 DIAGNOSIS — Z84.1 FAMILY HISTORY OF DISORDERS OF KIDNEY AND URETER: ICD-10-CM

## 2021-08-16 DIAGNOSIS — L97.512 NON-PRESSURE CHRONIC ULCER OF OTHER PART OF RIGHT FOOT WITH FAT LAYER EXPOSED: ICD-10-CM

## 2021-08-16 DIAGNOSIS — R26.81 UNSTEADINESS ON FEET: ICD-10-CM

## 2021-08-16 DIAGNOSIS — Z87.891 PERSONAL HISTORY OF NICOTINE DEPENDENCE: ICD-10-CM

## 2021-08-16 DIAGNOSIS — R01.1 CARDIAC MURMUR, UNSPECIFIED: ICD-10-CM

## 2021-08-16 DIAGNOSIS — Z98.1 ARTHRODESIS STATUS: Chronic | ICD-10-CM

## 2021-08-16 DIAGNOSIS — Z83.3 FAMILY HISTORY OF DIABETES MELLITUS: ICD-10-CM

## 2021-08-16 DIAGNOSIS — M86.071 ACUTE HEMATOGENOUS OSTEOMYELITIS, RIGHT ANKLE AND FOOT: ICD-10-CM

## 2021-08-16 DIAGNOSIS — E11.49 TYPE 2 DIABETES MELLITUS WITH OTHER DIABETIC NEUROLOGICAL COMPLICATION: ICD-10-CM

## 2021-08-16 DIAGNOSIS — Z80.9 FAMILY HISTORY OF MALIGNANT NEOPLASM, UNSPECIFIED: ICD-10-CM

## 2021-08-16 PROCEDURE — G0277: CPT

## 2021-08-16 PROCEDURE — 82962 GLUCOSE BLOOD TEST: CPT

## 2021-08-16 PROCEDURE — G0463: CPT

## 2021-08-17 ENCOUNTER — OUTPATIENT (OUTPATIENT)
Dept: OUTPATIENT SERVICES | Facility: HOSPITAL | Age: 61
LOS: 1 days | End: 2021-08-17
Payer: MEDICARE

## 2021-08-17 DIAGNOSIS — Z88.2 ALLERGY STATUS TO SULFONAMIDES: ICD-10-CM

## 2021-08-17 DIAGNOSIS — R01.1 CARDIAC MURMUR, UNSPECIFIED: ICD-10-CM

## 2021-08-17 DIAGNOSIS — L97.522 NON-PRESSURE CHRONIC ULCER OF OTHER PART OF LEFT FOOT WITH FAT LAYER EXPOSED: ICD-10-CM

## 2021-08-17 DIAGNOSIS — Z83.3 FAMILY HISTORY OF DIABETES MELLITUS: ICD-10-CM

## 2021-08-17 DIAGNOSIS — Z89.411 ACQUIRED ABSENCE OF RIGHT GREAT TOE: ICD-10-CM

## 2021-08-17 DIAGNOSIS — Z82.49 FAMILY HISTORY OF ISCHEMIC HEART DISEASE AND OTHER DISEASES OF THE CIRCULATORY SYSTEM: ICD-10-CM

## 2021-08-17 DIAGNOSIS — R26.81 UNSTEADINESS ON FEET: ICD-10-CM

## 2021-08-17 DIAGNOSIS — Z89.422 ACQUIRED ABSENCE OF OTHER LEFT TOE(S): Chronic | ICD-10-CM

## 2021-08-17 DIAGNOSIS — Z79.899 OTHER LONG TERM (CURRENT) DRUG THERAPY: ICD-10-CM

## 2021-08-17 DIAGNOSIS — Z80.9 FAMILY HISTORY OF MALIGNANT NEOPLASM, UNSPECIFIED: ICD-10-CM

## 2021-08-17 DIAGNOSIS — E11.49 TYPE 2 DIABETES MELLITUS WITH OTHER DIABETIC NEUROLOGICAL COMPLICATION: ICD-10-CM

## 2021-08-17 DIAGNOSIS — Z84.1 FAMILY HISTORY OF DISORDERS OF KIDNEY AND URETER: ICD-10-CM

## 2021-08-17 DIAGNOSIS — A49.02 METHICILLIN RESISTANT STAPHYLOCOCCUS AUREUS INFECTION, UNSPECIFIED SITE: ICD-10-CM

## 2021-08-17 DIAGNOSIS — L97.512 NON-PRESSURE CHRONIC ULCER OF OTHER PART OF RIGHT FOOT WITH FAT LAYER EXPOSED: ICD-10-CM

## 2021-08-17 DIAGNOSIS — Z87.891 PERSONAL HISTORY OF NICOTINE DEPENDENCE: ICD-10-CM

## 2021-08-17 DIAGNOSIS — Z86.73 PERSONAL HISTORY OF TRANSIENT ISCHEMIC ATTACK (TIA), AND CEREBRAL INFARCTION WITHOUT RESIDUAL DEFICITS: ICD-10-CM

## 2021-08-17 DIAGNOSIS — J45.909 UNSPECIFIED ASTHMA, UNCOMPLICATED: ICD-10-CM

## 2021-08-17 DIAGNOSIS — M86.071 ACUTE HEMATOGENOUS OSTEOMYELITIS, RIGHT ANKLE AND FOOT: ICD-10-CM

## 2021-08-17 DIAGNOSIS — Z98.1 ARTHRODESIS STATUS: Chronic | ICD-10-CM

## 2021-08-17 PROCEDURE — U0003: CPT

## 2021-08-17 PROCEDURE — G0277: CPT

## 2021-08-17 PROCEDURE — 82962 GLUCOSE BLOOD TEST: CPT

## 2021-08-17 PROCEDURE — U0005: CPT

## 2021-08-18 ENCOUNTER — OUTPATIENT (OUTPATIENT)
Dept: OUTPATIENT SERVICES | Facility: HOSPITAL | Age: 61
LOS: 1 days | End: 2021-08-18
Payer: MEDICARE

## 2021-08-18 DIAGNOSIS — Z82.49 FAMILY HISTORY OF ISCHEMIC HEART DISEASE AND OTHER DISEASES OF THE CIRCULATORY SYSTEM: ICD-10-CM

## 2021-08-18 DIAGNOSIS — L97.522 NON-PRESSURE CHRONIC ULCER OF OTHER PART OF LEFT FOOT WITH FAT LAYER EXPOSED: ICD-10-CM

## 2021-08-18 DIAGNOSIS — R01.1 CARDIAC MURMUR, UNSPECIFIED: ICD-10-CM

## 2021-08-18 DIAGNOSIS — L97.512 NON-PRESSURE CHRONIC ULCER OF OTHER PART OF RIGHT FOOT WITH FAT LAYER EXPOSED: ICD-10-CM

## 2021-08-18 DIAGNOSIS — Z84.1 FAMILY HISTORY OF DISORDERS OF KIDNEY AND URETER: ICD-10-CM

## 2021-08-18 DIAGNOSIS — Z83.3 FAMILY HISTORY OF DIABETES MELLITUS: ICD-10-CM

## 2021-08-18 DIAGNOSIS — Z87.891 PERSONAL HISTORY OF NICOTINE DEPENDENCE: ICD-10-CM

## 2021-08-18 DIAGNOSIS — E11.49 TYPE 2 DIABETES MELLITUS WITH OTHER DIABETIC NEUROLOGICAL COMPLICATION: ICD-10-CM

## 2021-08-18 DIAGNOSIS — Z79.899 OTHER LONG TERM (CURRENT) DRUG THERAPY: ICD-10-CM

## 2021-08-18 DIAGNOSIS — Z80.9 FAMILY HISTORY OF MALIGNANT NEOPLASM, UNSPECIFIED: ICD-10-CM

## 2021-08-18 DIAGNOSIS — Z86.73 PERSONAL HISTORY OF TRANSIENT ISCHEMIC ATTACK (TIA), AND CEREBRAL INFARCTION WITHOUT RESIDUAL DEFICITS: ICD-10-CM

## 2021-08-18 DIAGNOSIS — Z89.411 ACQUIRED ABSENCE OF RIGHT GREAT TOE: ICD-10-CM

## 2021-08-18 DIAGNOSIS — J45.909 UNSPECIFIED ASTHMA, UNCOMPLICATED: ICD-10-CM

## 2021-08-18 DIAGNOSIS — R26.81 UNSTEADINESS ON FEET: ICD-10-CM

## 2021-08-18 DIAGNOSIS — Z98.1 ARTHRODESIS STATUS: Chronic | ICD-10-CM

## 2021-08-18 DIAGNOSIS — A49.02 METHICILLIN RESISTANT STAPHYLOCOCCUS AUREUS INFECTION, UNSPECIFIED SITE: ICD-10-CM

## 2021-08-18 DIAGNOSIS — Z88.2 ALLERGY STATUS TO SULFONAMIDES: ICD-10-CM

## 2021-08-18 DIAGNOSIS — M86.071 ACUTE HEMATOGENOUS OSTEOMYELITIS, RIGHT ANKLE AND FOOT: ICD-10-CM

## 2021-08-18 DIAGNOSIS — Z89.422 ACQUIRED ABSENCE OF OTHER LEFT TOE(S): Chronic | ICD-10-CM

## 2021-08-18 PROCEDURE — 82962 GLUCOSE BLOOD TEST: CPT

## 2021-08-18 PROCEDURE — G0277: CPT

## 2021-08-19 ENCOUNTER — OUTPATIENT (OUTPATIENT)
Dept: OUTPATIENT SERVICES | Facility: HOSPITAL | Age: 61
LOS: 1 days | End: 2021-08-19
Payer: MEDICARE

## 2021-08-19 DIAGNOSIS — E11.49 TYPE 2 DIABETES MELLITUS WITH OTHER DIABETIC NEUROLOGICAL COMPLICATION: ICD-10-CM

## 2021-08-19 DIAGNOSIS — L97.522 NON-PRESSURE CHRONIC ULCER OF OTHER PART OF LEFT FOOT WITH FAT LAYER EXPOSED: ICD-10-CM

## 2021-08-19 DIAGNOSIS — M86.071 ACUTE HEMATOGENOUS OSTEOMYELITIS, RIGHT ANKLE AND FOOT: ICD-10-CM

## 2021-08-19 DIAGNOSIS — Z89.422 ACQUIRED ABSENCE OF OTHER LEFT TOE(S): Chronic | ICD-10-CM

## 2021-08-19 DIAGNOSIS — Z98.1 ARTHRODESIS STATUS: Chronic | ICD-10-CM

## 2021-08-19 DIAGNOSIS — L97.512 NON-PRESSURE CHRONIC ULCER OF OTHER PART OF RIGHT FOOT WITH FAT LAYER EXPOSED: ICD-10-CM

## 2021-08-19 DIAGNOSIS — R26.81 UNSTEADINESS ON FEET: ICD-10-CM

## 2021-08-19 PROCEDURE — 82962 GLUCOSE BLOOD TEST: CPT

## 2021-08-19 PROCEDURE — G0277: CPT

## 2021-08-20 ENCOUNTER — OUTPATIENT (OUTPATIENT)
Dept: OUTPATIENT SERVICES | Facility: HOSPITAL | Age: 61
LOS: 1 days | End: 2021-08-20
Payer: MEDICARE

## 2021-08-20 DIAGNOSIS — M86.071 ACUTE HEMATOGENOUS OSTEOMYELITIS, RIGHT ANKLE AND FOOT: ICD-10-CM

## 2021-08-20 DIAGNOSIS — Z83.3 FAMILY HISTORY OF DIABETES MELLITUS: ICD-10-CM

## 2021-08-20 DIAGNOSIS — Z82.49 FAMILY HISTORY OF ISCHEMIC HEART DISEASE AND OTHER DISEASES OF THE CIRCULATORY SYSTEM: ICD-10-CM

## 2021-08-20 DIAGNOSIS — Z89.411 ACQUIRED ABSENCE OF RIGHT GREAT TOE: ICD-10-CM

## 2021-08-20 DIAGNOSIS — Z98.1 ARTHRODESIS STATUS: Chronic | ICD-10-CM

## 2021-08-20 DIAGNOSIS — L97.522 NON-PRESSURE CHRONIC ULCER OF OTHER PART OF LEFT FOOT WITH FAT LAYER EXPOSED: ICD-10-CM

## 2021-08-20 DIAGNOSIS — Z79.899 OTHER LONG TERM (CURRENT) DRUG THERAPY: ICD-10-CM

## 2021-08-20 DIAGNOSIS — J45.909 UNSPECIFIED ASTHMA, UNCOMPLICATED: ICD-10-CM

## 2021-08-20 DIAGNOSIS — Z88.2 ALLERGY STATUS TO SULFONAMIDES: ICD-10-CM

## 2021-08-20 DIAGNOSIS — Z87.891 PERSONAL HISTORY OF NICOTINE DEPENDENCE: ICD-10-CM

## 2021-08-20 DIAGNOSIS — Z89.422 ACQUIRED ABSENCE OF OTHER LEFT TOE(S): Chronic | ICD-10-CM

## 2021-08-20 DIAGNOSIS — Z80.9 FAMILY HISTORY OF MALIGNANT NEOPLASM, UNSPECIFIED: ICD-10-CM

## 2021-08-20 DIAGNOSIS — A49.02 METHICILLIN RESISTANT STAPHYLOCOCCUS AUREUS INFECTION, UNSPECIFIED SITE: ICD-10-CM

## 2021-08-20 DIAGNOSIS — Z84.1 FAMILY HISTORY OF DISORDERS OF KIDNEY AND URETER: ICD-10-CM

## 2021-08-20 DIAGNOSIS — R26.81 UNSTEADINESS ON FEET: ICD-10-CM

## 2021-08-20 DIAGNOSIS — Z86.73 PERSONAL HISTORY OF TRANSIENT ISCHEMIC ATTACK (TIA), AND CEREBRAL INFARCTION WITHOUT RESIDUAL DEFICITS: ICD-10-CM

## 2021-08-20 DIAGNOSIS — R01.1 CARDIAC MURMUR, UNSPECIFIED: ICD-10-CM

## 2021-08-20 DIAGNOSIS — E11.49 TYPE 2 DIABETES MELLITUS WITH OTHER DIABETIC NEUROLOGICAL COMPLICATION: ICD-10-CM

## 2021-08-20 DIAGNOSIS — L97.512 NON-PRESSURE CHRONIC ULCER OF OTHER PART OF RIGHT FOOT WITH FAT LAYER EXPOSED: ICD-10-CM

## 2021-08-20 PROCEDURE — 82962 GLUCOSE BLOOD TEST: CPT

## 2021-08-20 PROCEDURE — G0463: CPT

## 2021-08-20 PROCEDURE — G0277: CPT

## 2021-08-23 ENCOUNTER — OUTPATIENT (OUTPATIENT)
Dept: OUTPATIENT SERVICES | Facility: HOSPITAL | Age: 61
LOS: 1 days | End: 2021-08-23
Payer: MEDICARE

## 2021-08-23 DIAGNOSIS — Z86.73 PERSONAL HISTORY OF TRANSIENT ISCHEMIC ATTACK (TIA), AND CEREBRAL INFARCTION WITHOUT RESIDUAL DEFICITS: ICD-10-CM

## 2021-08-23 DIAGNOSIS — Z89.411 ACQUIRED ABSENCE OF RIGHT GREAT TOE: ICD-10-CM

## 2021-08-23 DIAGNOSIS — E11.49 TYPE 2 DIABETES MELLITUS WITH OTHER DIABETIC NEUROLOGICAL COMPLICATION: ICD-10-CM

## 2021-08-23 DIAGNOSIS — L97.522 NON-PRESSURE CHRONIC ULCER OF OTHER PART OF LEFT FOOT WITH FAT LAYER EXPOSED: ICD-10-CM

## 2021-08-23 DIAGNOSIS — R26.81 UNSTEADINESS ON FEET: ICD-10-CM

## 2021-08-23 DIAGNOSIS — Z88.2 ALLERGY STATUS TO SULFONAMIDES: ICD-10-CM

## 2021-08-23 DIAGNOSIS — M86.371 CHRONIC MULTIFOCAL OSTEOMYELITIS, RIGHT ANKLE AND FOOT: ICD-10-CM

## 2021-08-23 DIAGNOSIS — Z80.9 FAMILY HISTORY OF MALIGNANT NEOPLASM, UNSPECIFIED: ICD-10-CM

## 2021-08-23 DIAGNOSIS — L97.512 NON-PRESSURE CHRONIC ULCER OF OTHER PART OF RIGHT FOOT WITH FAT LAYER EXPOSED: ICD-10-CM

## 2021-08-23 DIAGNOSIS — J45.909 UNSPECIFIED ASTHMA, UNCOMPLICATED: ICD-10-CM

## 2021-08-23 DIAGNOSIS — Z83.3 FAMILY HISTORY OF DIABETES MELLITUS: ICD-10-CM

## 2021-08-23 DIAGNOSIS — Z98.1 ARTHRODESIS STATUS: Chronic | ICD-10-CM

## 2021-08-23 DIAGNOSIS — Z87.891 PERSONAL HISTORY OF NICOTINE DEPENDENCE: ICD-10-CM

## 2021-08-23 DIAGNOSIS — Z82.49 FAMILY HISTORY OF ISCHEMIC HEART DISEASE AND OTHER DISEASES OF THE CIRCULATORY SYSTEM: ICD-10-CM

## 2021-08-23 DIAGNOSIS — A49.02 METHICILLIN RESISTANT STAPHYLOCOCCUS AUREUS INFECTION, UNSPECIFIED SITE: ICD-10-CM

## 2021-08-23 DIAGNOSIS — R01.1 CARDIAC MURMUR, UNSPECIFIED: ICD-10-CM

## 2021-08-23 DIAGNOSIS — Z84.1 FAMILY HISTORY OF DISORDERS OF KIDNEY AND URETER: ICD-10-CM

## 2021-08-23 DIAGNOSIS — Z89.422 ACQUIRED ABSENCE OF OTHER LEFT TOE(S): Chronic | ICD-10-CM

## 2021-08-23 DIAGNOSIS — Z79.899 OTHER LONG TERM (CURRENT) DRUG THERAPY: ICD-10-CM

## 2021-08-23 PROCEDURE — 82962 GLUCOSE BLOOD TEST: CPT

## 2021-08-23 PROCEDURE — G0277: CPT

## 2021-08-24 ENCOUNTER — OUTPATIENT (OUTPATIENT)
Dept: OUTPATIENT SERVICES | Facility: HOSPITAL | Age: 61
LOS: 1 days | End: 2021-08-24
Payer: MEDICARE

## 2021-08-24 DIAGNOSIS — Z98.1 ARTHRODESIS STATUS: Chronic | ICD-10-CM

## 2021-08-24 DIAGNOSIS — L97.522 NON-PRESSURE CHRONIC ULCER OF OTHER PART OF LEFT FOOT WITH FAT LAYER EXPOSED: ICD-10-CM

## 2021-08-24 DIAGNOSIS — Z89.422 ACQUIRED ABSENCE OF OTHER LEFT TOE(S): Chronic | ICD-10-CM

## 2021-08-24 PROCEDURE — U0003: CPT

## 2021-08-24 PROCEDURE — U0005: CPT

## 2021-08-25 ENCOUNTER — OUTPATIENT (OUTPATIENT)
Dept: OUTPATIENT SERVICES | Facility: HOSPITAL | Age: 61
LOS: 1 days | End: 2021-08-25
Payer: MEDICARE

## 2021-08-25 DIAGNOSIS — Z89.411 ACQUIRED ABSENCE OF RIGHT GREAT TOE: ICD-10-CM

## 2021-08-25 DIAGNOSIS — L97.512 NON-PRESSURE CHRONIC ULCER OF OTHER PART OF RIGHT FOOT WITH FAT LAYER EXPOSED: ICD-10-CM

## 2021-08-25 DIAGNOSIS — E11.49 TYPE 2 DIABETES MELLITUS WITH OTHER DIABETIC NEUROLOGICAL COMPLICATION: ICD-10-CM

## 2021-08-25 DIAGNOSIS — A49.02 METHICILLIN RESISTANT STAPHYLOCOCCUS AUREUS INFECTION, UNSPECIFIED SITE: ICD-10-CM

## 2021-08-25 DIAGNOSIS — Z98.1 ARTHRODESIS STATUS: Chronic | ICD-10-CM

## 2021-08-25 DIAGNOSIS — M86.071 ACUTE HEMATOGENOUS OSTEOMYELITIS, RIGHT ANKLE AND FOOT: ICD-10-CM

## 2021-08-25 DIAGNOSIS — R26.81 UNSTEADINESS ON FEET: ICD-10-CM

## 2021-08-25 DIAGNOSIS — Z89.422 ACQUIRED ABSENCE OF OTHER LEFT TOE(S): Chronic | ICD-10-CM

## 2021-08-25 DIAGNOSIS — L97.522 NON-PRESSURE CHRONIC ULCER OF OTHER PART OF LEFT FOOT WITH FAT LAYER EXPOSED: ICD-10-CM

## 2021-08-25 PROCEDURE — G0277: CPT

## 2021-08-25 PROCEDURE — 82962 GLUCOSE BLOOD TEST: CPT

## 2021-08-26 ENCOUNTER — OUTPATIENT (OUTPATIENT)
Dept: OUTPATIENT SERVICES | Facility: HOSPITAL | Age: 61
LOS: 1 days | End: 2021-08-26
Payer: MEDICARE

## 2021-08-26 DIAGNOSIS — Z89.422 ACQUIRED ABSENCE OF OTHER LEFT TOE(S): Chronic | ICD-10-CM

## 2021-08-26 DIAGNOSIS — E11.49 TYPE 2 DIABETES MELLITUS WITH OTHER DIABETIC NEUROLOGICAL COMPLICATION: ICD-10-CM

## 2021-08-26 DIAGNOSIS — Z83.3 FAMILY HISTORY OF DIABETES MELLITUS: ICD-10-CM

## 2021-08-26 DIAGNOSIS — L97.512 NON-PRESSURE CHRONIC ULCER OF OTHER PART OF RIGHT FOOT WITH FAT LAYER EXPOSED: ICD-10-CM

## 2021-08-26 DIAGNOSIS — R26.81 UNSTEADINESS ON FEET: ICD-10-CM

## 2021-08-26 DIAGNOSIS — M86.071 ACUTE HEMATOGENOUS OSTEOMYELITIS, RIGHT ANKLE AND FOOT: ICD-10-CM

## 2021-08-26 DIAGNOSIS — Z98.1 ARTHRODESIS STATUS: Chronic | ICD-10-CM

## 2021-08-26 DIAGNOSIS — L97.522 NON-PRESSURE CHRONIC ULCER OF OTHER PART OF LEFT FOOT WITH FAT LAYER EXPOSED: ICD-10-CM

## 2021-08-26 PROCEDURE — G0277: CPT

## 2021-08-26 PROCEDURE — 82962 GLUCOSE BLOOD TEST: CPT

## 2021-08-27 DIAGNOSIS — L97.522 NON-PRESSURE CHRONIC ULCER OF OTHER PART OF LEFT FOOT WITH FAT LAYER EXPOSED: ICD-10-CM

## 2021-08-31 ENCOUNTER — OUTPATIENT (OUTPATIENT)
Dept: OUTPATIENT SERVICES | Facility: HOSPITAL | Age: 61
LOS: 1 days | End: 2021-08-31
Payer: MEDICARE

## 2021-08-31 DIAGNOSIS — E11.49 TYPE 2 DIABETES MELLITUS WITH OTHER DIABETIC NEUROLOGICAL COMPLICATION: ICD-10-CM

## 2021-08-31 DIAGNOSIS — A49.02 METHICILLIN RESISTANT STAPHYLOCOCCUS AUREUS INFECTION, UNSPECIFIED SITE: ICD-10-CM

## 2021-08-31 DIAGNOSIS — M86.071 ACUTE HEMATOGENOUS OSTEOMYELITIS, RIGHT ANKLE AND FOOT: ICD-10-CM

## 2021-08-31 DIAGNOSIS — L97.522 NON-PRESSURE CHRONIC ULCER OF OTHER PART OF LEFT FOOT WITH FAT LAYER EXPOSED: ICD-10-CM

## 2021-08-31 DIAGNOSIS — L97.512 NON-PRESSURE CHRONIC ULCER OF OTHER PART OF RIGHT FOOT WITH FAT LAYER EXPOSED: ICD-10-CM

## 2021-08-31 DIAGNOSIS — Z98.1 ARTHRODESIS STATUS: Chronic | ICD-10-CM

## 2021-08-31 DIAGNOSIS — Z89.422 ACQUIRED ABSENCE OF OTHER LEFT TOE(S): Chronic | ICD-10-CM

## 2021-08-31 PROCEDURE — U0003: CPT

## 2021-08-31 PROCEDURE — 82962 GLUCOSE BLOOD TEST: CPT

## 2021-08-31 PROCEDURE — U0005: CPT

## 2021-08-31 PROCEDURE — G0277: CPT

## 2021-09-01 ENCOUNTER — OUTPATIENT (OUTPATIENT)
Dept: OUTPATIENT SERVICES | Facility: HOSPITAL | Age: 61
LOS: 1 days | End: 2021-09-01
Payer: MEDICARE

## 2021-09-01 DIAGNOSIS — Z89.422 ACQUIRED ABSENCE OF OTHER LEFT TOE(S): Chronic | ICD-10-CM

## 2021-09-01 DIAGNOSIS — E11.49 TYPE 2 DIABETES MELLITUS WITH OTHER DIABETIC NEUROLOGICAL COMPLICATION: ICD-10-CM

## 2021-09-01 DIAGNOSIS — L97.522 NON-PRESSURE CHRONIC ULCER OF OTHER PART OF LEFT FOOT WITH FAT LAYER EXPOSED: ICD-10-CM

## 2021-09-01 DIAGNOSIS — L97.512 NON-PRESSURE CHRONIC ULCER OF OTHER PART OF RIGHT FOOT WITH FAT LAYER EXPOSED: ICD-10-CM

## 2021-09-01 DIAGNOSIS — M86.071 ACUTE HEMATOGENOUS OSTEOMYELITIS, RIGHT ANKLE AND FOOT: ICD-10-CM

## 2021-09-01 DIAGNOSIS — Z98.1 ARTHRODESIS STATUS: Chronic | ICD-10-CM

## 2021-09-01 PROCEDURE — G0277: CPT

## 2021-09-01 PROCEDURE — 82962 GLUCOSE BLOOD TEST: CPT

## 2021-09-02 ENCOUNTER — OUTPATIENT (OUTPATIENT)
Dept: OUTPATIENT SERVICES | Facility: HOSPITAL | Age: 61
LOS: 1 days | End: 2021-09-02
Payer: MEDICARE

## 2021-09-02 ENCOUNTER — OUTPATIENT (OUTPATIENT)
Dept: OUTPATIENT SERVICES | Facility: HOSPITAL | Age: 61
LOS: 1 days | End: 2021-09-02

## 2021-09-02 DIAGNOSIS — E11.49 TYPE 2 DIABETES MELLITUS WITH OTHER DIABETIC NEUROLOGICAL COMPLICATION: ICD-10-CM

## 2021-09-02 DIAGNOSIS — M86.071 ACUTE HEMATOGENOUS OSTEOMYELITIS, RIGHT ANKLE AND FOOT: ICD-10-CM

## 2021-09-02 DIAGNOSIS — Z89.422 ACQUIRED ABSENCE OF OTHER LEFT TOE(S): Chronic | ICD-10-CM

## 2021-09-02 DIAGNOSIS — A49.02 METHICILLIN RESISTANT STAPHYLOCOCCUS AUREUS INFECTION, UNSPECIFIED SITE: ICD-10-CM

## 2021-09-02 DIAGNOSIS — Z98.1 ARTHRODESIS STATUS: Chronic | ICD-10-CM

## 2021-09-02 DIAGNOSIS — L97.512 NON-PRESSURE CHRONIC ULCER OF OTHER PART OF RIGHT FOOT WITH FAT LAYER EXPOSED: ICD-10-CM

## 2021-09-02 DIAGNOSIS — L97.522 NON-PRESSURE CHRONIC ULCER OF OTHER PART OF LEFT FOOT WITH FAT LAYER EXPOSED: ICD-10-CM

## 2021-09-02 PROCEDURE — G0463: CPT

## 2021-09-02 PROCEDURE — G0277: CPT

## 2021-09-02 PROCEDURE — 82962 GLUCOSE BLOOD TEST: CPT

## 2021-09-02 PROCEDURE — 73630 X-RAY EXAM OF FOOT: CPT | Mod: 26,RT

## 2021-09-02 PROCEDURE — 73630 X-RAY EXAM OF FOOT: CPT | Mod: RT

## 2021-09-03 ENCOUNTER — OUTPATIENT (OUTPATIENT)
Dept: OUTPATIENT SERVICES | Facility: HOSPITAL | Age: 61
LOS: 1 days | End: 2021-09-03
Payer: MEDICARE

## 2021-09-03 DIAGNOSIS — E11.49 TYPE 2 DIABETES MELLITUS WITH OTHER DIABETIC NEUROLOGICAL COMPLICATION: ICD-10-CM

## 2021-09-03 DIAGNOSIS — M86.071 ACUTE HEMATOGENOUS OSTEOMYELITIS, RIGHT ANKLE AND FOOT: ICD-10-CM

## 2021-09-03 DIAGNOSIS — L97.512 NON-PRESSURE CHRONIC ULCER OF OTHER PART OF RIGHT FOOT WITH FAT LAYER EXPOSED: ICD-10-CM

## 2021-09-03 DIAGNOSIS — Z89.422 ACQUIRED ABSENCE OF OTHER LEFT TOE(S): Chronic | ICD-10-CM

## 2021-09-03 DIAGNOSIS — Z98.1 ARTHRODESIS STATUS: Chronic | ICD-10-CM

## 2021-09-03 DIAGNOSIS — L97.522 NON-PRESSURE CHRONIC ULCER OF OTHER PART OF LEFT FOOT WITH FAT LAYER EXPOSED: ICD-10-CM

## 2021-09-03 PROCEDURE — G0277: CPT

## 2021-09-03 PROCEDURE — 82962 GLUCOSE BLOOD TEST: CPT

## 2021-09-07 ENCOUNTER — OUTPATIENT (OUTPATIENT)
Dept: OUTPATIENT SERVICES | Facility: HOSPITAL | Age: 61
LOS: 1 days | End: 2021-09-07
Payer: MEDICARE

## 2021-09-07 DIAGNOSIS — L97.512 NON-PRESSURE CHRONIC ULCER OF OTHER PART OF RIGHT FOOT WITH FAT LAYER EXPOSED: ICD-10-CM

## 2021-09-07 DIAGNOSIS — Z89.422 ACQUIRED ABSENCE OF OTHER LEFT TOE(S): Chronic | ICD-10-CM

## 2021-09-07 DIAGNOSIS — L97.522 NON-PRESSURE CHRONIC ULCER OF OTHER PART OF LEFT FOOT WITH FAT LAYER EXPOSED: ICD-10-CM

## 2021-09-07 DIAGNOSIS — A49.02 METHICILLIN RESISTANT STAPHYLOCOCCUS AUREUS INFECTION, UNSPECIFIED SITE: ICD-10-CM

## 2021-09-07 DIAGNOSIS — M86.071 ACUTE HEMATOGENOUS OSTEOMYELITIS, RIGHT ANKLE AND FOOT: ICD-10-CM

## 2021-09-07 DIAGNOSIS — Z98.1 ARTHRODESIS STATUS: Chronic | ICD-10-CM

## 2021-09-07 DIAGNOSIS — E11.49 TYPE 2 DIABETES MELLITUS WITH OTHER DIABETIC NEUROLOGICAL COMPLICATION: ICD-10-CM

## 2021-09-07 PROCEDURE — U0005: CPT

## 2021-09-07 PROCEDURE — G0277: CPT

## 2021-09-07 PROCEDURE — 82962 GLUCOSE BLOOD TEST: CPT

## 2021-09-07 PROCEDURE — 99183 HYPERBARIC OXYGEN THERAPY: CPT

## 2021-09-07 PROCEDURE — U0003: CPT

## 2021-09-08 ENCOUNTER — OUTPATIENT (OUTPATIENT)
Dept: OUTPATIENT SERVICES | Facility: HOSPITAL | Age: 61
LOS: 1 days | End: 2021-09-08
Payer: MEDICARE

## 2021-09-08 DIAGNOSIS — L97.512 NON-PRESSURE CHRONIC ULCER OF OTHER PART OF RIGHT FOOT WITH FAT LAYER EXPOSED: ICD-10-CM

## 2021-09-08 DIAGNOSIS — E11.49 TYPE 2 DIABETES MELLITUS WITH OTHER DIABETIC NEUROLOGICAL COMPLICATION: ICD-10-CM

## 2021-09-08 DIAGNOSIS — M86.071 ACUTE HEMATOGENOUS OSTEOMYELITIS, RIGHT ANKLE AND FOOT: ICD-10-CM

## 2021-09-08 DIAGNOSIS — Z89.422 ACQUIRED ABSENCE OF OTHER LEFT TOE(S): Chronic | ICD-10-CM

## 2021-09-08 DIAGNOSIS — Z98.1 ARTHRODESIS STATUS: Chronic | ICD-10-CM

## 2021-09-08 DIAGNOSIS — L97.522 NON-PRESSURE CHRONIC ULCER OF OTHER PART OF LEFT FOOT WITH FAT LAYER EXPOSED: ICD-10-CM

## 2021-09-08 PROCEDURE — G0277: CPT

## 2021-09-08 PROCEDURE — 82962 GLUCOSE BLOOD TEST: CPT

## 2021-09-09 ENCOUNTER — OUTPATIENT (OUTPATIENT)
Dept: OUTPATIENT SERVICES | Facility: HOSPITAL | Age: 61
LOS: 1 days | End: 2021-09-09
Payer: MEDICARE

## 2021-09-09 DIAGNOSIS — Z89.422 ACQUIRED ABSENCE OF OTHER LEFT TOE(S): Chronic | ICD-10-CM

## 2021-09-09 DIAGNOSIS — A49.02 METHICILLIN RESISTANT STAPHYLOCOCCUS AUREUS INFECTION, UNSPECIFIED SITE: ICD-10-CM

## 2021-09-09 DIAGNOSIS — L97.522 NON-PRESSURE CHRONIC ULCER OF OTHER PART OF LEFT FOOT WITH FAT LAYER EXPOSED: ICD-10-CM

## 2021-09-09 DIAGNOSIS — L97.512 NON-PRESSURE CHRONIC ULCER OF OTHER PART OF RIGHT FOOT WITH FAT LAYER EXPOSED: ICD-10-CM

## 2021-09-09 DIAGNOSIS — Z98.1 ARTHRODESIS STATUS: Chronic | ICD-10-CM

## 2021-09-09 DIAGNOSIS — E11.49 TYPE 2 DIABETES MELLITUS WITH OTHER DIABETIC NEUROLOGICAL COMPLICATION: ICD-10-CM

## 2021-09-09 DIAGNOSIS — M86.071 ACUTE HEMATOGENOUS OSTEOMYELITIS, RIGHT ANKLE AND FOOT: ICD-10-CM

## 2021-09-09 PROCEDURE — 82962 GLUCOSE BLOOD TEST: CPT

## 2021-09-09 PROCEDURE — G0277: CPT

## 2021-09-09 PROCEDURE — G0463: CPT

## 2021-09-10 ENCOUNTER — OUTPATIENT (OUTPATIENT)
Dept: OUTPATIENT SERVICES | Facility: HOSPITAL | Age: 61
LOS: 1 days | End: 2021-09-10
Payer: MEDICARE

## 2021-09-10 DIAGNOSIS — Z98.1 ARTHRODESIS STATUS: Chronic | ICD-10-CM

## 2021-09-10 DIAGNOSIS — L97.512 NON-PRESSURE CHRONIC ULCER OF OTHER PART OF RIGHT FOOT WITH FAT LAYER EXPOSED: ICD-10-CM

## 2021-09-10 DIAGNOSIS — E11.49 TYPE 2 DIABETES MELLITUS WITH OTHER DIABETIC NEUROLOGICAL COMPLICATION: ICD-10-CM

## 2021-09-10 DIAGNOSIS — Z89.422 ACQUIRED ABSENCE OF OTHER LEFT TOE(S): Chronic | ICD-10-CM

## 2021-09-10 DIAGNOSIS — M86.071 ACUTE HEMATOGENOUS OSTEOMYELITIS, RIGHT ANKLE AND FOOT: ICD-10-CM

## 2021-09-10 DIAGNOSIS — L97.522 NON-PRESSURE CHRONIC ULCER OF OTHER PART OF LEFT FOOT WITH FAT LAYER EXPOSED: ICD-10-CM

## 2021-09-10 PROCEDURE — G0277: CPT

## 2021-09-10 PROCEDURE — 82962 GLUCOSE BLOOD TEST: CPT

## 2021-09-13 ENCOUNTER — OUTPATIENT (OUTPATIENT)
Dept: OUTPATIENT SERVICES | Facility: HOSPITAL | Age: 61
LOS: 1 days | End: 2021-09-13
Payer: MEDICARE

## 2021-09-13 DIAGNOSIS — L97.522 NON-PRESSURE CHRONIC ULCER OF OTHER PART OF LEFT FOOT WITH FAT LAYER EXPOSED: ICD-10-CM

## 2021-09-13 DIAGNOSIS — Z89.422 ACQUIRED ABSENCE OF OTHER LEFT TOE(S): Chronic | ICD-10-CM

## 2021-09-13 DIAGNOSIS — L97.512 NON-PRESSURE CHRONIC ULCER OF OTHER PART OF RIGHT FOOT WITH FAT LAYER EXPOSED: ICD-10-CM

## 2021-09-13 DIAGNOSIS — Z98.1 ARTHRODESIS STATUS: Chronic | ICD-10-CM

## 2021-09-13 DIAGNOSIS — E11.49 TYPE 2 DIABETES MELLITUS WITH OTHER DIABETIC NEUROLOGICAL COMPLICATION: ICD-10-CM

## 2021-09-13 DIAGNOSIS — M86.071 ACUTE HEMATOGENOUS OSTEOMYELITIS, RIGHT ANKLE AND FOOT: ICD-10-CM

## 2021-09-13 PROCEDURE — G0277: CPT

## 2021-09-13 PROCEDURE — 82962 GLUCOSE BLOOD TEST: CPT

## 2021-09-13 PROCEDURE — G0463: CPT

## 2021-09-13 PROCEDURE — 99183 HYPERBARIC OXYGEN THERAPY: CPT

## 2021-09-14 DIAGNOSIS — L97.522 NON-PRESSURE CHRONIC ULCER OF OTHER PART OF LEFT FOOT WITH FAT LAYER EXPOSED: ICD-10-CM

## 2021-09-15 ENCOUNTER — OUTPATIENT (OUTPATIENT)
Dept: OUTPATIENT SERVICES | Facility: HOSPITAL | Age: 61
LOS: 1 days | End: 2021-09-15
Payer: MEDICARE

## 2021-09-15 DIAGNOSIS — R26.81 UNSTEADINESS ON FEET: ICD-10-CM

## 2021-09-15 DIAGNOSIS — M86.071 ACUTE HEMATOGENOUS OSTEOMYELITIS, RIGHT ANKLE AND FOOT: ICD-10-CM

## 2021-09-15 DIAGNOSIS — E11.49 TYPE 2 DIABETES MELLITUS WITH OTHER DIABETIC NEUROLOGICAL COMPLICATION: ICD-10-CM

## 2021-09-15 DIAGNOSIS — L97.512 NON-PRESSURE CHRONIC ULCER OF OTHER PART OF RIGHT FOOT WITH FAT LAYER EXPOSED: ICD-10-CM

## 2021-09-15 DIAGNOSIS — Z98.1 ARTHRODESIS STATUS: Chronic | ICD-10-CM

## 2021-09-15 DIAGNOSIS — Z89.422 ACQUIRED ABSENCE OF OTHER LEFT TOE(S): Chronic | ICD-10-CM

## 2021-09-15 DIAGNOSIS — L97.522 NON-PRESSURE CHRONIC ULCER OF OTHER PART OF LEFT FOOT WITH FAT LAYER EXPOSED: ICD-10-CM

## 2021-09-15 PROCEDURE — U0005: CPT

## 2021-09-15 PROCEDURE — 82962 GLUCOSE BLOOD TEST: CPT

## 2021-09-15 PROCEDURE — G0277: CPT

## 2021-09-15 PROCEDURE — U0003: CPT

## 2021-09-16 ENCOUNTER — OUTPATIENT (OUTPATIENT)
Dept: OUTPATIENT SERVICES | Facility: HOSPITAL | Age: 61
LOS: 1 days | End: 2021-09-16
Payer: MEDICARE

## 2021-09-16 DIAGNOSIS — Z98.1 ARTHRODESIS STATUS: Chronic | ICD-10-CM

## 2021-09-16 DIAGNOSIS — L97.522 NON-PRESSURE CHRONIC ULCER OF OTHER PART OF LEFT FOOT WITH FAT LAYER EXPOSED: ICD-10-CM

## 2021-09-16 DIAGNOSIS — Z89.422 ACQUIRED ABSENCE OF OTHER LEFT TOE(S): Chronic | ICD-10-CM

## 2021-09-16 PROCEDURE — 82962 GLUCOSE BLOOD TEST: CPT

## 2021-09-17 ENCOUNTER — OUTPATIENT (OUTPATIENT)
Dept: OUTPATIENT SERVICES | Facility: HOSPITAL | Age: 61
LOS: 1 days | End: 2021-09-17
Payer: MEDICARE

## 2021-09-17 DIAGNOSIS — Z98.1 ARTHRODESIS STATUS: Chronic | ICD-10-CM

## 2021-09-17 DIAGNOSIS — M86.071 ACUTE HEMATOGENOUS OSTEOMYELITIS, RIGHT ANKLE AND FOOT: ICD-10-CM

## 2021-09-17 DIAGNOSIS — L97.522 NON-PRESSURE CHRONIC ULCER OF OTHER PART OF LEFT FOOT WITH FAT LAYER EXPOSED: ICD-10-CM

## 2021-09-17 DIAGNOSIS — Z89.422 ACQUIRED ABSENCE OF OTHER LEFT TOE(S): Chronic | ICD-10-CM

## 2021-09-17 DIAGNOSIS — L97.512 NON-PRESSURE CHRONIC ULCER OF OTHER PART OF RIGHT FOOT WITH FAT LAYER EXPOSED: ICD-10-CM

## 2021-09-17 DIAGNOSIS — E11.49 TYPE 2 DIABETES MELLITUS WITH OTHER DIABETIC NEUROLOGICAL COMPLICATION: ICD-10-CM

## 2021-09-17 PROCEDURE — 82962 GLUCOSE BLOOD TEST: CPT

## 2021-09-17 PROCEDURE — 11042 DBRDMT SUBQ TIS 1ST 20SQCM/<: CPT

## 2021-09-17 PROCEDURE — G0277: CPT

## 2021-09-20 ENCOUNTER — OUTPATIENT (OUTPATIENT)
Dept: OUTPATIENT SERVICES | Facility: HOSPITAL | Age: 61
LOS: 1 days | End: 2021-09-20
Payer: MEDICARE

## 2021-09-20 DIAGNOSIS — L97.512 NON-PRESSURE CHRONIC ULCER OF OTHER PART OF RIGHT FOOT WITH FAT LAYER EXPOSED: ICD-10-CM

## 2021-09-20 DIAGNOSIS — L97.522 NON-PRESSURE CHRONIC ULCER OF OTHER PART OF LEFT FOOT WITH FAT LAYER EXPOSED: ICD-10-CM

## 2021-09-20 DIAGNOSIS — E11.49 TYPE 2 DIABETES MELLITUS WITH OTHER DIABETIC NEUROLOGICAL COMPLICATION: ICD-10-CM

## 2021-09-20 DIAGNOSIS — R26.81 UNSTEADINESS ON FEET: ICD-10-CM

## 2021-09-20 DIAGNOSIS — Z98.1 ARTHRODESIS STATUS: Chronic | ICD-10-CM

## 2021-09-20 DIAGNOSIS — M86.071 ACUTE HEMATOGENOUS OSTEOMYELITIS, RIGHT ANKLE AND FOOT: ICD-10-CM

## 2021-09-20 DIAGNOSIS — Z89.422 ACQUIRED ABSENCE OF OTHER LEFT TOE(S): Chronic | ICD-10-CM

## 2021-09-20 PROCEDURE — 82962 GLUCOSE BLOOD TEST: CPT

## 2021-09-20 PROCEDURE — G0277: CPT

## 2021-09-21 ENCOUNTER — OUTPATIENT (OUTPATIENT)
Dept: OUTPATIENT SERVICES | Facility: HOSPITAL | Age: 61
LOS: 1 days | End: 2021-09-21
Payer: MEDICARE

## 2021-09-21 DIAGNOSIS — Z98.1 ARTHRODESIS STATUS: Chronic | ICD-10-CM

## 2021-09-21 DIAGNOSIS — R26.81 UNSTEADINESS ON FEET: ICD-10-CM

## 2021-09-21 DIAGNOSIS — E11.49 TYPE 2 DIABETES MELLITUS WITH OTHER DIABETIC NEUROLOGICAL COMPLICATION: ICD-10-CM

## 2021-09-21 DIAGNOSIS — Z89.422 ACQUIRED ABSENCE OF OTHER LEFT TOE(S): Chronic | ICD-10-CM

## 2021-09-21 DIAGNOSIS — M86.071 ACUTE HEMATOGENOUS OSTEOMYELITIS, RIGHT ANKLE AND FOOT: ICD-10-CM

## 2021-09-21 DIAGNOSIS — L97.522 NON-PRESSURE CHRONIC ULCER OF OTHER PART OF LEFT FOOT WITH FAT LAYER EXPOSED: ICD-10-CM

## 2021-09-21 DIAGNOSIS — L97.512 NON-PRESSURE CHRONIC ULCER OF OTHER PART OF RIGHT FOOT WITH FAT LAYER EXPOSED: ICD-10-CM

## 2021-09-21 PROCEDURE — 99183 HYPERBARIC OXYGEN THERAPY: CPT

## 2021-09-21 PROCEDURE — U0005: CPT

## 2021-09-21 PROCEDURE — G0277: CPT

## 2021-09-21 PROCEDURE — U0003: CPT

## 2021-09-21 PROCEDURE — 82962 GLUCOSE BLOOD TEST: CPT

## 2021-09-22 ENCOUNTER — OUTPATIENT (OUTPATIENT)
Dept: OUTPATIENT SERVICES | Facility: HOSPITAL | Age: 61
LOS: 1 days | End: 2021-09-22
Payer: MEDICARE

## 2021-09-22 DIAGNOSIS — E11.49 TYPE 2 DIABETES MELLITUS WITH OTHER DIABETIC NEUROLOGICAL COMPLICATION: ICD-10-CM

## 2021-09-22 DIAGNOSIS — Z89.422 ACQUIRED ABSENCE OF OTHER LEFT TOE(S): Chronic | ICD-10-CM

## 2021-09-22 DIAGNOSIS — Z98.1 ARTHRODESIS STATUS: Chronic | ICD-10-CM

## 2021-09-22 DIAGNOSIS — M86.071 ACUTE HEMATOGENOUS OSTEOMYELITIS, RIGHT ANKLE AND FOOT: ICD-10-CM

## 2021-09-22 DIAGNOSIS — L97.522 NON-PRESSURE CHRONIC ULCER OF OTHER PART OF LEFT FOOT WITH FAT LAYER EXPOSED: ICD-10-CM

## 2021-09-22 DIAGNOSIS — L97.512 NON-PRESSURE CHRONIC ULCER OF OTHER PART OF RIGHT FOOT WITH FAT LAYER EXPOSED: ICD-10-CM

## 2021-09-22 PROCEDURE — G0277: CPT

## 2021-09-22 PROCEDURE — 82962 GLUCOSE BLOOD TEST: CPT

## 2021-09-23 ENCOUNTER — OUTPATIENT (OUTPATIENT)
Dept: OUTPATIENT SERVICES | Facility: HOSPITAL | Age: 61
LOS: 1 days | End: 2021-09-23
Payer: MEDICARE

## 2021-09-23 DIAGNOSIS — Z98.1 ARTHRODESIS STATUS: Chronic | ICD-10-CM

## 2021-09-23 DIAGNOSIS — E11.49 TYPE 2 DIABETES MELLITUS WITH OTHER DIABETIC NEUROLOGICAL COMPLICATION: ICD-10-CM

## 2021-09-23 DIAGNOSIS — M86.071 ACUTE HEMATOGENOUS OSTEOMYELITIS, RIGHT ANKLE AND FOOT: ICD-10-CM

## 2021-09-23 DIAGNOSIS — R26.81 UNSTEADINESS ON FEET: ICD-10-CM

## 2021-09-23 DIAGNOSIS — L97.512 NON-PRESSURE CHRONIC ULCER OF OTHER PART OF RIGHT FOOT WITH FAT LAYER EXPOSED: ICD-10-CM

## 2021-09-23 DIAGNOSIS — L97.522 NON-PRESSURE CHRONIC ULCER OF OTHER PART OF LEFT FOOT WITH FAT LAYER EXPOSED: ICD-10-CM

## 2021-09-23 DIAGNOSIS — Z89.422 ACQUIRED ABSENCE OF OTHER LEFT TOE(S): Chronic | ICD-10-CM

## 2021-09-23 PROCEDURE — 82962 GLUCOSE BLOOD TEST: CPT

## 2021-09-23 PROCEDURE — G0277: CPT

## 2021-09-24 ENCOUNTER — OUTPATIENT (OUTPATIENT)
Dept: OUTPATIENT SERVICES | Facility: HOSPITAL | Age: 61
LOS: 1 days | End: 2021-09-24
Payer: MEDICARE

## 2021-09-24 DIAGNOSIS — L97.512 NON-PRESSURE CHRONIC ULCER OF OTHER PART OF RIGHT FOOT WITH FAT LAYER EXPOSED: ICD-10-CM

## 2021-09-24 DIAGNOSIS — Z89.422 ACQUIRED ABSENCE OF OTHER LEFT TOE(S): Chronic | ICD-10-CM

## 2021-09-24 DIAGNOSIS — M86.071 ACUTE HEMATOGENOUS OSTEOMYELITIS, RIGHT ANKLE AND FOOT: ICD-10-CM

## 2021-09-24 DIAGNOSIS — E11.49 TYPE 2 DIABETES MELLITUS WITH OTHER DIABETIC NEUROLOGICAL COMPLICATION: ICD-10-CM

## 2021-09-24 DIAGNOSIS — Z98.1 ARTHRODESIS STATUS: Chronic | ICD-10-CM

## 2021-09-24 DIAGNOSIS — L97.522 NON-PRESSURE CHRONIC ULCER OF OTHER PART OF LEFT FOOT WITH FAT LAYER EXPOSED: ICD-10-CM

## 2021-09-24 PROCEDURE — G0277: CPT

## 2021-09-24 PROCEDURE — 82962 GLUCOSE BLOOD TEST: CPT

## 2021-09-24 PROCEDURE — 11042 DBRDMT SUBQ TIS 1ST 20SQCM/<: CPT

## 2021-09-27 ENCOUNTER — OUTPATIENT (OUTPATIENT)
Dept: OUTPATIENT SERVICES | Facility: HOSPITAL | Age: 61
LOS: 1 days | End: 2021-09-27
Payer: MEDICARE

## 2021-09-27 DIAGNOSIS — M86.071 ACUTE HEMATOGENOUS OSTEOMYELITIS, RIGHT ANKLE AND FOOT: ICD-10-CM

## 2021-09-27 DIAGNOSIS — E11.49 TYPE 2 DIABETES MELLITUS WITH OTHER DIABETIC NEUROLOGICAL COMPLICATION: ICD-10-CM

## 2021-09-27 DIAGNOSIS — L97.522 NON-PRESSURE CHRONIC ULCER OF OTHER PART OF LEFT FOOT WITH FAT LAYER EXPOSED: ICD-10-CM

## 2021-09-27 DIAGNOSIS — L97.512 NON-PRESSURE CHRONIC ULCER OF OTHER PART OF RIGHT FOOT WITH FAT LAYER EXPOSED: ICD-10-CM

## 2021-09-27 DIAGNOSIS — Z98.1 ARTHRODESIS STATUS: Chronic | ICD-10-CM

## 2021-09-27 DIAGNOSIS — Z89.422 ACQUIRED ABSENCE OF OTHER LEFT TOE(S): Chronic | ICD-10-CM

## 2021-09-27 PROCEDURE — G0463: CPT

## 2021-09-27 PROCEDURE — 82962 GLUCOSE BLOOD TEST: CPT

## 2021-10-01 ENCOUNTER — OUTPATIENT (OUTPATIENT)
Dept: OUTPATIENT SERVICES | Facility: HOSPITAL | Age: 61
LOS: 1 days | End: 2021-10-01
Payer: MEDICARE

## 2021-10-01 DIAGNOSIS — L97.522 NON-PRESSURE CHRONIC ULCER OF OTHER PART OF LEFT FOOT WITH FAT LAYER EXPOSED: ICD-10-CM

## 2021-10-01 DIAGNOSIS — M86.071 ACUTE HEMATOGENOUS OSTEOMYELITIS, RIGHT ANKLE AND FOOT: ICD-10-CM

## 2021-10-01 DIAGNOSIS — Z89.422 ACQUIRED ABSENCE OF OTHER LEFT TOE(S): Chronic | ICD-10-CM

## 2021-10-01 DIAGNOSIS — R26.81 UNSTEADINESS ON FEET: ICD-10-CM

## 2021-10-01 DIAGNOSIS — Z98.1 ARTHRODESIS STATUS: Chronic | ICD-10-CM

## 2021-10-01 DIAGNOSIS — E11.49 TYPE 2 DIABETES MELLITUS WITH OTHER DIABETIC NEUROLOGICAL COMPLICATION: ICD-10-CM

## 2021-10-01 DIAGNOSIS — L97.512 NON-PRESSURE CHRONIC ULCER OF OTHER PART OF RIGHT FOOT WITH FAT LAYER EXPOSED: ICD-10-CM

## 2021-10-01 PROCEDURE — 11042 DBRDMT SUBQ TIS 1ST 20SQCM/<: CPT

## 2021-10-01 PROCEDURE — G0463: CPT

## 2021-10-04 ENCOUNTER — OUTPATIENT (OUTPATIENT)
Dept: OUTPATIENT SERVICES | Facility: HOSPITAL | Age: 61
LOS: 1 days | End: 2021-10-04
Payer: MEDICARE

## 2021-10-04 DIAGNOSIS — M86.071 ACUTE HEMATOGENOUS OSTEOMYELITIS, RIGHT ANKLE AND FOOT: ICD-10-CM

## 2021-10-04 DIAGNOSIS — Z98.1 ARTHRODESIS STATUS: Chronic | ICD-10-CM

## 2021-10-04 DIAGNOSIS — L97.522 NON-PRESSURE CHRONIC ULCER OF OTHER PART OF LEFT FOOT WITH FAT LAYER EXPOSED: ICD-10-CM

## 2021-10-04 DIAGNOSIS — Z89.422 ACQUIRED ABSENCE OF OTHER LEFT TOE(S): Chronic | ICD-10-CM

## 2021-10-04 DIAGNOSIS — L97.512 NON-PRESSURE CHRONIC ULCER OF OTHER PART OF RIGHT FOOT WITH FAT LAYER EXPOSED: ICD-10-CM

## 2021-10-04 DIAGNOSIS — E11.49 TYPE 2 DIABETES MELLITUS WITH OTHER DIABETIC NEUROLOGICAL COMPLICATION: ICD-10-CM

## 2021-10-04 PROCEDURE — G0463: CPT

## 2021-10-12 ENCOUNTER — OUTPATIENT (OUTPATIENT)
Dept: OUTPATIENT SERVICES | Facility: HOSPITAL | Age: 61
LOS: 1 days | End: 2021-10-12
Payer: MEDICARE

## 2021-10-12 DIAGNOSIS — L97.512 NON-PRESSURE CHRONIC ULCER OF OTHER PART OF RIGHT FOOT WITH FAT LAYER EXPOSED: ICD-10-CM

## 2021-10-12 DIAGNOSIS — M86.071 ACUTE HEMATOGENOUS OSTEOMYELITIS, RIGHT ANKLE AND FOOT: ICD-10-CM

## 2021-10-12 DIAGNOSIS — E11.49 TYPE 2 DIABETES MELLITUS WITH OTHER DIABETIC NEUROLOGICAL COMPLICATION: ICD-10-CM

## 2021-10-12 DIAGNOSIS — Z98.1 ARTHRODESIS STATUS: Chronic | ICD-10-CM

## 2021-10-12 DIAGNOSIS — Z89.422 ACQUIRED ABSENCE OF OTHER LEFT TOE(S): Chronic | ICD-10-CM

## 2021-10-12 DIAGNOSIS — L97.522 NON-PRESSURE CHRONIC ULCER OF OTHER PART OF LEFT FOOT WITH FAT LAYER EXPOSED: ICD-10-CM

## 2021-10-12 DIAGNOSIS — R26.81 UNSTEADINESS ON FEET: ICD-10-CM

## 2021-10-12 PROCEDURE — 82962 GLUCOSE BLOOD TEST: CPT

## 2021-10-12 PROCEDURE — G0277: CPT

## 2021-10-13 ENCOUNTER — OUTPATIENT (OUTPATIENT)
Dept: OUTPATIENT SERVICES | Facility: HOSPITAL | Age: 61
LOS: 1 days | End: 2021-10-13
Payer: MEDICARE

## 2021-10-13 DIAGNOSIS — Z89.422 ACQUIRED ABSENCE OF OTHER LEFT TOE(S): Chronic | ICD-10-CM

## 2021-10-13 DIAGNOSIS — L97.512 NON-PRESSURE CHRONIC ULCER OF OTHER PART OF RIGHT FOOT WITH FAT LAYER EXPOSED: ICD-10-CM

## 2021-10-13 DIAGNOSIS — M86.371 CHRONIC MULTIFOCAL OSTEOMYELITIS, RIGHT ANKLE AND FOOT: ICD-10-CM

## 2021-10-13 DIAGNOSIS — E11.49 TYPE 2 DIABETES MELLITUS WITH OTHER DIABETIC NEUROLOGICAL COMPLICATION: ICD-10-CM

## 2021-10-13 DIAGNOSIS — Z98.1 ARTHRODESIS STATUS: Chronic | ICD-10-CM

## 2021-10-13 DIAGNOSIS — L97.522 NON-PRESSURE CHRONIC ULCER OF OTHER PART OF LEFT FOOT WITH FAT LAYER EXPOSED: ICD-10-CM

## 2021-10-13 PROCEDURE — U0005: CPT

## 2021-10-13 PROCEDURE — G0277: CPT

## 2021-10-13 PROCEDURE — U0003: CPT

## 2021-10-13 PROCEDURE — 82962 GLUCOSE BLOOD TEST: CPT

## 2021-10-14 ENCOUNTER — OUTPATIENT (OUTPATIENT)
Dept: OUTPATIENT SERVICES | Facility: HOSPITAL | Age: 61
LOS: 1 days | End: 2021-10-14
Payer: MEDICARE

## 2021-10-14 DIAGNOSIS — L97.512 NON-PRESSURE CHRONIC ULCER OF OTHER PART OF RIGHT FOOT WITH FAT LAYER EXPOSED: ICD-10-CM

## 2021-10-14 DIAGNOSIS — E11.49 TYPE 2 DIABETES MELLITUS WITH OTHER DIABETIC NEUROLOGICAL COMPLICATION: ICD-10-CM

## 2021-10-14 DIAGNOSIS — Z98.1 ARTHRODESIS STATUS: Chronic | ICD-10-CM

## 2021-10-14 DIAGNOSIS — L97.522 NON-PRESSURE CHRONIC ULCER OF OTHER PART OF LEFT FOOT WITH FAT LAYER EXPOSED: ICD-10-CM

## 2021-10-14 DIAGNOSIS — Z89.422 ACQUIRED ABSENCE OF OTHER LEFT TOE(S): Chronic | ICD-10-CM

## 2021-10-14 DIAGNOSIS — R26.81 UNSTEADINESS ON FEET: ICD-10-CM

## 2021-10-14 PROCEDURE — G0277: CPT

## 2021-10-14 PROCEDURE — 99183 HYPERBARIC OXYGEN THERAPY: CPT

## 2021-10-14 PROCEDURE — 82962 GLUCOSE BLOOD TEST: CPT

## 2021-10-15 ENCOUNTER — OUTPATIENT (OUTPATIENT)
Dept: OUTPATIENT SERVICES | Facility: HOSPITAL | Age: 61
LOS: 1 days | End: 2021-10-15
Payer: MEDICARE

## 2021-10-15 DIAGNOSIS — L97.512 NON-PRESSURE CHRONIC ULCER OF OTHER PART OF RIGHT FOOT WITH FAT LAYER EXPOSED: ICD-10-CM

## 2021-10-15 DIAGNOSIS — E11.49 TYPE 2 DIABETES MELLITUS WITH OTHER DIABETIC NEUROLOGICAL COMPLICATION: ICD-10-CM

## 2021-10-15 DIAGNOSIS — Z98.1 ARTHRODESIS STATUS: Chronic | ICD-10-CM

## 2021-10-15 DIAGNOSIS — Z89.422 ACQUIRED ABSENCE OF OTHER LEFT TOE(S): Chronic | ICD-10-CM

## 2021-10-15 DIAGNOSIS — L97.522 NON-PRESSURE CHRONIC ULCER OF OTHER PART OF LEFT FOOT WITH FAT LAYER EXPOSED: ICD-10-CM

## 2021-10-15 DIAGNOSIS — M86.371 CHRONIC MULTIFOCAL OSTEOMYELITIS, RIGHT ANKLE AND FOOT: ICD-10-CM

## 2021-10-15 PROCEDURE — G0277: CPT

## 2021-10-15 PROCEDURE — 82962 GLUCOSE BLOOD TEST: CPT

## 2021-10-15 PROCEDURE — 11042 DBRDMT SUBQ TIS 1ST 20SQCM/<: CPT

## 2021-10-18 ENCOUNTER — OUTPATIENT (OUTPATIENT)
Dept: OUTPATIENT SERVICES | Facility: HOSPITAL | Age: 61
LOS: 1 days | End: 2021-10-18
Payer: MEDICARE

## 2021-10-18 DIAGNOSIS — L97.522 NON-PRESSURE CHRONIC ULCER OF OTHER PART OF LEFT FOOT WITH FAT LAYER EXPOSED: ICD-10-CM

## 2021-10-18 DIAGNOSIS — L97.512 NON-PRESSURE CHRONIC ULCER OF OTHER PART OF RIGHT FOOT WITH FAT LAYER EXPOSED: ICD-10-CM

## 2021-10-18 DIAGNOSIS — Z98.1 ARTHRODESIS STATUS: Chronic | ICD-10-CM

## 2021-10-18 DIAGNOSIS — E11.49 TYPE 2 DIABETES MELLITUS WITH OTHER DIABETIC NEUROLOGICAL COMPLICATION: ICD-10-CM

## 2021-10-18 DIAGNOSIS — Z89.422 ACQUIRED ABSENCE OF OTHER LEFT TOE(S): Chronic | ICD-10-CM

## 2021-10-18 DIAGNOSIS — M86.071 ACUTE HEMATOGENOUS OSTEOMYELITIS, RIGHT ANKLE AND FOOT: ICD-10-CM

## 2021-10-18 DIAGNOSIS — M06.071: ICD-10-CM

## 2021-10-18 PROCEDURE — 82962 GLUCOSE BLOOD TEST: CPT

## 2021-10-18 PROCEDURE — G0463: CPT

## 2021-10-19 ENCOUNTER — OUTPATIENT (OUTPATIENT)
Dept: OUTPATIENT SERVICES | Facility: HOSPITAL | Age: 61
LOS: 1 days | End: 2021-10-19
Payer: MEDICARE

## 2021-10-19 DIAGNOSIS — M86.071 ACUTE HEMATOGENOUS OSTEOMYELITIS, RIGHT ANKLE AND FOOT: ICD-10-CM

## 2021-10-19 DIAGNOSIS — L97.522 NON-PRESSURE CHRONIC ULCER OF OTHER PART OF LEFT FOOT WITH FAT LAYER EXPOSED: ICD-10-CM

## 2021-10-19 DIAGNOSIS — Z98.1 ARTHRODESIS STATUS: Chronic | ICD-10-CM

## 2021-10-19 DIAGNOSIS — E11.49 TYPE 2 DIABETES MELLITUS WITH OTHER DIABETIC NEUROLOGICAL COMPLICATION: ICD-10-CM

## 2021-10-19 DIAGNOSIS — Z89.422 ACQUIRED ABSENCE OF OTHER LEFT TOE(S): Chronic | ICD-10-CM

## 2021-10-19 DIAGNOSIS — L97.512 NON-PRESSURE CHRONIC ULCER OF OTHER PART OF RIGHT FOOT WITH FAT LAYER EXPOSED: ICD-10-CM

## 2021-10-19 PROCEDURE — 99183 HYPERBARIC OXYGEN THERAPY: CPT

## 2021-10-19 PROCEDURE — 82962 GLUCOSE BLOOD TEST: CPT

## 2021-10-19 PROCEDURE — G0277: CPT

## 2021-10-20 ENCOUNTER — OUTPATIENT (OUTPATIENT)
Dept: OUTPATIENT SERVICES | Facility: HOSPITAL | Age: 61
LOS: 1 days | End: 2021-10-20
Payer: MEDICARE

## 2021-10-20 DIAGNOSIS — M86.071 ACUTE HEMATOGENOUS OSTEOMYELITIS, RIGHT ANKLE AND FOOT: ICD-10-CM

## 2021-10-20 DIAGNOSIS — E11.49 TYPE 2 DIABETES MELLITUS WITH OTHER DIABETIC NEUROLOGICAL COMPLICATION: ICD-10-CM

## 2021-10-20 DIAGNOSIS — L97.512 NON-PRESSURE CHRONIC ULCER OF OTHER PART OF RIGHT FOOT WITH FAT LAYER EXPOSED: ICD-10-CM

## 2021-10-20 DIAGNOSIS — Z98.1 ARTHRODESIS STATUS: Chronic | ICD-10-CM

## 2021-10-20 DIAGNOSIS — L97.522 NON-PRESSURE CHRONIC ULCER OF OTHER PART OF LEFT FOOT WITH FAT LAYER EXPOSED: ICD-10-CM

## 2021-10-20 DIAGNOSIS — Z89.422 ACQUIRED ABSENCE OF OTHER LEFT TOE(S): Chronic | ICD-10-CM

## 2021-10-20 PROCEDURE — 82962 GLUCOSE BLOOD TEST: CPT

## 2021-10-20 PROCEDURE — G0277: CPT

## 2021-10-21 ENCOUNTER — OUTPATIENT (OUTPATIENT)
Dept: OUTPATIENT SERVICES | Facility: HOSPITAL | Age: 61
LOS: 1 days | End: 2021-10-21
Payer: MEDICARE

## 2021-10-21 DIAGNOSIS — L97.522 NON-PRESSURE CHRONIC ULCER OF OTHER PART OF LEFT FOOT WITH FAT LAYER EXPOSED: ICD-10-CM

## 2021-10-21 DIAGNOSIS — Z98.1 ARTHRODESIS STATUS: Chronic | ICD-10-CM

## 2021-10-21 DIAGNOSIS — Z89.422 ACQUIRED ABSENCE OF OTHER LEFT TOE(S): Chronic | ICD-10-CM

## 2021-10-21 PROCEDURE — U0005: CPT

## 2021-10-21 PROCEDURE — U0003: CPT

## 2021-10-22 ENCOUNTER — OUTPATIENT (OUTPATIENT)
Dept: OUTPATIENT SERVICES | Facility: HOSPITAL | Age: 61
LOS: 1 days | End: 2021-10-22
Payer: MEDICARE

## 2021-10-22 DIAGNOSIS — L97.522 NON-PRESSURE CHRONIC ULCER OF OTHER PART OF LEFT FOOT WITH FAT LAYER EXPOSED: ICD-10-CM

## 2021-10-22 DIAGNOSIS — Z89.422 ACQUIRED ABSENCE OF OTHER LEFT TOE(S): Chronic | ICD-10-CM

## 2021-10-22 DIAGNOSIS — E11.49 TYPE 2 DIABETES MELLITUS WITH OTHER DIABETIC NEUROLOGICAL COMPLICATION: ICD-10-CM

## 2021-10-22 DIAGNOSIS — Z98.1 ARTHRODESIS STATUS: Chronic | ICD-10-CM

## 2021-10-22 DIAGNOSIS — L97.512 NON-PRESSURE CHRONIC ULCER OF OTHER PART OF RIGHT FOOT WITH FAT LAYER EXPOSED: ICD-10-CM

## 2021-10-22 DIAGNOSIS — M86.071 ACUTE HEMATOGENOUS OSTEOMYELITIS, RIGHT ANKLE AND FOOT: ICD-10-CM

## 2021-10-22 PROCEDURE — 82962 GLUCOSE BLOOD TEST: CPT

## 2021-10-22 PROCEDURE — G0277: CPT

## 2021-10-22 PROCEDURE — 11042 DBRDMT SUBQ TIS 1ST 20SQCM/<: CPT

## 2021-10-25 ENCOUNTER — OUTPATIENT (OUTPATIENT)
Dept: OUTPATIENT SERVICES | Facility: HOSPITAL | Age: 61
LOS: 1 days | End: 2021-10-25
Payer: MEDICARE

## 2021-10-25 DIAGNOSIS — L97.522 NON-PRESSURE CHRONIC ULCER OF OTHER PART OF LEFT FOOT WITH FAT LAYER EXPOSED: ICD-10-CM

## 2021-10-25 DIAGNOSIS — Z98.1 ARTHRODESIS STATUS: Chronic | ICD-10-CM

## 2021-10-25 DIAGNOSIS — Z89.422 ACQUIRED ABSENCE OF OTHER LEFT TOE(S): Chronic | ICD-10-CM

## 2021-10-25 PROCEDURE — 82962 GLUCOSE BLOOD TEST: CPT

## 2021-10-25 PROCEDURE — G0277: CPT

## 2021-10-26 ENCOUNTER — OUTPATIENT (OUTPATIENT)
Dept: OUTPATIENT SERVICES | Facility: HOSPITAL | Age: 61
LOS: 1 days | End: 2021-10-26
Payer: MEDICARE

## 2021-10-26 DIAGNOSIS — L97.522 NON-PRESSURE CHRONIC ULCER OF OTHER PART OF LEFT FOOT WITH FAT LAYER EXPOSED: ICD-10-CM

## 2021-10-26 DIAGNOSIS — L97.512 NON-PRESSURE CHRONIC ULCER OF OTHER PART OF RIGHT FOOT WITH FAT LAYER EXPOSED: ICD-10-CM

## 2021-10-26 DIAGNOSIS — R26.81 UNSTEADINESS ON FEET: ICD-10-CM

## 2021-10-26 DIAGNOSIS — Z89.422 ACQUIRED ABSENCE OF OTHER LEFT TOE(S): Chronic | ICD-10-CM

## 2021-10-26 DIAGNOSIS — E11.49 TYPE 2 DIABETES MELLITUS WITH OTHER DIABETIC NEUROLOGICAL COMPLICATION: ICD-10-CM

## 2021-10-26 DIAGNOSIS — Z98.1 ARTHRODESIS STATUS: Chronic | ICD-10-CM

## 2021-10-26 DIAGNOSIS — M86.071 ACUTE HEMATOGENOUS OSTEOMYELITIS, RIGHT ANKLE AND FOOT: ICD-10-CM

## 2021-10-26 PROCEDURE — 82962 GLUCOSE BLOOD TEST: CPT

## 2021-10-26 PROCEDURE — 99183 HYPERBARIC OXYGEN THERAPY: CPT

## 2021-10-26 PROCEDURE — G0277: CPT

## 2021-10-27 ENCOUNTER — OUTPATIENT (OUTPATIENT)
Dept: OUTPATIENT SERVICES | Facility: HOSPITAL | Age: 61
LOS: 1 days | End: 2021-10-27
Payer: MEDICARE

## 2021-10-27 DIAGNOSIS — Z89.422 ACQUIRED ABSENCE OF OTHER LEFT TOE(S): Chronic | ICD-10-CM

## 2021-10-27 DIAGNOSIS — L97.522 NON-PRESSURE CHRONIC ULCER OF OTHER PART OF LEFT FOOT WITH FAT LAYER EXPOSED: ICD-10-CM

## 2021-10-27 DIAGNOSIS — L97.512 NON-PRESSURE CHRONIC ULCER OF OTHER PART OF RIGHT FOOT WITH FAT LAYER EXPOSED: ICD-10-CM

## 2021-10-27 DIAGNOSIS — Z98.1 ARTHRODESIS STATUS: Chronic | ICD-10-CM

## 2021-10-27 DIAGNOSIS — M86.071 ACUTE HEMATOGENOUS OSTEOMYELITIS, RIGHT ANKLE AND FOOT: ICD-10-CM

## 2021-10-27 DIAGNOSIS — E11.49 TYPE 2 DIABETES MELLITUS WITH OTHER DIABETIC NEUROLOGICAL COMPLICATION: ICD-10-CM

## 2021-10-27 PROCEDURE — 82962 GLUCOSE BLOOD TEST: CPT

## 2021-10-27 PROCEDURE — G0277: CPT

## 2021-10-28 ENCOUNTER — OUTPATIENT (OUTPATIENT)
Dept: OUTPATIENT SERVICES | Facility: HOSPITAL | Age: 61
LOS: 1 days | End: 2021-10-28
Payer: MEDICARE

## 2021-10-28 DIAGNOSIS — L97.522 NON-PRESSURE CHRONIC ULCER OF OTHER PART OF LEFT FOOT WITH FAT LAYER EXPOSED: ICD-10-CM

## 2021-10-28 DIAGNOSIS — Z89.422 ACQUIRED ABSENCE OF OTHER LEFT TOE(S): Chronic | ICD-10-CM

## 2021-10-28 DIAGNOSIS — Z98.1 ARTHRODESIS STATUS: Chronic | ICD-10-CM

## 2021-10-28 PROCEDURE — 99183 HYPERBARIC OXYGEN THERAPY: CPT

## 2021-10-28 PROCEDURE — G0277: CPT

## 2021-10-28 PROCEDURE — 82962 GLUCOSE BLOOD TEST: CPT

## 2021-10-29 ENCOUNTER — OUTPATIENT (OUTPATIENT)
Dept: OUTPATIENT SERVICES | Facility: HOSPITAL | Age: 61
LOS: 1 days | End: 2021-10-29
Payer: MEDICARE

## 2021-10-29 DIAGNOSIS — L97.512 NON-PRESSURE CHRONIC ULCER OF OTHER PART OF RIGHT FOOT WITH FAT LAYER EXPOSED: ICD-10-CM

## 2021-10-29 DIAGNOSIS — E11.49 TYPE 2 DIABETES MELLITUS WITH OTHER DIABETIC NEUROLOGICAL COMPLICATION: ICD-10-CM

## 2021-10-29 DIAGNOSIS — Z89.422 ACQUIRED ABSENCE OF OTHER LEFT TOE(S): Chronic | ICD-10-CM

## 2021-10-29 DIAGNOSIS — Z98.1 ARTHRODESIS STATUS: Chronic | ICD-10-CM

## 2021-10-29 DIAGNOSIS — M86.071 ACUTE HEMATOGENOUS OSTEOMYELITIS, RIGHT ANKLE AND FOOT: ICD-10-CM

## 2021-10-29 DIAGNOSIS — L97.522 NON-PRESSURE CHRONIC ULCER OF OTHER PART OF LEFT FOOT WITH FAT LAYER EXPOSED: ICD-10-CM

## 2021-10-29 PROCEDURE — 97597 DBRDMT OPN WND 1ST 20 CM/<: CPT

## 2021-11-01 ENCOUNTER — OUTPATIENT (OUTPATIENT)
Dept: OUTPATIENT SERVICES | Facility: HOSPITAL | Age: 61
LOS: 1 days | End: 2021-11-01
Payer: MEDICARE

## 2021-11-01 DIAGNOSIS — E11.49 TYPE 2 DIABETES MELLITUS WITH OTHER DIABETIC NEUROLOGICAL COMPLICATION: ICD-10-CM

## 2021-11-01 DIAGNOSIS — Z89.422 ACQUIRED ABSENCE OF OTHER LEFT TOE(S): Chronic | ICD-10-CM

## 2021-11-01 DIAGNOSIS — L97.512 NON-PRESSURE CHRONIC ULCER OF OTHER PART OF RIGHT FOOT WITH FAT LAYER EXPOSED: ICD-10-CM

## 2021-11-01 DIAGNOSIS — M86.071 ACUTE HEMATOGENOUS OSTEOMYELITIS, RIGHT ANKLE AND FOOT: ICD-10-CM

## 2021-11-01 DIAGNOSIS — L97.522 NON-PRESSURE CHRONIC ULCER OF OTHER PART OF LEFT FOOT WITH FAT LAYER EXPOSED: ICD-10-CM

## 2021-11-01 DIAGNOSIS — Z98.1 ARTHRODESIS STATUS: Chronic | ICD-10-CM

## 2021-11-01 PROCEDURE — G0277: CPT

## 2021-11-01 PROCEDURE — 82962 GLUCOSE BLOOD TEST: CPT

## 2021-11-01 PROCEDURE — U0003: CPT

## 2021-11-01 PROCEDURE — U0005: CPT

## 2021-11-02 ENCOUNTER — OUTPATIENT (OUTPATIENT)
Dept: OUTPATIENT SERVICES | Facility: HOSPITAL | Age: 61
LOS: 1 days | End: 2021-11-02
Payer: MEDICARE

## 2021-11-02 DIAGNOSIS — E11.49 TYPE 2 DIABETES MELLITUS WITH OTHER DIABETIC NEUROLOGICAL COMPLICATION: ICD-10-CM

## 2021-11-02 DIAGNOSIS — L97.512 NON-PRESSURE CHRONIC ULCER OF OTHER PART OF RIGHT FOOT WITH FAT LAYER EXPOSED: ICD-10-CM

## 2021-11-02 DIAGNOSIS — M86.071 ACUTE HEMATOGENOUS OSTEOMYELITIS, RIGHT ANKLE AND FOOT: ICD-10-CM

## 2021-11-02 DIAGNOSIS — Z98.1 ARTHRODESIS STATUS: Chronic | ICD-10-CM

## 2021-11-02 DIAGNOSIS — L97.522 NON-PRESSURE CHRONIC ULCER OF OTHER PART OF LEFT FOOT WITH FAT LAYER EXPOSED: ICD-10-CM

## 2021-11-02 DIAGNOSIS — Z89.422 ACQUIRED ABSENCE OF OTHER LEFT TOE(S): Chronic | ICD-10-CM

## 2021-11-02 PROCEDURE — 99183 HYPERBARIC OXYGEN THERAPY: CPT

## 2021-11-02 PROCEDURE — 82962 GLUCOSE BLOOD TEST: CPT

## 2021-11-02 PROCEDURE — G0277: CPT

## 2021-11-03 ENCOUNTER — OUTPATIENT (OUTPATIENT)
Dept: OUTPATIENT SERVICES | Facility: HOSPITAL | Age: 61
LOS: 1 days | End: 2021-11-03
Payer: MEDICARE

## 2021-11-03 DIAGNOSIS — Z98.1 ARTHRODESIS STATUS: Chronic | ICD-10-CM

## 2021-11-03 DIAGNOSIS — Z89.422 ACQUIRED ABSENCE OF OTHER LEFT TOE(S): Chronic | ICD-10-CM

## 2021-11-03 DIAGNOSIS — L97.522 NON-PRESSURE CHRONIC ULCER OF OTHER PART OF LEFT FOOT WITH FAT LAYER EXPOSED: ICD-10-CM

## 2021-11-03 PROCEDURE — G0277: CPT

## 2021-11-03 PROCEDURE — 82962 GLUCOSE BLOOD TEST: CPT

## 2021-11-04 ENCOUNTER — OUTPATIENT (OUTPATIENT)
Dept: OUTPATIENT SERVICES | Facility: HOSPITAL | Age: 61
LOS: 1 days | End: 2021-11-04
Payer: MEDICARE

## 2021-11-04 DIAGNOSIS — E11.49 TYPE 2 DIABETES MELLITUS WITH OTHER DIABETIC NEUROLOGICAL COMPLICATION: ICD-10-CM

## 2021-11-04 DIAGNOSIS — L97.512 NON-PRESSURE CHRONIC ULCER OF OTHER PART OF RIGHT FOOT WITH FAT LAYER EXPOSED: ICD-10-CM

## 2021-11-04 DIAGNOSIS — Z98.1 ARTHRODESIS STATUS: Chronic | ICD-10-CM

## 2021-11-04 DIAGNOSIS — M86.071 ACUTE HEMATOGENOUS OSTEOMYELITIS, RIGHT ANKLE AND FOOT: ICD-10-CM

## 2021-11-04 DIAGNOSIS — Z89.422 ACQUIRED ABSENCE OF OTHER LEFT TOE(S): Chronic | ICD-10-CM

## 2021-11-04 DIAGNOSIS — L97.522 NON-PRESSURE CHRONIC ULCER OF OTHER PART OF LEFT FOOT WITH FAT LAYER EXPOSED: ICD-10-CM

## 2021-11-04 PROCEDURE — 82962 GLUCOSE BLOOD TEST: CPT

## 2021-11-04 PROCEDURE — 99183 HYPERBARIC OXYGEN THERAPY: CPT

## 2021-11-04 PROCEDURE — G0277: CPT

## 2021-11-05 ENCOUNTER — OUTPATIENT (OUTPATIENT)
Dept: OUTPATIENT SERVICES | Facility: HOSPITAL | Age: 61
LOS: 1 days | End: 2021-11-05
Payer: MEDICARE

## 2021-11-05 DIAGNOSIS — M86.071 ACUTE HEMATOGENOUS OSTEOMYELITIS, RIGHT ANKLE AND FOOT: ICD-10-CM

## 2021-11-05 DIAGNOSIS — Z89.422 ACQUIRED ABSENCE OF OTHER LEFT TOE(S): Chronic | ICD-10-CM

## 2021-11-05 DIAGNOSIS — L97.512 NON-PRESSURE CHRONIC ULCER OF OTHER PART OF RIGHT FOOT WITH FAT LAYER EXPOSED: ICD-10-CM

## 2021-11-05 DIAGNOSIS — L97.522 NON-PRESSURE CHRONIC ULCER OF OTHER PART OF LEFT FOOT WITH FAT LAYER EXPOSED: ICD-10-CM

## 2021-11-05 DIAGNOSIS — Z98.1 ARTHRODESIS STATUS: Chronic | ICD-10-CM

## 2021-11-05 DIAGNOSIS — E11.49 TYPE 2 DIABETES MELLITUS WITH OTHER DIABETIC NEUROLOGICAL COMPLICATION: ICD-10-CM

## 2021-11-05 PROCEDURE — 11042 DBRDMT SUBQ TIS 1ST 20SQCM/<: CPT

## 2021-11-08 ENCOUNTER — OUTPATIENT (OUTPATIENT)
Dept: OUTPATIENT SERVICES | Facility: HOSPITAL | Age: 61
LOS: 1 days | End: 2021-11-08
Payer: MEDICARE

## 2021-11-08 DIAGNOSIS — Z98.1 ARTHRODESIS STATUS: Chronic | ICD-10-CM

## 2021-11-08 DIAGNOSIS — E11.49 TYPE 2 DIABETES MELLITUS WITH OTHER DIABETIC NEUROLOGICAL COMPLICATION: ICD-10-CM

## 2021-11-08 DIAGNOSIS — Z89.422 ACQUIRED ABSENCE OF OTHER LEFT TOE(S): Chronic | ICD-10-CM

## 2021-11-08 DIAGNOSIS — L97.522 NON-PRESSURE CHRONIC ULCER OF OTHER PART OF LEFT FOOT WITH FAT LAYER EXPOSED: ICD-10-CM

## 2021-11-08 DIAGNOSIS — L97.512 NON-PRESSURE CHRONIC ULCER OF OTHER PART OF RIGHT FOOT WITH FAT LAYER EXPOSED: ICD-10-CM

## 2021-11-08 DIAGNOSIS — M86.071 ACUTE HEMATOGENOUS OSTEOMYELITIS, RIGHT ANKLE AND FOOT: ICD-10-CM

## 2021-11-08 PROCEDURE — 82962 GLUCOSE BLOOD TEST: CPT

## 2021-11-08 PROCEDURE — U0003: CPT

## 2021-11-08 PROCEDURE — U0005: CPT

## 2021-11-08 PROCEDURE — G0277: CPT

## 2021-11-09 ENCOUNTER — OUTPATIENT (OUTPATIENT)
Dept: OUTPATIENT SERVICES | Facility: HOSPITAL | Age: 61
LOS: 1 days | End: 2021-11-09
Payer: MEDICARE

## 2021-11-09 DIAGNOSIS — Z89.422 ACQUIRED ABSENCE OF OTHER LEFT TOE(S): Chronic | ICD-10-CM

## 2021-11-09 DIAGNOSIS — L97.522 NON-PRESSURE CHRONIC ULCER OF OTHER PART OF LEFT FOOT WITH FAT LAYER EXPOSED: ICD-10-CM

## 2021-11-09 DIAGNOSIS — Z98.1 ARTHRODESIS STATUS: Chronic | ICD-10-CM

## 2021-11-09 PROCEDURE — 82962 GLUCOSE BLOOD TEST: CPT

## 2021-11-09 PROCEDURE — 99183 HYPERBARIC OXYGEN THERAPY: CPT

## 2021-11-09 PROCEDURE — G0277: CPT

## 2021-11-10 ENCOUNTER — OUTPATIENT (OUTPATIENT)
Dept: OUTPATIENT SERVICES | Facility: HOSPITAL | Age: 61
LOS: 1 days | End: 2021-11-10
Payer: MEDICARE

## 2021-11-10 DIAGNOSIS — E11.49 TYPE 2 DIABETES MELLITUS WITH OTHER DIABETIC NEUROLOGICAL COMPLICATION: ICD-10-CM

## 2021-11-10 DIAGNOSIS — M86.071 ACUTE HEMATOGENOUS OSTEOMYELITIS, RIGHT ANKLE AND FOOT: ICD-10-CM

## 2021-11-10 DIAGNOSIS — L97.522 NON-PRESSURE CHRONIC ULCER OF OTHER PART OF LEFT FOOT WITH FAT LAYER EXPOSED: ICD-10-CM

## 2021-11-10 DIAGNOSIS — Z89.422 ACQUIRED ABSENCE OF OTHER LEFT TOE(S): Chronic | ICD-10-CM

## 2021-11-10 DIAGNOSIS — Z98.1 ARTHRODESIS STATUS: Chronic | ICD-10-CM

## 2021-11-10 DIAGNOSIS — L97.512 NON-PRESSURE CHRONIC ULCER OF OTHER PART OF RIGHT FOOT WITH FAT LAYER EXPOSED: ICD-10-CM

## 2021-11-10 PROCEDURE — 82962 GLUCOSE BLOOD TEST: CPT

## 2021-11-10 PROCEDURE — G0277: CPT

## 2021-11-12 ENCOUNTER — OUTPATIENT (OUTPATIENT)
Dept: OUTPATIENT SERVICES | Facility: HOSPITAL | Age: 61
LOS: 1 days | End: 2021-11-12
Payer: MEDICARE

## 2021-11-12 DIAGNOSIS — M86.071 ACUTE HEMATOGENOUS OSTEOMYELITIS, RIGHT ANKLE AND FOOT: ICD-10-CM

## 2021-11-12 DIAGNOSIS — L97.512 NON-PRESSURE CHRONIC ULCER OF OTHER PART OF RIGHT FOOT WITH FAT LAYER EXPOSED: ICD-10-CM

## 2021-11-12 DIAGNOSIS — Z89.422 ACQUIRED ABSENCE OF OTHER LEFT TOE(S): Chronic | ICD-10-CM

## 2021-11-12 DIAGNOSIS — L97.522 NON-PRESSURE CHRONIC ULCER OF OTHER PART OF LEFT FOOT WITH FAT LAYER EXPOSED: ICD-10-CM

## 2021-11-12 DIAGNOSIS — E11.49 TYPE 2 DIABETES MELLITUS WITH OTHER DIABETIC NEUROLOGICAL COMPLICATION: ICD-10-CM

## 2021-11-12 DIAGNOSIS — Z98.1 ARTHRODESIS STATUS: Chronic | ICD-10-CM

## 2021-11-12 PROCEDURE — 82962 GLUCOSE BLOOD TEST: CPT

## 2021-11-12 PROCEDURE — G0463: CPT | Mod: 25

## 2021-11-12 PROCEDURE — G0277: CPT

## 2021-11-15 ENCOUNTER — OUTPATIENT (OUTPATIENT)
Dept: OUTPATIENT SERVICES | Facility: HOSPITAL | Age: 61
LOS: 1 days | End: 2021-11-15
Payer: MEDICARE

## 2021-11-15 DIAGNOSIS — L97.512 NON-PRESSURE CHRONIC ULCER OF OTHER PART OF RIGHT FOOT WITH FAT LAYER EXPOSED: ICD-10-CM

## 2021-11-15 DIAGNOSIS — M86.071 ACUTE HEMATOGENOUS OSTEOMYELITIS, RIGHT ANKLE AND FOOT: ICD-10-CM

## 2021-11-15 DIAGNOSIS — E11.49 TYPE 2 DIABETES MELLITUS WITH OTHER DIABETIC NEUROLOGICAL COMPLICATION: ICD-10-CM

## 2021-11-15 DIAGNOSIS — L97.522 NON-PRESSURE CHRONIC ULCER OF OTHER PART OF LEFT FOOT WITH FAT LAYER EXPOSED: ICD-10-CM

## 2021-11-15 DIAGNOSIS — Z89.422 ACQUIRED ABSENCE OF OTHER LEFT TOE(S): Chronic | ICD-10-CM

## 2021-11-15 DIAGNOSIS — Z98.1 ARTHRODESIS STATUS: Chronic | ICD-10-CM

## 2021-11-15 PROCEDURE — 82962 GLUCOSE BLOOD TEST: CPT

## 2021-11-15 PROCEDURE — G0277: CPT

## 2021-11-16 ENCOUNTER — OUTPATIENT (OUTPATIENT)
Dept: OUTPATIENT SERVICES | Facility: HOSPITAL | Age: 61
LOS: 1 days | End: 2021-11-16

## 2021-11-16 DIAGNOSIS — Z89.422 ACQUIRED ABSENCE OF OTHER LEFT TOE(S): Chronic | ICD-10-CM

## 2021-11-16 DIAGNOSIS — Z98.1 ARTHRODESIS STATUS: Chronic | ICD-10-CM

## 2021-11-16 DIAGNOSIS — L97.522 NON-PRESSURE CHRONIC ULCER OF OTHER PART OF LEFT FOOT WITH FAT LAYER EXPOSED: ICD-10-CM

## 2021-11-19 ENCOUNTER — OUTPATIENT (OUTPATIENT)
Dept: OUTPATIENT SERVICES | Facility: HOSPITAL | Age: 61
LOS: 1 days | End: 2021-11-19
Payer: MEDICARE

## 2021-11-19 DIAGNOSIS — Z89.422 ACQUIRED ABSENCE OF OTHER LEFT TOE(S): Chronic | ICD-10-CM

## 2021-11-19 DIAGNOSIS — L97.522 NON-PRESSURE CHRONIC ULCER OF OTHER PART OF LEFT FOOT WITH FAT LAYER EXPOSED: ICD-10-CM

## 2021-11-19 DIAGNOSIS — Z98.1 ARTHRODESIS STATUS: Chronic | ICD-10-CM

## 2021-11-19 PROCEDURE — 82962 GLUCOSE BLOOD TEST: CPT

## 2021-11-19 PROCEDURE — 99183 HYPERBARIC OXYGEN THERAPY: CPT

## 2021-11-19 PROCEDURE — G0277: CPT

## 2021-11-23 DIAGNOSIS — L97.522 NON-PRESSURE CHRONIC ULCER OF OTHER PART OF LEFT FOOT WITH FAT LAYER EXPOSED: ICD-10-CM

## 2022-01-10 ENCOUNTER — OUTPATIENT (OUTPATIENT)
Dept: OUTPATIENT SERVICES | Facility: HOSPITAL | Age: 62
LOS: 1 days | End: 2022-01-10
Payer: MEDICARE

## 2022-01-10 DIAGNOSIS — Z89.422 ACQUIRED ABSENCE OF OTHER LEFT TOE(S): Chronic | ICD-10-CM

## 2022-01-10 DIAGNOSIS — L97.522 NON-PRESSURE CHRONIC ULCER OF OTHER PART OF LEFT FOOT WITH FAT LAYER EXPOSED: ICD-10-CM

## 2022-01-10 DIAGNOSIS — Z98.1 ARTHRODESIS STATUS: Chronic | ICD-10-CM

## 2022-01-10 PROCEDURE — G0463: CPT

## 2022-02-14 ENCOUNTER — OUTPATIENT (OUTPATIENT)
Dept: OUTPATIENT SERVICES | Facility: HOSPITAL | Age: 62
LOS: 1 days | End: 2022-02-14
Payer: MEDICARE

## 2022-02-14 DIAGNOSIS — L97.522 NON-PRESSURE CHRONIC ULCER OF OTHER PART OF LEFT FOOT WITH FAT LAYER EXPOSED: ICD-10-CM

## 2022-02-14 DIAGNOSIS — Z89.422 ACQUIRED ABSENCE OF OTHER LEFT TOE(S): Chronic | ICD-10-CM

## 2022-02-14 DIAGNOSIS — Z98.1 ARTHRODESIS STATUS: Chronic | ICD-10-CM

## 2022-02-14 PROCEDURE — G0463: CPT | Mod: 25

## 2022-02-14 PROCEDURE — 11042 DBRDMT SUBQ TIS 1ST 20SQCM/<: CPT

## 2022-02-25 ENCOUNTER — OUTPATIENT (OUTPATIENT)
Dept: OUTPATIENT SERVICES | Facility: HOSPITAL | Age: 62
LOS: 1 days | End: 2022-02-25
Payer: MEDICARE

## 2022-02-25 DIAGNOSIS — E11.49 TYPE 2 DIABETES MELLITUS WITH OTHER DIABETIC NEUROLOGICAL COMPLICATION: ICD-10-CM

## 2022-02-25 DIAGNOSIS — Z98.1 ARTHRODESIS STATUS: Chronic | ICD-10-CM

## 2022-02-25 DIAGNOSIS — M86.071 ACUTE HEMATOGENOUS OSTEOMYELITIS, RIGHT ANKLE AND FOOT: ICD-10-CM

## 2022-02-25 DIAGNOSIS — L97.512 NON-PRESSURE CHRONIC ULCER OF OTHER PART OF RIGHT FOOT WITH FAT LAYER EXPOSED: ICD-10-CM

## 2022-02-25 DIAGNOSIS — L97.522 NON-PRESSURE CHRONIC ULCER OF OTHER PART OF LEFT FOOT WITH FAT LAYER EXPOSED: ICD-10-CM

## 2022-02-25 DIAGNOSIS — Z89.422 ACQUIRED ABSENCE OF OTHER LEFT TOE(S): Chronic | ICD-10-CM

## 2022-02-25 PROCEDURE — G0463: CPT

## 2022-03-04 ENCOUNTER — OUTPATIENT (OUTPATIENT)
Dept: OUTPATIENT SERVICES | Facility: HOSPITAL | Age: 62
LOS: 1 days | End: 2022-03-04
Payer: MEDICARE

## 2022-03-04 DIAGNOSIS — Z98.1 ARTHRODESIS STATUS: Chronic | ICD-10-CM

## 2022-03-04 DIAGNOSIS — L97.522 NON-PRESSURE CHRONIC ULCER OF OTHER PART OF LEFT FOOT WITH FAT LAYER EXPOSED: ICD-10-CM

## 2022-03-04 DIAGNOSIS — Z89.422 ACQUIRED ABSENCE OF OTHER LEFT TOE(S): Chronic | ICD-10-CM

## 2022-03-04 PROCEDURE — 11042 DBRDMT SUBQ TIS 1ST 20SQCM/<: CPT

## 2022-03-04 PROCEDURE — 11730 AVULSION NAIL PLATE SIMPLE 1: CPT

## 2022-03-14 ENCOUNTER — OUTPATIENT (OUTPATIENT)
Dept: OUTPATIENT SERVICES | Facility: HOSPITAL | Age: 62
LOS: 1 days | End: 2022-03-14
Payer: MEDICARE

## 2022-03-14 DIAGNOSIS — L97.522 NON-PRESSURE CHRONIC ULCER OF OTHER PART OF LEFT FOOT WITH FAT LAYER EXPOSED: ICD-10-CM

## 2022-03-14 DIAGNOSIS — Z89.422 ACQUIRED ABSENCE OF OTHER LEFT TOE(S): Chronic | ICD-10-CM

## 2022-03-14 DIAGNOSIS — M86.071 ACUTE HEMATOGENOUS OSTEOMYELITIS, RIGHT ANKLE AND FOOT: ICD-10-CM

## 2022-03-14 DIAGNOSIS — Z98.1 ARTHRODESIS STATUS: Chronic | ICD-10-CM

## 2022-03-14 DIAGNOSIS — L97.512 NON-PRESSURE CHRONIC ULCER OF OTHER PART OF RIGHT FOOT WITH FAT LAYER EXPOSED: ICD-10-CM

## 2022-03-14 DIAGNOSIS — E11.49 TYPE 2 DIABETES MELLITUS WITH OTHER DIABETIC NEUROLOGICAL COMPLICATION: ICD-10-CM

## 2022-03-14 PROCEDURE — 11042 DBRDMT SUBQ TIS 1ST 20SQCM/<: CPT

## 2022-03-21 ENCOUNTER — OUTPATIENT (OUTPATIENT)
Dept: OUTPATIENT SERVICES | Facility: HOSPITAL | Age: 62
LOS: 1 days | End: 2022-03-21
Payer: MEDICARE

## 2022-03-21 DIAGNOSIS — L97.512 NON-PRESSURE CHRONIC ULCER OF OTHER PART OF RIGHT FOOT WITH FAT LAYER EXPOSED: ICD-10-CM

## 2022-03-21 DIAGNOSIS — Z89.422 ACQUIRED ABSENCE OF OTHER LEFT TOE(S): Chronic | ICD-10-CM

## 2022-03-21 DIAGNOSIS — L97.522 NON-PRESSURE CHRONIC ULCER OF OTHER PART OF LEFT FOOT WITH FAT LAYER EXPOSED: ICD-10-CM

## 2022-03-21 DIAGNOSIS — E11.49 TYPE 2 DIABETES MELLITUS WITH OTHER DIABETIC NEUROLOGICAL COMPLICATION: ICD-10-CM

## 2022-03-21 DIAGNOSIS — M86.071 ACUTE HEMATOGENOUS OSTEOMYELITIS, RIGHT ANKLE AND FOOT: ICD-10-CM

## 2022-03-21 DIAGNOSIS — Z98.1 ARTHRODESIS STATUS: Chronic | ICD-10-CM

## 2022-03-21 PROCEDURE — 11042 DBRDMT SUBQ TIS 1ST 20SQCM/<: CPT

## 2022-04-01 ENCOUNTER — OUTPATIENT (OUTPATIENT)
Dept: OUTPATIENT SERVICES | Facility: HOSPITAL | Age: 62
LOS: 1 days | End: 2022-04-01
Payer: MEDICARE

## 2022-04-01 DIAGNOSIS — E11.49 TYPE 2 DIABETES MELLITUS WITH OTHER DIABETIC NEUROLOGICAL COMPLICATION: ICD-10-CM

## 2022-04-01 DIAGNOSIS — M86.071 ACUTE HEMATOGENOUS OSTEOMYELITIS, RIGHT ANKLE AND FOOT: ICD-10-CM

## 2022-04-01 DIAGNOSIS — A49.02 METHICILLIN RESISTANT STAPHYLOCOCCUS AUREUS INFECTION, UNSPECIFIED SITE: ICD-10-CM

## 2022-04-01 DIAGNOSIS — L97.512 NON-PRESSURE CHRONIC ULCER OF OTHER PART OF RIGHT FOOT WITH FAT LAYER EXPOSED: ICD-10-CM

## 2022-04-01 DIAGNOSIS — L97.522 NON-PRESSURE CHRONIC ULCER OF OTHER PART OF LEFT FOOT WITH FAT LAYER EXPOSED: ICD-10-CM

## 2022-04-01 DIAGNOSIS — Z98.1 ARTHRODESIS STATUS: Chronic | ICD-10-CM

## 2022-04-01 DIAGNOSIS — Z89.422 ACQUIRED ABSENCE OF OTHER LEFT TOE(S): Chronic | ICD-10-CM

## 2022-04-01 PROCEDURE — G0463: CPT

## 2022-04-11 ENCOUNTER — OUTPATIENT (OUTPATIENT)
Dept: OUTPATIENT SERVICES | Facility: HOSPITAL | Age: 62
LOS: 1 days | End: 2022-04-11
Payer: MEDICARE

## 2022-04-11 DIAGNOSIS — M86.071 ACUTE HEMATOGENOUS OSTEOMYELITIS, RIGHT ANKLE AND FOOT: ICD-10-CM

## 2022-04-11 DIAGNOSIS — Z89.422 ACQUIRED ABSENCE OF OTHER LEFT TOE(S): Chronic | ICD-10-CM

## 2022-04-11 DIAGNOSIS — E11.49 TYPE 2 DIABETES MELLITUS WITH OTHER DIABETIC NEUROLOGICAL COMPLICATION: ICD-10-CM

## 2022-04-11 DIAGNOSIS — L97.512 NON-PRESSURE CHRONIC ULCER OF OTHER PART OF RIGHT FOOT WITH FAT LAYER EXPOSED: ICD-10-CM

## 2022-04-11 DIAGNOSIS — L97.522 NON-PRESSURE CHRONIC ULCER OF OTHER PART OF LEFT FOOT WITH FAT LAYER EXPOSED: ICD-10-CM

## 2022-04-11 DIAGNOSIS — Z98.1 ARTHRODESIS STATUS: Chronic | ICD-10-CM

## 2022-04-11 PROCEDURE — 11042 DBRDMT SUBQ TIS 1ST 20SQCM/<: CPT

## 2022-04-18 ENCOUNTER — OUTPATIENT (OUTPATIENT)
Dept: OUTPATIENT SERVICES | Facility: HOSPITAL | Age: 62
LOS: 1 days | End: 2022-04-18
Payer: MEDICARE

## 2022-04-18 DIAGNOSIS — E11.49 TYPE 2 DIABETES MELLITUS WITH OTHER DIABETIC NEUROLOGICAL COMPLICATION: ICD-10-CM

## 2022-04-18 DIAGNOSIS — L97.512 NON-PRESSURE CHRONIC ULCER OF OTHER PART OF RIGHT FOOT WITH FAT LAYER EXPOSED: ICD-10-CM

## 2022-04-18 DIAGNOSIS — L97.522 NON-PRESSURE CHRONIC ULCER OF OTHER PART OF LEFT FOOT WITH FAT LAYER EXPOSED: ICD-10-CM

## 2022-04-18 DIAGNOSIS — M86.071 ACUTE HEMATOGENOUS OSTEOMYELITIS, RIGHT ANKLE AND FOOT: ICD-10-CM

## 2022-04-18 DIAGNOSIS — A49.02 METHICILLIN RESISTANT STAPHYLOCOCCUS AUREUS INFECTION, UNSPECIFIED SITE: ICD-10-CM

## 2022-04-18 DIAGNOSIS — Z89.422 ACQUIRED ABSENCE OF OTHER LEFT TOE(S): Chronic | ICD-10-CM

## 2022-04-18 DIAGNOSIS — Z98.1 ARTHRODESIS STATUS: Chronic | ICD-10-CM

## 2022-04-18 PROCEDURE — 11043 DBRDMT MUSC&/FSCA 1ST 20/<: CPT

## 2022-04-18 RX ORDER — AZTREONAM 2 G
1 VIAL (EA) INJECTION
Qty: 0 | Refills: 0 | DISCHARGE
Start: 2022-04-18

## 2022-04-25 ENCOUNTER — OUTPATIENT (OUTPATIENT)
Dept: OUTPATIENT SERVICES | Facility: HOSPITAL | Age: 62
LOS: 1 days | End: 2022-04-25

## 2022-04-25 ENCOUNTER — INPATIENT (INPATIENT)
Facility: HOSPITAL | Age: 62
LOS: 4 days | Discharge: HOME HEALTH | DRG: 617 | End: 2022-04-30
Attending: FAMILY MEDICINE | Admitting: FAMILY MEDICINE
Payer: MEDICARE

## 2022-04-25 VITALS — HEIGHT: 65 IN | WEIGHT: 186.07 LBS

## 2022-04-25 DIAGNOSIS — Z98.1 ARTHRODESIS STATUS: Chronic | ICD-10-CM

## 2022-04-25 DIAGNOSIS — E11.621 TYPE 2 DIABETES MELLITUS WITH FOOT ULCER: ICD-10-CM

## 2022-04-25 DIAGNOSIS — Z89.422 ACQUIRED ABSENCE OF OTHER LEFT TOE(S): Chronic | ICD-10-CM

## 2022-04-25 DIAGNOSIS — L97.522 NON-PRESSURE CHRONIC ULCER OF OTHER PART OF LEFT FOOT WITH FAT LAYER EXPOSED: ICD-10-CM

## 2022-04-25 LAB
ALBUMIN SERPL ELPH-MCNC: 3.1 G/DL — LOW (ref 3.3–5)
ALP SERPL-CCNC: 112 U/L — SIGNIFICANT CHANGE UP (ref 40–120)
ALT FLD-CCNC: 23 U/L — SIGNIFICANT CHANGE UP (ref 12–78)
ANION GAP SERPL CALC-SCNC: 7 MMOL/L — SIGNIFICANT CHANGE UP (ref 5–17)
APPEARANCE UR: CLEAR — SIGNIFICANT CHANGE UP
APTT BLD: 31.6 SEC — SIGNIFICANT CHANGE UP (ref 27.5–35.5)
AST SERPL-CCNC: 14 U/L — LOW (ref 15–37)
BASOPHILS # BLD AUTO: 0.05 K/UL — SIGNIFICANT CHANGE UP (ref 0–0.2)
BASOPHILS NFR BLD AUTO: 0.8 % — SIGNIFICANT CHANGE UP (ref 0–2)
BILIRUB SERPL-MCNC: 0.2 MG/DL — SIGNIFICANT CHANGE UP (ref 0.2–1.2)
BILIRUB UR-MCNC: NEGATIVE — SIGNIFICANT CHANGE UP
BUN SERPL-MCNC: 8 MG/DL — SIGNIFICANT CHANGE UP (ref 7–23)
CALCIUM SERPL-MCNC: 9.7 MG/DL — SIGNIFICANT CHANGE UP (ref 8.5–10.1)
CHLORIDE SERPL-SCNC: 105 MMOL/L — SIGNIFICANT CHANGE UP (ref 96–108)
CO2 SERPL-SCNC: 27 MMOL/L — SIGNIFICANT CHANGE UP (ref 22–31)
COLOR SPEC: YELLOW — SIGNIFICANT CHANGE UP
CREAT SERPL-MCNC: 0.86 MG/DL — SIGNIFICANT CHANGE UP (ref 0.5–1.3)
DIFF PNL FLD: NEGATIVE — SIGNIFICANT CHANGE UP
EGFR: 78 ML/MIN/1.73M2 — SIGNIFICANT CHANGE UP
EOSINOPHIL # BLD AUTO: 0.15 K/UL — SIGNIFICANT CHANGE UP (ref 0–0.5)
EOSINOPHIL NFR BLD AUTO: 2.3 % — SIGNIFICANT CHANGE UP (ref 0–6)
ERYTHROCYTE [SEDIMENTATION RATE] IN BLOOD: 91 MM/HR — HIGH (ref 0–20)
GLUCOSE SERPL-MCNC: 111 MG/DL — HIGH (ref 70–99)
GLUCOSE UR QL: NEGATIVE — SIGNIFICANT CHANGE UP
HCT VFR BLD CALC: 33.4 % — LOW (ref 34.5–45)
HGB BLD-MCNC: 10.5 G/DL — LOW (ref 11.5–15.5)
IMM GRANULOCYTES NFR BLD AUTO: 0.2 % — SIGNIFICANT CHANGE UP (ref 0–1.5)
INR BLD: 1.09 RATIO — SIGNIFICANT CHANGE UP (ref 0.88–1.16)
KETONES UR-MCNC: NEGATIVE — SIGNIFICANT CHANGE UP
LACTATE SERPL-SCNC: 0.8 MMOL/L — SIGNIFICANT CHANGE UP (ref 0.7–2)
LEUKOCYTE ESTERASE UR-ACNC: NEGATIVE — SIGNIFICANT CHANGE UP
LYMPHOCYTES # BLD AUTO: 2.12 K/UL — SIGNIFICANT CHANGE UP (ref 1–3.3)
LYMPHOCYTES # BLD AUTO: 33 % — SIGNIFICANT CHANGE UP (ref 13–44)
MCHC RBC-ENTMCNC: 24.8 PG — LOW (ref 27–34)
MCHC RBC-ENTMCNC: 31.4 GM/DL — LOW (ref 32–36)
MCV RBC AUTO: 79 FL — LOW (ref 80–100)
MONOCYTES # BLD AUTO: 0.8 K/UL — SIGNIFICANT CHANGE UP (ref 0–0.9)
MONOCYTES NFR BLD AUTO: 12.4 % — SIGNIFICANT CHANGE UP (ref 2–14)
NEUTROPHILS # BLD AUTO: 3.3 K/UL — SIGNIFICANT CHANGE UP (ref 1.8–7.4)
NEUTROPHILS NFR BLD AUTO: 51.3 % — SIGNIFICANT CHANGE UP (ref 43–77)
NITRITE UR-MCNC: NEGATIVE — SIGNIFICANT CHANGE UP
PH UR: 7 — SIGNIFICANT CHANGE UP (ref 5–8)
PLATELET # BLD AUTO: 467 K/UL — HIGH (ref 150–400)
POTASSIUM SERPL-MCNC: 4.2 MMOL/L — SIGNIFICANT CHANGE UP (ref 3.5–5.3)
POTASSIUM SERPL-SCNC: 4.2 MMOL/L — SIGNIFICANT CHANGE UP (ref 3.5–5.3)
PROT SERPL-MCNC: 8.7 GM/DL — HIGH (ref 6–8.3)
PROT UR-MCNC: NEGATIVE — SIGNIFICANT CHANGE UP
PROTHROM AB SERPL-ACNC: 12.6 SEC — SIGNIFICANT CHANGE UP (ref 10.5–13.4)
RBC # BLD: 4.23 M/UL — SIGNIFICANT CHANGE UP (ref 3.8–5.2)
RBC # FLD: 14.3 % — SIGNIFICANT CHANGE UP (ref 10.3–14.5)
SARS-COV-2 RNA SPEC QL NAA+PROBE: SIGNIFICANT CHANGE UP
SODIUM SERPL-SCNC: 139 MMOL/L — SIGNIFICANT CHANGE UP (ref 135–145)
SP GR SPEC: 1 — LOW (ref 1.01–1.02)
UROBILINOGEN FLD QL: NEGATIVE — SIGNIFICANT CHANGE UP
WBC # BLD: 6.43 K/UL — SIGNIFICANT CHANGE UP (ref 3.8–10.5)
WBC # FLD AUTO: 6.43 K/UL — SIGNIFICANT CHANGE UP (ref 3.8–10.5)

## 2022-04-25 PROCEDURE — 88305 TISSUE EXAM BY PATHOLOGIST: CPT

## 2022-04-25 PROCEDURE — 71045 X-RAY EXAM CHEST 1 VIEW: CPT

## 2022-04-25 PROCEDURE — 99223 1ST HOSP IP/OBS HIGH 75: CPT

## 2022-04-25 PROCEDURE — 93971 EXTREMITY STUDY: CPT | Mod: RT

## 2022-04-25 PROCEDURE — 88304 TISSUE EXAM BY PATHOLOGIST: CPT

## 2022-04-25 PROCEDURE — 80048 BASIC METABOLIC PNL TOTAL CA: CPT

## 2022-04-25 PROCEDURE — 87206 SMEAR FLUORESCENT/ACID STAI: CPT

## 2022-04-25 PROCEDURE — 93971 EXTREMITY STUDY: CPT | Mod: 26,RT

## 2022-04-25 PROCEDURE — 87116 MYCOBACTERIA CULTURE: CPT

## 2022-04-25 PROCEDURE — 87075 CULTR BACTERIA EXCEPT BLOOD: CPT

## 2022-04-25 PROCEDURE — 71045 X-RAY EXAM CHEST 1 VIEW: CPT | Mod: 26

## 2022-04-25 PROCEDURE — 87015 SPECIMEN INFECT AGNT CONCNTJ: CPT

## 2022-04-25 PROCEDURE — 85027 COMPLETE CBC AUTOMATED: CPT

## 2022-04-25 PROCEDURE — 80202 ASSAY OF VANCOMYCIN: CPT

## 2022-04-25 PROCEDURE — 85025 COMPLETE CBC W/AUTO DIFF WBC: CPT

## 2022-04-25 PROCEDURE — 73660 X-RAY EXAM OF TOE(S): CPT | Mod: RT

## 2022-04-25 PROCEDURE — 83036 HEMOGLOBIN GLYCOSYLATED A1C: CPT

## 2022-04-25 PROCEDURE — 36569 INSJ PICC 5 YR+ W/O IMAGING: CPT

## 2022-04-25 PROCEDURE — 87070 CULTURE OTHR SPECIMN AEROBIC: CPT

## 2022-04-25 PROCEDURE — 73630 X-RAY EXAM OF FOOT: CPT | Mod: RT

## 2022-04-25 PROCEDURE — 87077 CULTURE AEROBIC IDENTIFY: CPT

## 2022-04-25 PROCEDURE — 84100 ASSAY OF PHOSPHORUS: CPT

## 2022-04-25 PROCEDURE — 94640 AIRWAY INHALATION TREATMENT: CPT

## 2022-04-25 PROCEDURE — 86140 C-REACTIVE PROTEIN: CPT

## 2022-04-25 PROCEDURE — 81003 URINALYSIS AUTO W/O SCOPE: CPT

## 2022-04-25 PROCEDURE — 87086 URINE CULTURE/COLONY COUNT: CPT

## 2022-04-25 PROCEDURE — 93010 ELECTROCARDIOGRAM REPORT: CPT

## 2022-04-25 PROCEDURE — 73720 MRI LWR EXTREMITY W/O&W/DYE: CPT | Mod: RT

## 2022-04-25 PROCEDURE — 87186 SC STD MICRODIL/AGAR DIL: CPT

## 2022-04-25 PROCEDURE — 93005 ELECTROCARDIOGRAM TRACING: CPT

## 2022-04-25 PROCEDURE — 99285 EMERGENCY DEPT VISIT HI MDM: CPT

## 2022-04-25 PROCEDURE — A9579: CPT

## 2022-04-25 PROCEDURE — C1751: CPT

## 2022-04-25 PROCEDURE — 88311 DECALCIFY TISSUE: CPT

## 2022-04-25 PROCEDURE — 36415 COLL VENOUS BLD VENIPUNCTURE: CPT

## 2022-04-25 PROCEDURE — 87102 FUNGUS ISOLATION CULTURE: CPT

## 2022-04-25 PROCEDURE — 82962 GLUCOSE BLOOD TEST: CPT

## 2022-04-25 PROCEDURE — 80061 LIPID PANEL: CPT

## 2022-04-25 PROCEDURE — 73660 X-RAY EXAM OF TOE(S): CPT | Mod: 26,RT

## 2022-04-25 PROCEDURE — 85652 RBC SED RATE AUTOMATED: CPT

## 2022-04-25 RX ORDER — DEXTROSE 50 % IN WATER 50 %
15 SYRINGE (ML) INTRAVENOUS ONCE
Refills: 0 | Status: DISCONTINUED | OUTPATIENT
Start: 2022-04-25 | End: 2022-04-28

## 2022-04-25 RX ORDER — ACETAMINOPHEN 500 MG
650 TABLET ORAL EVERY 6 HOURS
Refills: 0 | Status: DISCONTINUED | OUTPATIENT
Start: 2022-04-25 | End: 2022-04-28

## 2022-04-25 RX ORDER — GABAPENTIN 400 MG/1
300 CAPSULE ORAL ONCE
Refills: 0 | Status: COMPLETED | OUTPATIENT
Start: 2022-04-25 | End: 2022-04-25

## 2022-04-25 RX ORDER — LISINOPRIL 2.5 MG/1
10 TABLET ORAL DAILY
Refills: 0 | Status: DISCONTINUED | OUTPATIENT
Start: 2022-04-25 | End: 2022-04-28

## 2022-04-25 RX ORDER — INSULIN LISPRO 100/ML
VIAL (ML) SUBCUTANEOUS
Refills: 0 | Status: DISCONTINUED | OUTPATIENT
Start: 2022-04-25 | End: 2022-04-28

## 2022-04-25 RX ORDER — INSULIN GLARGINE 100 [IU]/ML
20 INJECTION, SOLUTION SUBCUTANEOUS AT BEDTIME
Refills: 0 | Status: DISCONTINUED | OUTPATIENT
Start: 2022-04-25 | End: 2022-04-28

## 2022-04-25 RX ORDER — ROSUVASTATIN CALCIUM 5 MG/1
1 TABLET ORAL
Qty: 0 | Refills: 0 | DISCHARGE

## 2022-04-25 RX ORDER — MOMETASONE FUROATE 50 UG/1
2 SPRAY NASAL
Qty: 0 | Refills: 0 | DISCHARGE

## 2022-04-25 RX ORDER — SEMAGLUTIDE 0.68 MG/ML
1 INJECTION, SOLUTION SUBCUTANEOUS
Qty: 0 | Refills: 0 | DISCHARGE

## 2022-04-25 RX ORDER — ACETAMINOPHEN 500 MG
650 TABLET ORAL ONCE
Refills: 0 | Status: COMPLETED | OUTPATIENT
Start: 2022-04-25 | End: 2022-04-25

## 2022-04-25 RX ORDER — DEXTROSE 50 % IN WATER 50 %
25 SYRINGE (ML) INTRAVENOUS ONCE
Refills: 0 | Status: DISCONTINUED | OUTPATIENT
Start: 2022-04-25 | End: 2022-04-28

## 2022-04-25 RX ORDER — LEVOTHYROXINE SODIUM 125 MCG
1 TABLET ORAL
Qty: 0 | Refills: 0 | DISCHARGE

## 2022-04-25 RX ORDER — ATORVASTATIN CALCIUM 80 MG/1
40 TABLET, FILM COATED ORAL AT BEDTIME
Refills: 0 | Status: DISCONTINUED | OUTPATIENT
Start: 2022-04-25 | End: 2022-04-28

## 2022-04-25 RX ORDER — LORATADINE 10 MG/1
10 TABLET ORAL DAILY
Refills: 0 | Status: DISCONTINUED | OUTPATIENT
Start: 2022-04-25 | End: 2022-04-28

## 2022-04-25 RX ORDER — SODIUM CHLORIDE 9 MG/ML
1000 INJECTION, SOLUTION INTRAVENOUS
Refills: 0 | Status: DISCONTINUED | OUTPATIENT
Start: 2022-04-25 | End: 2022-04-28

## 2022-04-25 RX ORDER — ENOXAPARIN SODIUM 100 MG/ML
20 INJECTION SUBCUTANEOUS
Qty: 0 | Refills: 0 | DISCHARGE

## 2022-04-25 RX ORDER — OXYMETAZOLINE HYDROCHLORIDE 0.5 MG/ML
1 SPRAY NASAL
Qty: 0 | Refills: 0 | DISCHARGE

## 2022-04-25 RX ORDER — DEXTROSE 50 % IN WATER 50 %
12.5 SYRINGE (ML) INTRAVENOUS ONCE
Refills: 0 | Status: DISCONTINUED | OUTPATIENT
Start: 2022-04-25 | End: 2022-04-28

## 2022-04-25 RX ORDER — GABAPENTIN 400 MG/1
1 CAPSULE ORAL
Qty: 0 | Refills: 0 | DISCHARGE

## 2022-04-25 RX ORDER — GLUCAGON INJECTION, SOLUTION 0.5 MG/.1ML
1 INJECTION, SOLUTION SUBCUTANEOUS ONCE
Refills: 0 | Status: DISCONTINUED | OUTPATIENT
Start: 2022-04-25 | End: 2022-04-28

## 2022-04-25 RX ORDER — INSULIN LISPRO 100/ML
VIAL (ML) SUBCUTANEOUS AT BEDTIME
Refills: 0 | Status: DISCONTINUED | OUTPATIENT
Start: 2022-04-25 | End: 2022-04-28

## 2022-04-25 RX ORDER — METFORMIN HYDROCHLORIDE 850 MG/1
1 TABLET ORAL
Qty: 0 | Refills: 0 | DISCHARGE

## 2022-04-25 RX ORDER — INSULIN GLARGINE 100 [IU]/ML
15 INJECTION, SOLUTION SUBCUTANEOUS ONCE
Refills: 0 | Status: COMPLETED | OUTPATIENT
Start: 2022-04-25 | End: 2022-04-25

## 2022-04-25 RX ORDER — BENAZEPRIL HYDROCHLORIDE 40 MG/1
1 TABLET ORAL
Qty: 0 | Refills: 0 | DISCHARGE

## 2022-04-25 RX ORDER — INSULIN LISPRO 100/ML
0 VIAL (ML) SUBCUTANEOUS
Qty: 0 | Refills: 0 | DISCHARGE

## 2022-04-25 RX ORDER — FLUTICASONE PROPIONATE 50 MCG
2 SPRAY, SUSPENSION NASAL
Refills: 0 | Status: DISCONTINUED | OUTPATIENT
Start: 2022-04-25 | End: 2022-04-28

## 2022-04-25 RX ORDER — ENOXAPARIN SODIUM 100 MG/ML
26 INJECTION SUBCUTANEOUS
Qty: 0 | Refills: 0 | DISCHARGE

## 2022-04-25 RX ORDER — LORATADINE 10 MG/1
1 TABLET ORAL
Qty: 0 | Refills: 0 | DISCHARGE

## 2022-04-25 RX ORDER — ASPIRIN/CALCIUM CARB/MAGNESIUM 324 MG
81 TABLET ORAL DAILY
Refills: 0 | Status: DISCONTINUED | OUTPATIENT
Start: 2022-04-25 | End: 2022-04-28

## 2022-04-25 RX ORDER — ASPIRIN/CALCIUM CARB/MAGNESIUM 324 MG
1 TABLET ORAL
Qty: 0 | Refills: 0 | DISCHARGE

## 2022-04-25 RX ORDER — ACETAMINOPHEN 500 MG
2 TABLET ORAL
Qty: 0 | Refills: 0 | DISCHARGE

## 2022-04-25 RX ORDER — INSULIN LISPRO 100/ML
4 VIAL (ML) SUBCUTANEOUS
Qty: 0 | Refills: 0 | DISCHARGE

## 2022-04-25 RX ORDER — GABAPENTIN 400 MG/1
300 CAPSULE ORAL
Refills: 0 | Status: DISCONTINUED | OUTPATIENT
Start: 2022-04-25 | End: 2022-04-28

## 2022-04-25 RX ADMIN — GABAPENTIN 300 MILLIGRAM(S): 400 CAPSULE ORAL at 21:28

## 2022-04-25 RX ADMIN — Medication 650 MILLIGRAM(S): at 21:29

## 2022-04-25 NOTE — ED STATDOCS - PROGRESS NOTE DETAILS
Patient seen and evaluated, ED attending note and orders reviewed, will continue with patient follow up and care -Apolonia Forbes PA-C labs WNL, pt to go to the OR with Dr. Bermudez tmrw, will admit to medicine for further evaluation   Apolonia Forbes PA-C

## 2022-04-25 NOTE — ED ADULT TRIAGE NOTE - CHIEF COMPLAINT QUOTE
patient sent by Dr. Bermudez for right 2nd toe infection.  patient seen in wound center, sent for IV antibiotics.

## 2022-04-25 NOTE — H&P ADULT - EXTREMITIES COMMENTS
R foot second toe ulcer, with surrounding edema, warmth, drainage. Dressing by podiatry.   R calf discomfort reported by pt.

## 2022-04-25 NOTE — H&P ADULT - NSHPPHYSICALEXAM_GEN_ALL_CORE
ICU Vital Signs Last 24 Hrs  T(C): 37.1 (25 Apr 2022 15:47), Max: 37.1 (25 Apr 2022 15:47)  T(F): 98.7 (25 Apr 2022 15:47), Max: 98.7 (25 Apr 2022 15:47)  HR: 92 (25 Apr 2022 15:47) (92 - 92)  BP: 138/60 (25 Apr 2022 15:47) (138/60 - 138/60)  BP(mean): 80 (25 Apr 2022 15:47) (80 - 80)    RR: 17 (25 Apr 2022 15:47) (17 - 17)  SpO2: 98% (25 Apr 2022 15:47) (98% - 98%)

## 2022-04-25 NOTE — CONSULT NOTE ADULT - ASSESSMENT
Assessment: 60 y/o female seen and examined for the following  - Right 2nd digit full thickness ulceration  - Om of Right 2nd toe  - Pain to right foot   - Difficulty with ambulation      Plan:  - Pt evaluated and chart reviewed.  - Reviewed the imaging and available albs  - Discussed the potential causes and plan of management with the Pt.  - Discussed all the different aspects of treatment with the Pt including conservative and surgical options.  - Pt agreed to  treatment plan  - Betadine and DSD applied to right 2nd digit   - x-rays reviewed no evidence of ST emphysema on wet read  - will obtain MRI of right foot  - Recommended ID consult.  - Pt scheduled for OR on thursday 4/28/22  - Please have pt optimized and clearance stated in chart, appreciated   - All additional care per medicine, appreciated  - Podiatry will follow

## 2022-04-25 NOTE — ED STATDOCS - OBJECTIVE STATEMENT
61 F hx DM HTN presents with right second toe pain. pt has been seen by Dr. Bermudez in the last year and had a partial great toe amputation. pain increased over last couple of weeks and has had some discharge. pt currently on Bactrim. no fever. no recent trauma. non smoker. allergies to Sulfur

## 2022-04-25 NOTE — PHARMACOTHERAPY INTERVENTION NOTE - COMMENTS
Medication reconciliation completed.  Reviewed Medication list and confirmed med allergies with patient; confirmed with Dr. Horvath Med[facility].  Pt confirms that she had been prescribed 2 Sulfa drugs by Dr. Bermudez, Bactrim and Silvadene cream and is complaining of itching and hives.  Pt due for Ozempic 1mg dose today and states that she was supposed to meet her Provider to lower the dose due to nausea/vomiting from the Ozempic.

## 2022-04-25 NOTE — H&P ADULT - HISTORY OF PRESENT ILLNESS
Pt is a pleasant 62 y/o Female with PM hx DM, HTN, MRSA,  R great toe amputation who presented to [place] ED with right second toe pain. She reports pain increased over last couple of weeks and has had some discharge.       pt currently on Bactrim. no fever. no recent trauma. non smoker.     allergies to Sulfur Pt is a pleasant 62 y/o Female with PM hx DM, HTN, MRSA, Left second toe amputation, R great toe partial amputation who presented to [place] ED with right second toe pain. She reports pain increased over last couple of weeks and has had some discharge.  Pt reports swelling of right lower leg.       Pt has been on Bactrim.  She denies fever, no cpain/SOB,  no recent trauma, no n/v/d,  no abd pain,  no urinary complaints.   She  havea fine hive type reaction with itching to the left upper forearm,  which is improved now.      allergies to Sulfur include a HIVES type reaction. Pt is a pleasant 62 y/o Female with PM hx DM, HTN on ASA 81mg, MRSA, Left second toe amputation, R great toe partial amputation who presented to [place] ED with right second toe pain. She reports pain increased over last couple of weeks and has had some discharge.  Pt reports swelling of right lower leg.       Pt has been on Bactrim.  She denies fever, no cpain/SOB,  no recent trauma, no n/v/d,  no abd pain,  no urinary complaints.   She  havea fine hive type reaction with itching to the left upper forearm,  which is improved now.      allergies to Sulfur include a HIVES type reaction.

## 2022-04-25 NOTE — H&P ADULT - ASSESSMENT
Pt is admitted w/    Right second toe Diabetic Ulcer  DDX Osteomyelitis  Right foot cellulitis  Hx of MRSA infection  Thrombocytosis, ESR 91  DM II , last HbA1C  was 10   ( Jan or Feb 2022)  - s/p Bactrim BID at home (last dose 4/24/22) , will discont due to Sulfa allergy and add doxycycline  - apprec podiatry consult in the ED  - s/p wound culture by podiatry  - MRI foot ordered by podiatry  - blood cx  - hold oral DM meds  - cont Insulin sliding scale and Lantus 20 HS,  Osimpic 1mg weekly is non-formulary at [facility]  - pt is optimized for surgery and cleared .  Recommend nightly Lantus insulin to be reduced to 10 units the night before the procedure.   - physical therapy  - DVT proph: lovenox Pt is admitted w/    Right second toe Diabetic Ulcer  DDX Osteomyelitis  Right foot cellulitis  Hx of MRSA infection  Thrombocytosis, ESR 91  DM II , last HbA1C  was 10   ( Jan or Feb 2022)  - s/p Bactrim BID at home (last dose 4/24/22) , will discont due to Sulfa allergy and add doxycycline  - apprec podiatry consult in the ED  - s/p wound culture by podiatry  - MRI foot ordered by podiatry  - ID consult  - blood cx  - hold oral DM meds  - cont Insulin sliding scale and Lantus 20 HS,  Osimpic 1mg weekly is non-formulary at [facility]  - pt is optimized for surgery and cleared with revised cardiac risk of 6% due to Insulin Dependent Diabetes.   Recommend nightly Lantus insulin to be reduced to 10 units the night before the procedure.   - physical therapy  - DVT proph: lovenox Pt is admitted w/    Right second toe Diabetic Ulcer  DDX Osteomyelitis  Right foot cellulitis  Hx of MRSA infection  Thrombocytosis, ESR 91  DM II , last HbA1C  was 10   ( Jan or Feb 2022)  - s/p Bactrim BID at home (last dose 4/24/22) , will discont due to Sulfa allergy and add doxycycline  - apprec podiatry consult in the ED  - s/p wound culture by podiatry  - MRI foot to be reviewed and ordered by podiatry  - ID consult  - blood cx  - RLE dupplex is neg for DVT  - hold oral DM meds  - pt on ASA 81mg  - cont Insulin sliding scale and Lantus 20 HS,  Osimpic 1mg weekly is non-formulary at [facility]  - pt is optimized for surgery and cleared with revised cardiac risk of 6% due to Insulin Dependent Diabetes.   Recommend nightly Lantus insulin to be reduced to 10 units the night before the procedure.   - physical therapy  - DVT proph: lovenox

## 2022-04-25 NOTE — ED STATDOCS - ATTENDING APP SHARED VISIT CONTRIBUTION OF CARE
I personally saw the patient with the EVER, and completed the key components of the history and physical exam. I then discussed the management plan with the EVER.

## 2022-04-25 NOTE — ED STATDOCS - NS ED ATTENDING STATEMENT MOD
This was a shared visit with the EVER. I reviewed and verified the documentation and independently performed the documented:

## 2022-04-26 DIAGNOSIS — L97.522 NON-PRESSURE CHRONIC ULCER OF OTHER PART OF LEFT FOOT WITH FAT LAYER EXPOSED: ICD-10-CM

## 2022-04-26 LAB
A1C WITH ESTIMATED AVERAGE GLUCOSE RESULT: 8.7 % — HIGH (ref 4–5.6)
ANION GAP SERPL CALC-SCNC: 3 MMOL/L — LOW (ref 5–17)
BUN SERPL-MCNC: 14 MG/DL — SIGNIFICANT CHANGE UP (ref 7–23)
CALCIUM SERPL-MCNC: 9.4 MG/DL — SIGNIFICANT CHANGE UP (ref 8.5–10.1)
CHLORIDE SERPL-SCNC: 107 MMOL/L — SIGNIFICANT CHANGE UP (ref 96–108)
CHOLEST SERPL-MCNC: 105 MG/DL — SIGNIFICANT CHANGE UP
CO2 SERPL-SCNC: 26 MMOL/L — SIGNIFICANT CHANGE UP (ref 22–31)
CREAT SERPL-MCNC: 0.77 MG/DL — SIGNIFICANT CHANGE UP (ref 0.5–1.3)
CRP SERPL-MCNC: 63 MG/L — HIGH
EGFR: 89 ML/MIN/1.73M2 — SIGNIFICANT CHANGE UP
ESTIMATED AVERAGE GLUCOSE: 203 MG/DL — HIGH (ref 68–114)
GLUCOSE SERPL-MCNC: 216 MG/DL — HIGH (ref 70–99)
HCT VFR BLD CALC: 32.4 % — LOW (ref 34.5–45)
HDLC SERPL-MCNC: 32 MG/DL — LOW
HGB BLD-MCNC: 10 G/DL — LOW (ref 11.5–15.5)
LIPID PNL WITH DIRECT LDL SERPL: 58 MG/DL — SIGNIFICANT CHANGE UP
MCHC RBC-ENTMCNC: 24.7 PG — LOW (ref 27–34)
MCHC RBC-ENTMCNC: 30.9 GM/DL — LOW (ref 32–36)
MCV RBC AUTO: 80 FL — SIGNIFICANT CHANGE UP (ref 80–100)
NON HDL CHOLESTEROL: 74 MG/DL — SIGNIFICANT CHANGE UP
PLATELET # BLD AUTO: 450 K/UL — HIGH (ref 150–400)
POTASSIUM SERPL-MCNC: 4.8 MMOL/L — SIGNIFICANT CHANGE UP (ref 3.5–5.3)
POTASSIUM SERPL-SCNC: 4.8 MMOL/L — SIGNIFICANT CHANGE UP (ref 3.5–5.3)
RBC # BLD: 4.05 M/UL — SIGNIFICANT CHANGE UP (ref 3.8–5.2)
RBC # FLD: 14.4 % — SIGNIFICANT CHANGE UP (ref 10.3–14.5)
SODIUM SERPL-SCNC: 136 MMOL/L — SIGNIFICANT CHANGE UP (ref 135–145)
TRIGL SERPL-MCNC: 82 MG/DL — SIGNIFICANT CHANGE UP
WBC # BLD: 5.37 K/UL — SIGNIFICANT CHANGE UP (ref 3.8–10.5)
WBC # FLD AUTO: 5.37 K/UL — SIGNIFICANT CHANGE UP (ref 3.8–10.5)

## 2022-04-26 PROCEDURE — 73720 MRI LWR EXTREMITY W/O&W/DYE: CPT | Mod: 26,RT

## 2022-04-26 PROCEDURE — 99232 SBSQ HOSP IP/OBS MODERATE 35: CPT

## 2022-04-26 RX ORDER — GABAPENTIN 400 MG/1
300 CAPSULE ORAL ONCE
Refills: 0 | Status: COMPLETED | OUTPATIENT
Start: 2022-04-26 | End: 2022-04-26

## 2022-04-26 RX ORDER — LIDOCAINE 4 G/100G
1 CREAM TOPICAL DAILY
Refills: 0 | Status: DISCONTINUED | OUTPATIENT
Start: 2022-04-26 | End: 2022-04-28

## 2022-04-26 RX ORDER — LIDOCAINE 4 G/100G
1 CREAM TOPICAL ONCE
Refills: 0 | Status: COMPLETED | OUTPATIENT
Start: 2022-04-26 | End: 2022-04-26

## 2022-04-26 RX ADMIN — LIDOCAINE 1 PATCH: 4 CREAM TOPICAL at 21:00

## 2022-04-26 RX ADMIN — LISINOPRIL 10 MILLIGRAM(S): 2.5 TABLET ORAL at 09:45

## 2022-04-26 RX ADMIN — GABAPENTIN 300 MILLIGRAM(S): 400 CAPSULE ORAL at 09:44

## 2022-04-26 RX ADMIN — Medication 1 DROP(S): at 01:28

## 2022-04-26 RX ADMIN — Medication 81 MILLIGRAM(S): at 09:45

## 2022-04-26 RX ADMIN — Medication 650 MILLIGRAM(S): at 06:19

## 2022-04-26 RX ADMIN — Medication 1 TABLET(S): at 01:28

## 2022-04-26 RX ADMIN — LIDOCAINE 1 PATCH: 4 CREAM TOPICAL at 20:00

## 2022-04-26 RX ADMIN — ATORVASTATIN CALCIUM 40 MILLIGRAM(S): 80 TABLET, FILM COATED ORAL at 21:03

## 2022-04-26 RX ADMIN — Medication 1 DROP(S): at 17:37

## 2022-04-26 RX ADMIN — ATORVASTATIN CALCIUM 40 MILLIGRAM(S): 80 TABLET, FILM COATED ORAL at 01:28

## 2022-04-26 RX ADMIN — Medication 1: at 21:10

## 2022-04-26 RX ADMIN — Medication 2: at 12:19

## 2022-04-26 RX ADMIN — Medication 650 MILLIGRAM(S): at 21:12

## 2022-04-26 RX ADMIN — Medication 0: at 01:30

## 2022-04-26 RX ADMIN — Medication 1 DROP(S): at 23:30

## 2022-04-26 RX ADMIN — INSULIN GLARGINE 20 UNIT(S): 100 INJECTION, SOLUTION SUBCUTANEOUS at 21:05

## 2022-04-26 RX ADMIN — Medication 2: at 09:09

## 2022-04-26 RX ADMIN — LIDOCAINE 1 PATCH: 4 CREAM TOPICAL at 09:46

## 2022-04-26 RX ADMIN — Medication 110 MILLIGRAM(S): at 09:45

## 2022-04-26 RX ADMIN — Medication 110 MILLIGRAM(S): at 21:11

## 2022-04-26 RX ADMIN — Medication 1 DROP(S): at 12:19

## 2022-04-26 RX ADMIN — GABAPENTIN 300 MILLIGRAM(S): 400 CAPSULE ORAL at 23:28

## 2022-04-26 RX ADMIN — INSULIN GLARGINE 15 UNIT(S): 100 INJECTION, SOLUTION SUBCUTANEOUS at 01:29

## 2022-04-26 RX ADMIN — LORATADINE 10 MILLIGRAM(S): 10 TABLET ORAL at 09:46

## 2022-04-26 RX ADMIN — GABAPENTIN 300 MILLIGRAM(S): 400 CAPSULE ORAL at 06:44

## 2022-04-26 RX ADMIN — Medication 1 TABLET(S): at 23:28

## 2022-04-26 RX ADMIN — Medication 2: at 17:40

## 2022-04-26 RX ADMIN — LIDOCAINE 1 PATCH: 4 CREAM TOPICAL at 23:27

## 2022-04-26 RX ADMIN — Medication 1 DROP(S): at 06:45

## 2022-04-26 NOTE — DIETITIAN INITIAL EVALUATION ADULT - ORAL INTAKE PTA/DIET HISTORY
Pt reports following a generally healthful diet at home with a good appetite. Per diet recall high CHO meals without protein sources. Pt states appetite x ~1 mo decreased with vomiting 2/2 improper medication dosing, vomiting resolved with improved PO intake in house. Weight x 1 mo ago ~186lbs suspects some weight loss, unclear amount. Pt checks BG ~3x/day. Discussed importance of adequate protein to assist with wound healing and to manage BG, Pt receptive and understanding to education provided.

## 2022-04-26 NOTE — ED ADULT NURSE NOTE - OBJECTIVE STATEMENT
Pt presents to ED c/o toe pain. AAOx3. Respirations even and unlabored, NAD. Skin warm, dry, appropriate.

## 2022-04-26 NOTE — CONSULT NOTE ADULT - SUBJECTIVE AND OBJECTIVE BOX
Date of service: 22  HPI: Pt is a pleasant 60 y/o Female with PM hx DM, HTN, MRSA, well known to Podiatry, with h/o  R great toe amputation who presented to [facility] with right second toe pain. She reports pain increased over last couple of weeks and has had some discharge. She sees Dr. Bermudez at [facility] for regular care and pt currently on Bactrim for right toe wound. Pt reports no fever. no recent trauma. non smoker.       REVIEW OF SYSTEMS:  CONSTITUTIONAL: No fever, chills,  weight loss, or fatigue  EYES: No eye pain, visual disturbances, or discharge  ENMT:  No difficulty hearing, tinnitus, vertigo; No sinus or throat pain  NECK: No pain or stiffness  RESPIRATORY: No cough, wheezing, or hemoptysis; No shortness of breath  CARDIOVASCULAR: No chest pain, palpitations, dizziness, or leg swelling  GASTROINTESTINAL: No abdominal or epigastric pain. No nausea, vomiting, or hematemesis; No diarrhea or constipation. No melena or hematochezia.  GENITOURINARY: No dysuria, frequency, hematuria, or incontinence  NEUROLOGICAL: No headaches, memory loss, loss of strength, numbness, or tremors  SKIN: Rt 2nd toe wound   LYMPH NODES: No enlarged glands  ENDOCRINE: No heat or cold intolerance; No hair loss  MUSCULOSKELETAL: No joint pain or swelling; No muscle, back, or extremity pain  HEME/LYMPH: No easy bruising, or bleeding gums    PAST MEDICAL & SURGICAL HISTORY:  DM (diabetes mellitus)    HTN (hypertension)    HLD (hyperlipidemia)    Toe amputation status, left    H/O ankle fusion        Allergies:  sulfa drugs (Hives; Pruritus)  sulfADIAZINE (Hives)    MEDICATIONS  (STANDING):    MEDICATIONS  (PRN):    SOCIAL HISTORY:    FAMILY HISTORY:  FH: ovarian cancer (Sibling)  FHx: diabetes mellitus (Father)        VITALS:  Vital Signs Last 24 Hrs  T(C): 37.1 (22 @ 20:37), Max: 37.1 (22 @ 15:47)  T(F): 98.8 (22 @ 20:37), Max: 98.8 (22 @ 20:37)  HR: 96 (22 @ 20:37) (92 - 96)  BP: 105/65 (04-25-22 @ 20:37) (105/65 - 138/60)  BP(mean): 83 (22 @ 20:37) (80 - 83)  RR: 14 (22 @ 20:37) (14 - 17)  SpO2: 100% (22 @ 20:37) (98% - 100%)    Physical Examination:  Constitutional: AAOx3, non-focal; NAD, comfortable resting   Focused LE exam:  Vascular: Temperature gradient is warm to cool b/l, palpable pulses, DP/PT is 2/4 b/l, capillary re-fill time is brisk and instantaneous to digits 2-5 on right -edema.   Neuro: Intact protective sensation to the foot and digits 1-5 b/l.  Derm:  Skin is warm, supple, right second digit fibronecrotic wound, with mild purulent driange, + probe to bone, minimal periwound erythema  Musculoskeletal: Manual muscle testing is 5/5 in all muscle groups of the foot/ankle b/l. ROM to all the foot/ankle joints is WNL bilaterally. TTP over right 2nd digit    LABS:                          10.5   6.43  )-----------( 467      ( 2022 16:18 )             33.4           139  |  105  |  8   ----------------------------<  111<H>  4.2   |  27  |  0.86    Ca    9.7      2022 16:18    TPro  8.7<H>  /  Alb  3.1<L>  /  TBili  0.2  /  DBili  x   /  AST  14<L>  /  ALT  23  /  AlkPhos  112        PT/INR - ( 2022 16:18 )   PT: 12.6 sec;   INR: 1.09 ratio         PTT - ( 2022 16:18 )  PTT:31.6 sec        Urinalysis Basic - ( 2022 21:28 )    Color: Yellow / Appearance: Clear / S.005 / pH: x  Gluc: x / Ketone: Negative  / Bili: Negative / Urobili: Negative   Blood: x / Protein: Negative / Nitrite: Negative   Leuk Esterase: Negative / RBC: x / WBC x   Sq Epi: x / Non Sq Epi: x / Bacteria: x      RADIOLOGY:      
Patient is a 61y old  Female who presents with a chief complaint of Right second toe Diabetic Ulcer  Right foot cellulitis  Hx of MRSA infection (2022 09:23)    HPI:  62 y/o Female with PM hx DM, HTN on ASA 81mg, MRSA, Left second toe amputation, R great toe partial amputation who presented to [facility] ED with right second toe pain. She reports pain increased over last couple of weeks and has had some discharge.  Pt reports swelling of right lower leg. Pt has been on Bactrim.  She denies fever, no cpain/SOB,  no recent trauma, no n/v/d,  no abd pain,  no urinary complaints. She did havea fine hive type reaction with itching to the left upper forearm,  which is improved now. Was eval by podiatry, given doxycycline, plan for R 2nd toe partial amputation.          PMH: as above  PSH: as above  Meds: per reconciliation sheet, noted below  MEDICATIONS  (STANDING):  artificial  tears Solution 1 Drop(s) Both EYES four times a day  aspirin enteric coated 81 milliGRAM(s) Oral daily  atorvastatin 40 milliGRAM(s) Oral at bedtime  dextrose 5%. 1000 milliLiter(s) (50 mL/Hr) IV Continuous <Continuous>  dextrose 5%. 1000 milliLiter(s) (100 mL/Hr) IV Continuous <Continuous>  dextrose 50% Injectable 25 Gram(s) IV Push once  dextrose 50% Injectable 12.5 Gram(s) IV Push once  dextrose 50% Injectable 25 Gram(s) IV Push once  doxycycline IVPB 100 milliGRAM(s) IV Intermittent every 12 hours  glucagon  Injectable 1 milliGRAM(s) IntraMuscular once  insulin glargine Injectable (LANTUS) 20 Unit(s) SubCutaneous at bedtime  insulin lispro (ADMELOG) corrective regimen sliding scale   SubCutaneous three times a day before meals  insulin lispro (ADMELOG) corrective regimen sliding scale   SubCutaneous at bedtime  lidocaine   4% Patch 1 Patch Transdermal daily  lisinopril 10 milliGRAM(s) Oral daily  loratadine 10 milliGRAM(s) Oral daily  multivitamin 1 Tablet(s) Oral daily      Allergies    sulfa drugs (Hives; Pruritus)  sulfADIAZINE (Hives)    Intolerances      Social: no smoking, no alcohol, no illegal drugs; no recent travel, no exposure to TB  FAMILY HISTORY:  FH: ovarian cancer (Sibling)    FHx: diabetes mellitus (Father)       no history of premature cardiovascular disease in first degree relatives    ROS: the patient denies fever, no chills, no HA, no dizziness, no sore throat, no blurry vision, no CP, no palpitations, no SOB, no cough, no abdominal pain, no diarrhea, no N/V, no dysuria, no leg pain, no claudication, no rash, no joint aches, no rectal pain or bleeding, no night sweats    All other systems reviewed and are negative    Vital Signs Last 24 Hrs  T(C): 36.4 (2022 07:37), Max: 37.1 (2022 15:47)  T(F): 97.6 (2022 07:37), Max: 98.8 (2022 20:37)  HR: 89 (2022 07:37) (86 - 96)  BP: 141/84 (2022 07:37) (105/65 - 141/84)  BP(mean): 83 (2022 20:37) (80 - 83)  RR: 18 (2022 07:37) (14 - 18)  SpO2: 97% (2022 07:37) (97% - 100%)  Daily Height in cm: 165.1 (2022 15:42)    Daily     PE:  Constitutional: NAD  HEENT: NC/AT, EOMI, PERRLA, conjunctivae clear; ears and nose atraumatic; pharynx benign  Neck: supple; thyroid not palpable  Back: no tenderness  Respiratory: respiratory effort normal; clear to auscultation  Cardiovascular: S1S2 regular, no murmurs  Abdomen: soft, not tender, not distended, positive BS; liver and spleen WNL  Genitourinary: no suprapubic tenderness  Lymphatic: no LN palpable  Musculoskeletal: no muscle tenderness, no joint swelling or tenderness  Extremities: no pedal edema R great toe s/p amp, R 2nd toe ulcer   Neurological/ Psychiatric: AxOx3, Judgement and insight normal;  moving all extremities  Skin: no rashes; no palpable lesions    Labs: all available labs reviewed                        10.0   5.37  )-----------( 450      ( 2022 09:14 )             32.4     -    136  |  107  |  14  ----------------------------<  216<H>  4.8   |  26  |  0.77    Ca    9.4      2022 09:14    TPro  8.7<H>  /  Alb  3.1<L>  /  TBili  0.2  /  DBili  x   /  AST  14<L>  /  ALT  23  /  AlkPhos  112  04-25     LIVER FUNCTIONS - ( 2022 16:18 )  Alb: 3.1 g/dL / Pro: 8.7 gm/dL / ALK PHOS: 112 U/L / ALT: 23 U/L / AST: 14 U/L / GGT: x           Urinalysis Basic - ( 2022 21:28 )    Color: Yellow / Appearance: Clear / S.005 / pH: x  Gluc: x / Ketone: Negative  / Bili: Negative / Urobili: Negative   Blood: x / Protein: Negative / Nitrite: Negative   Leuk Esterase: Negative / RBC: x / WBC x   Sq Epi: x / Non Sq Epi: x / Bacteria: x          Radiology: all available radiological tests reviewed  < from: US Duplex Venous Lower Ext Ltd, Right (22 @ 18:48) >    ACC: [id number] EXAM:  US DPLX LWR EXT VEINS LTD RT                          PROCEDURE DATE:  2022          INTERPRETATION:  CLINICAL INFORMATION: Pain and swelling    COMPARISON: None available.    TECHNIQUE: Duplex sonography of the RIGHT LOWER extremity veins with   color and spectral Doppler, with and without compression.    FINDINGS:    There is normal compressibility of the right common femoral, femoral and   popliteal veins.  The contralateral common femoral vein is patent.  Doppler examination shows normal spontaneous and phasic flow.    No calf vein thrombosis is detected.    IMPRESSION:  No evidence of right lower extremity deep venous thrombosis.        Advanced directives addressed: full resuscitation

## 2022-04-26 NOTE — DIETITIAN INITIAL EVALUATION ADULT - PERTINENT MEDS FT
MEDICATIONS  (STANDING):  artificial  tears Solution 1 Drop(s) Both EYES four times a day  aspirin enteric coated 81 milliGRAM(s) Oral daily  atorvastatin 40 milliGRAM(s) Oral at bedtime  dextrose 5%. 1000 milliLiter(s) (50 mL/Hr) IV Continuous <Continuous>  dextrose 5%. 1000 milliLiter(s) (100 mL/Hr) IV Continuous <Continuous>  dextrose 50% Injectable 25 Gram(s) IV Push once  dextrose 50% Injectable 12.5 Gram(s) IV Push once  dextrose 50% Injectable 25 Gram(s) IV Push once  doxycycline IVPB 100 milliGRAM(s) IV Intermittent every 12 hours  glucagon  Injectable 1 milliGRAM(s) IntraMuscular once  insulin glargine Injectable (LANTUS) 20 Unit(s) SubCutaneous at bedtime  insulin lispro (ADMELOG) corrective regimen sliding scale   SubCutaneous three times a day before meals  insulin lispro (ADMELOG) corrective regimen sliding scale   SubCutaneous at bedtime  lidocaine   4% Patch 1 Patch Transdermal daily  lisinopril 10 milliGRAM(s) Oral daily  loratadine 10 milliGRAM(s) Oral daily  multivitamin 1 Tablet(s) Oral daily    MEDICATIONS  (PRN):  acetaminophen     Tablet .. 650 milliGRAM(s) Oral every 6 hours PRN Temp greater or equal to 38.5C (101.3F), Mild Pain (1 - 3)  dextrose Oral Gel 15 Gram(s) Oral once PRN Blood Glucose LESS THAN 70 milliGRAM(s)/deciliter  fluticasone propionate 50 MICROgram(s)/spray Nasal Spray 2 Spray(s) Both Nostrils two times a day PRN congestion  gabapentin 300 milliGRAM(s) Oral two times a day PRN pain

## 2022-04-26 NOTE — DIETITIAN INITIAL EVALUATION ADULT - PERTINENT LABORATORY DATA
04-26    136  |  107  |  14  ----------------------------<  216<H>  4.8   |  26  |  0.77    Ca    9.4      26 Apr 2022 09:14    TPro  8.7<H>  /  Alb  3.1<L>  /  TBili  0.2  /  DBili  x   /  AST  14<L>  /  ALT  23  /  AlkPhos  112  04-25  POCT Blood Glucose.: 209 mg/dL (04-26-22 @ 08:20)  A1C with Estimated Average Glucose Result: 11.4 % (07-22-21 @ 10:26)

## 2022-04-26 NOTE — DIETITIAN INITIAL EVALUATION ADULT - ADD RECOMMEND
Rx vit C 500mg and Abebe BID daily to facilitate wound healing; Continue MVI daily; Encourage HBV protein sources

## 2022-04-26 NOTE — CONSULT NOTE ADULT - ASSESSMENT
60 y/o Female with PM hx DM, HTN on ASA 81mg, MRSA, Left second toe amputation, R great toe partial amputation who presented to [facility] ED with right second toe pain. She reports pain increased over last couple of weeks and has had some discharge.  Pt reports swelling of right lower leg. Pt has been on Bactrim.  She denies fever, no cpain/SOB,  no recent trauma, no n/v/d,  no abd pain,  no urinary complaints. She did have a fine hive type reaction with itching to the left upper forearm,  which is improved now. Was eval by podiatry, given doxycycline, plan for R 2nd toe partial amputation.    1. R 2nd toe ulcer/cellulitis/? om  - f/u MRI R foot   - plan for partial amputation thursday  - podiatry follow up noted  - f/u wound cx, blood cx  - on doxycycline 100mg BID  - continue with antibiotic coverage  - fu cbc  - tolerating abx well so far; no side effects noted.  - reason for abx use and side effects reviewed with patient  - supportive care    2. other issues - care per medicine

## 2022-04-26 NOTE — ED ADULT NURSE NOTE - CAS EDN DISCHARGE INTERVENTIONS
Problem: Goal Outcome Summary  Goal: Goal Outcome Summary  Outcome: No Change  Patient had 1X watery stool. Otherwise slept well. VSS. Continue to monitor and report changes to the team.        Arm band on/IV intact

## 2022-04-26 NOTE — ED ADULT NURSE NOTE - NSIMPLEMENTINTERV_GEN_ALL_ED
Implemented All Fall Risk Interventions:  [place] to call system. Call bell, personal items and telephone within reach. Instruct patient to call for assistance. Room bathroom lighting operational. Non-slip footwear when patient is off stretcher. Physically safe environment: no spills, clutter or unnecessary equipment. Stretcher in lowest position, wheels locked, appropriate side rails in place. Provide visual cue, wrist band, yellow gown, etc. Monitor gait and stability. Monitor for mental status changes and reorient to person, place, and time. Review medications for side effects contributing to fall risk. Reinforce activity limits and safety measures with patient and family.

## 2022-04-26 NOTE — DIETITIAN INITIAL EVALUATION ADULT - LITERATURE/VIDEOS GIVEN
LYING IN BED. ALERT AND ORIENTED X4. HICCUPS STARTED BACK UP AGAIN. RESP
IRREG, NONLABORED. C/O PAIN IN LT KNEE AS 7. ACE WRAP  NOTED WITH OLD
DRAINAGE. PLEXI BOOT ON LT FOOT. SCD ON RLE. USES URINAL. SALINE LOCK NOTED TO
RT FOREARM. TEMP IS ELEVATED. ENCOURAGED USE OF I.S. HE STATED E HAD BEEN
USING IT. SR ELEVATED X2. CL IN REACH. PEDAL PULSES STRONG BILAT. Consistent CHO meal planning

## 2022-04-26 NOTE — DIETITIAN INITIAL EVALUATION ADULT - OTHER INFO
60 y/o Female with PM hx DM, HTN on ASA 81mg, MRSA, L. 2nd toe amputation, R great toe partial amputation who presented to [facility] ED with right second toe pain. She reports pain increased over last couple of weeks and has had some discharge and swelling of right lower leg. Podiatry and ID following.

## 2022-04-26 NOTE — PATIENT PROFILE ADULT - FALL HARM RISK - UNIVERSAL INTERVENTIONS
Bed in lowest position, wheels locked, appropriate side rails in place/Call bell, personal items and telephone in reach/Instruct patient to call for assistance before getting out of bed or chair/Non-slip footwear when patient is out of bed/[place] to call system/Physically safe environment - no spills, clutter or unnecessary equipment/Purposeful Proactive Rounding/Room/bathroom lighting operational, light cord in reach

## 2022-04-26 NOTE — DIETITIAN INITIAL EVALUATION ADULT - NS FNS DIET ORDER
Diet, Regular:   Consistent Carbohydrate {Evening Snack} (CSTCHOSN)  Low Sodium (04-25-22 @ 22:58)

## 2022-04-27 LAB
ANION GAP SERPL CALC-SCNC: 5 MMOL/L — SIGNIFICANT CHANGE UP (ref 5–17)
BASOPHILS # BLD AUTO: 0.07 K/UL — SIGNIFICANT CHANGE UP (ref 0–0.2)
BASOPHILS NFR BLD AUTO: 1.1 % — SIGNIFICANT CHANGE UP (ref 0–2)
BUN SERPL-MCNC: 15 MG/DL — SIGNIFICANT CHANGE UP (ref 7–23)
CALCIUM SERPL-MCNC: 9.5 MG/DL — SIGNIFICANT CHANGE UP (ref 8.5–10.1)
CHLORIDE SERPL-SCNC: 107 MMOL/L — SIGNIFICANT CHANGE UP (ref 96–108)
CO2 SERPL-SCNC: 25 MMOL/L — SIGNIFICANT CHANGE UP (ref 22–31)
CREAT SERPL-MCNC: 0.75 MG/DL — SIGNIFICANT CHANGE UP (ref 0.5–1.3)
CRP SERPL-MCNC: 28 MG/L — HIGH
CULTURE RESULTS: SIGNIFICANT CHANGE UP
EGFR: 92 ML/MIN/1.73M2 — SIGNIFICANT CHANGE UP
EOSINOPHIL # BLD AUTO: 0.17 K/UL — SIGNIFICANT CHANGE UP (ref 0–0.5)
EOSINOPHIL NFR BLD AUTO: 2.6 % — SIGNIFICANT CHANGE UP (ref 0–6)
GLUCOSE SERPL-MCNC: 189 MG/DL — HIGH (ref 70–99)
HCT VFR BLD CALC: 33.8 % — LOW (ref 34.5–45)
HGB BLD-MCNC: 10.4 G/DL — LOW (ref 11.5–15.5)
IMM GRANULOCYTES NFR BLD AUTO: 0.3 % — SIGNIFICANT CHANGE UP (ref 0–1.5)
LYMPHOCYTES # BLD AUTO: 2.3 K/UL — SIGNIFICANT CHANGE UP (ref 1–3.3)
LYMPHOCYTES # BLD AUTO: 35.7 % — SIGNIFICANT CHANGE UP (ref 13–44)
MCHC RBC-ENTMCNC: 24.8 PG — LOW (ref 27–34)
MCHC RBC-ENTMCNC: 30.8 GM/DL — LOW (ref 32–36)
MCV RBC AUTO: 80.7 FL — SIGNIFICANT CHANGE UP (ref 80–100)
MONOCYTES # BLD AUTO: 0.69 K/UL — SIGNIFICANT CHANGE UP (ref 0–0.9)
MONOCYTES NFR BLD AUTO: 10.7 % — SIGNIFICANT CHANGE UP (ref 2–14)
NEUTROPHILS # BLD AUTO: 3.19 K/UL — SIGNIFICANT CHANGE UP (ref 1.8–7.4)
NEUTROPHILS NFR BLD AUTO: 49.6 % — SIGNIFICANT CHANGE UP (ref 43–77)
PHOSPHATE SERPL-MCNC: 3.4 MG/DL — SIGNIFICANT CHANGE UP (ref 2.5–4.5)
PLATELET # BLD AUTO: 482 K/UL — HIGH (ref 150–400)
POTASSIUM SERPL-MCNC: 4.6 MMOL/L — SIGNIFICANT CHANGE UP (ref 3.5–5.3)
POTASSIUM SERPL-SCNC: 4.6 MMOL/L — SIGNIFICANT CHANGE UP (ref 3.5–5.3)
RBC # BLD: 4.19 M/UL — SIGNIFICANT CHANGE UP (ref 3.8–5.2)
RBC # FLD: 14.4 % — SIGNIFICANT CHANGE UP (ref 10.3–14.5)
SODIUM SERPL-SCNC: 137 MMOL/L — SIGNIFICANT CHANGE UP (ref 135–145)
SPECIMEN SOURCE: SIGNIFICANT CHANGE UP
WBC # BLD: 6.44 K/UL — SIGNIFICANT CHANGE UP (ref 3.8–10.5)
WBC # FLD AUTO: 6.44 K/UL — SIGNIFICANT CHANGE UP (ref 3.8–10.5)

## 2022-04-27 PROCEDURE — 99232 SBSQ HOSP IP/OBS MODERATE 35: CPT

## 2022-04-27 RX ORDER — SODIUM CHLORIDE 9 MG/ML
500 INJECTION INTRAMUSCULAR; INTRAVENOUS; SUBCUTANEOUS
Refills: 0 | Status: DISCONTINUED | OUTPATIENT
Start: 2022-04-27 | End: 2022-04-28

## 2022-04-27 RX ORDER — VANCOMYCIN HCL 1 G
1250 VIAL (EA) INTRAVENOUS EVERY 12 HOURS
Refills: 0 | Status: DISCONTINUED | OUTPATIENT
Start: 2022-04-27 | End: 2022-04-28

## 2022-04-27 RX ORDER — INSULIN GLARGINE 100 [IU]/ML
10 INJECTION, SOLUTION SUBCUTANEOUS ONCE
Refills: 0 | Status: COMPLETED | OUTPATIENT
Start: 2022-04-27 | End: 2022-04-27

## 2022-04-27 RX ADMIN — LIDOCAINE 1 PATCH: 4 CREAM TOPICAL at 11:06

## 2022-04-27 RX ADMIN — Medication 2: at 07:42

## 2022-04-27 RX ADMIN — Medication 1 DROP(S): at 06:19

## 2022-04-27 RX ADMIN — LORATADINE 10 MILLIGRAM(S): 10 TABLET ORAL at 11:07

## 2022-04-27 RX ADMIN — Medication 1 DROP(S): at 21:11

## 2022-04-27 RX ADMIN — ATORVASTATIN CALCIUM 40 MILLIGRAM(S): 80 TABLET, FILM COATED ORAL at 21:08

## 2022-04-27 RX ADMIN — Medication 166.67 MILLIGRAM(S): at 11:06

## 2022-04-27 RX ADMIN — Medication 1 TABLET(S): at 21:10

## 2022-04-27 RX ADMIN — LISINOPRIL 10 MILLIGRAM(S): 2.5 TABLET ORAL at 11:06

## 2022-04-27 RX ADMIN — Medication 81 MILLIGRAM(S): at 11:06

## 2022-04-27 RX ADMIN — Medication 2: at 11:44

## 2022-04-27 RX ADMIN — GABAPENTIN 300 MILLIGRAM(S): 400 CAPSULE ORAL at 21:10

## 2022-04-27 RX ADMIN — INSULIN GLARGINE 10 UNIT(S): 100 INJECTION, SOLUTION SUBCUTANEOUS at 21:08

## 2022-04-27 RX ADMIN — Medication 2: at 17:08

## 2022-04-27 RX ADMIN — LIDOCAINE 1 PATCH: 4 CREAM TOPICAL at 07:03

## 2022-04-27 RX ADMIN — Medication 650 MILLIGRAM(S): at 21:14

## 2022-04-27 RX ADMIN — Medication 166.67 MILLIGRAM(S): at 21:15

## 2022-04-27 RX ADMIN — Medication 1 DROP(S): at 18:25

## 2022-04-27 RX ADMIN — Medication 1 DROP(S): at 12:17

## 2022-04-28 ENCOUNTER — TRANSCRIPTION ENCOUNTER (OUTPATIENT)
Age: 62
End: 2022-04-28

## 2022-04-28 ENCOUNTER — RESULT REVIEW (OUTPATIENT)
Age: 62
End: 2022-04-28

## 2022-04-28 LAB
ANION GAP SERPL CALC-SCNC: 4 MMOL/L — LOW (ref 5–17)
BASOPHILS # BLD AUTO: 0.06 K/UL — SIGNIFICANT CHANGE UP (ref 0–0.2)
BASOPHILS NFR BLD AUTO: 0.8 % — SIGNIFICANT CHANGE UP (ref 0–2)
BUN SERPL-MCNC: 15 MG/DL — SIGNIFICANT CHANGE UP (ref 7–23)
CALCIUM SERPL-MCNC: 9.6 MG/DL — SIGNIFICANT CHANGE UP (ref 8.5–10.1)
CHLORIDE SERPL-SCNC: 108 MMOL/L — SIGNIFICANT CHANGE UP (ref 96–108)
CO2 SERPL-SCNC: 26 MMOL/L — SIGNIFICANT CHANGE UP (ref 22–31)
CREAT SERPL-MCNC: 0.66 MG/DL — SIGNIFICANT CHANGE UP (ref 0.5–1.3)
EGFR: 101 ML/MIN/1.73M2 — SIGNIFICANT CHANGE UP
EOSINOPHIL # BLD AUTO: 0.15 K/UL — SIGNIFICANT CHANGE UP (ref 0–0.5)
EOSINOPHIL NFR BLD AUTO: 1.9 % — SIGNIFICANT CHANGE UP (ref 0–6)
GLUCOSE SERPL-MCNC: 112 MG/DL — HIGH (ref 70–99)
HCT VFR BLD CALC: 33.5 % — LOW (ref 34.5–45)
HGB BLD-MCNC: 10.4 G/DL — LOW (ref 11.5–15.5)
IMM GRANULOCYTES NFR BLD AUTO: 0.3 % — SIGNIFICANT CHANGE UP (ref 0–1.5)
LYMPHOCYTES # BLD AUTO: 2.99 K/UL — SIGNIFICANT CHANGE UP (ref 1–3.3)
LYMPHOCYTES # BLD AUTO: 38.4 % — SIGNIFICANT CHANGE UP (ref 13–44)
MCHC RBC-ENTMCNC: 24.9 PG — LOW (ref 27–34)
MCHC RBC-ENTMCNC: 31 GM/DL — LOW (ref 32–36)
MCV RBC AUTO: 80.1 FL — SIGNIFICANT CHANGE UP (ref 80–100)
MONOCYTES # BLD AUTO: 0.75 K/UL — SIGNIFICANT CHANGE UP (ref 0–0.9)
MONOCYTES NFR BLD AUTO: 9.6 % — SIGNIFICANT CHANGE UP (ref 2–14)
NEUTROPHILS # BLD AUTO: 3.82 K/UL — SIGNIFICANT CHANGE UP (ref 1.8–7.4)
NEUTROPHILS NFR BLD AUTO: 49 % — SIGNIFICANT CHANGE UP (ref 43–77)
PLATELET # BLD AUTO: 510 K/UL — HIGH (ref 150–400)
POTASSIUM SERPL-MCNC: 4.4 MMOL/L — SIGNIFICANT CHANGE UP (ref 3.5–5.3)
POTASSIUM SERPL-SCNC: 4.4 MMOL/L — SIGNIFICANT CHANGE UP (ref 3.5–5.3)
RBC # BLD: 4.18 M/UL — SIGNIFICANT CHANGE UP (ref 3.8–5.2)
RBC # FLD: 14.5 % — SIGNIFICANT CHANGE UP (ref 10.3–14.5)
SODIUM SERPL-SCNC: 138 MMOL/L — SIGNIFICANT CHANGE UP (ref 135–145)
VANCOMYCIN TROUGH SERPL-MCNC: 15.6 UG/ML — SIGNIFICANT CHANGE UP (ref 10–20)
WBC # BLD: 7.79 K/UL — SIGNIFICANT CHANGE UP (ref 3.8–10.5)
WBC # FLD AUTO: 7.79 K/UL — SIGNIFICANT CHANGE UP (ref 3.8–10.5)

## 2022-04-28 PROCEDURE — 88304 TISSUE EXAM BY PATHOLOGIST: CPT | Mod: 26

## 2022-04-28 PROCEDURE — 88311 DECALCIFY TISSUE: CPT | Mod: 26

## 2022-04-28 PROCEDURE — 73630 X-RAY EXAM OF FOOT: CPT | Mod: 26,RT

## 2022-04-28 PROCEDURE — 99232 SBSQ HOSP IP/OBS MODERATE 35: CPT

## 2022-04-28 PROCEDURE — 88305 TISSUE EXAM BY PATHOLOGIST: CPT | Mod: 26

## 2022-04-28 RX ORDER — FLUTICASONE PROPIONATE 50 MCG
2 SPRAY, SUSPENSION NASAL
Refills: 0 | Status: DISCONTINUED | OUTPATIENT
Start: 2022-04-28 | End: 2022-04-30

## 2022-04-28 RX ORDER — INSULIN LISPRO 100/ML
VIAL (ML) SUBCUTANEOUS
Refills: 0 | Status: DISCONTINUED | OUTPATIENT
Start: 2022-04-28 | End: 2022-04-30

## 2022-04-28 RX ORDER — SODIUM CHLORIDE 9 MG/ML
1000 INJECTION, SOLUTION INTRAVENOUS
Refills: 0 | Status: DISCONTINUED | OUTPATIENT
Start: 2022-04-28 | End: 2022-04-30

## 2022-04-28 RX ORDER — LIDOCAINE 4 G/100G
1 CREAM TOPICAL DAILY
Refills: 0 | Status: DISCONTINUED | OUTPATIENT
Start: 2022-04-28 | End: 2022-04-30

## 2022-04-28 RX ORDER — SODIUM CHLORIDE 9 MG/ML
1000 INJECTION INTRAMUSCULAR; INTRAVENOUS; SUBCUTANEOUS
Refills: 0 | Status: COMPLETED | OUTPATIENT
Start: 2022-04-28 | End: 2022-04-28

## 2022-04-28 RX ORDER — ASPIRIN/CALCIUM CARB/MAGNESIUM 324 MG
81 TABLET ORAL DAILY
Refills: 0 | Status: DISCONTINUED | OUTPATIENT
Start: 2022-04-28 | End: 2022-04-30

## 2022-04-28 RX ORDER — GABAPENTIN 400 MG/1
300 CAPSULE ORAL
Refills: 0 | Status: DISCONTINUED | OUTPATIENT
Start: 2022-04-28 | End: 2022-04-30

## 2022-04-28 RX ORDER — FENTANYL CITRATE 50 UG/ML
50 INJECTION INTRAVENOUS
Refills: 0 | Status: DISCONTINUED | OUTPATIENT
Start: 2022-04-28 | End: 2022-04-28

## 2022-04-28 RX ORDER — ONDANSETRON 8 MG/1
4 TABLET, FILM COATED ORAL ONCE
Refills: 0 | Status: DISCONTINUED | OUTPATIENT
Start: 2022-04-28 | End: 2022-04-28

## 2022-04-28 RX ORDER — VANCOMYCIN HCL 1 G
1250 VIAL (EA) INTRAVENOUS EVERY 12 HOURS
Refills: 0 | Status: DISCONTINUED | OUTPATIENT
Start: 2022-04-28 | End: 2022-04-30

## 2022-04-28 RX ORDER — GLUCAGON INJECTION, SOLUTION 0.5 MG/.1ML
1 INJECTION, SOLUTION SUBCUTANEOUS ONCE
Refills: 0 | Status: DISCONTINUED | OUTPATIENT
Start: 2022-04-28 | End: 2022-04-30

## 2022-04-28 RX ORDER — INSULIN LISPRO 100/ML
VIAL (ML) SUBCUTANEOUS AT BEDTIME
Refills: 0 | Status: DISCONTINUED | OUTPATIENT
Start: 2022-04-28 | End: 2022-04-30

## 2022-04-28 RX ORDER — OXYCODONE HYDROCHLORIDE 5 MG/1
10 TABLET ORAL ONCE
Refills: 0 | Status: DISCONTINUED | OUTPATIENT
Start: 2022-04-28 | End: 2022-04-28

## 2022-04-28 RX ORDER — ATORVASTATIN CALCIUM 80 MG/1
40 TABLET, FILM COATED ORAL AT BEDTIME
Refills: 0 | Status: DISCONTINUED | OUTPATIENT
Start: 2022-04-28 | End: 2022-04-30

## 2022-04-28 RX ORDER — SODIUM CHLORIDE 9 MG/ML
500 INJECTION INTRAMUSCULAR; INTRAVENOUS; SUBCUTANEOUS
Refills: 0 | Status: DISCONTINUED | OUTPATIENT
Start: 2022-04-28 | End: 2022-04-30

## 2022-04-28 RX ORDER — INSULIN GLARGINE 100 [IU]/ML
20 INJECTION, SOLUTION SUBCUTANEOUS AT BEDTIME
Refills: 0 | Status: DISCONTINUED | OUTPATIENT
Start: 2022-04-28 | End: 2022-04-30

## 2022-04-28 RX ORDER — LORATADINE 10 MG/1
10 TABLET ORAL DAILY
Refills: 0 | Status: DISCONTINUED | OUTPATIENT
Start: 2022-04-28 | End: 2022-04-30

## 2022-04-28 RX ORDER — LISINOPRIL 2.5 MG/1
10 TABLET ORAL DAILY
Refills: 0 | Status: DISCONTINUED | OUTPATIENT
Start: 2022-04-28 | End: 2022-04-30

## 2022-04-28 RX ORDER — SODIUM CHLORIDE 9 MG/ML
1000 INJECTION, SOLUTION INTRAVENOUS
Refills: 0 | Status: DISCONTINUED | OUTPATIENT
Start: 2022-04-28 | End: 2022-04-28

## 2022-04-28 RX ORDER — ACETAMINOPHEN 500 MG
650 TABLET ORAL EVERY 6 HOURS
Refills: 0 | Status: DISCONTINUED | OUTPATIENT
Start: 2022-04-28 | End: 2022-04-30

## 2022-04-28 RX ADMIN — ATORVASTATIN CALCIUM 40 MILLIGRAM(S): 80 TABLET, FILM COATED ORAL at 22:35

## 2022-04-28 RX ADMIN — Medication 2: at 06:46

## 2022-04-28 RX ADMIN — LIDOCAINE 1 PATCH: 4 CREAM TOPICAL at 22:00

## 2022-04-28 RX ADMIN — LIDOCAINE 1 PATCH: 4 CREAM TOPICAL at 19:00

## 2022-04-28 RX ADMIN — INSULIN GLARGINE 20 UNIT(S): 100 INJECTION, SOLUTION SUBCUTANEOUS at 22:35

## 2022-04-28 RX ADMIN — Medication 1 DROP(S): at 06:43

## 2022-04-28 RX ADMIN — GABAPENTIN 300 MILLIGRAM(S): 400 CAPSULE ORAL at 22:44

## 2022-04-28 RX ADMIN — Medication 2: at 22:36

## 2022-04-28 RX ADMIN — LORATADINE 10 MILLIGRAM(S): 10 TABLET ORAL at 11:52

## 2022-04-28 RX ADMIN — Medication 1 DROP(S): at 16:23

## 2022-04-28 RX ADMIN — LIDOCAINE 1 PATCH: 4 CREAM TOPICAL at 09:31

## 2022-04-28 RX ADMIN — Medication 1 TABLET(S): at 11:52

## 2022-04-28 RX ADMIN — SODIUM CHLORIDE 80 MILLILITER(S): 9 INJECTION INTRAMUSCULAR; INTRAVENOUS; SUBCUTANEOUS at 12:22

## 2022-04-28 RX ADMIN — Medication 650 MILLIGRAM(S): at 17:38

## 2022-04-28 RX ADMIN — Medication 1 DROP(S): at 11:53

## 2022-04-28 RX ADMIN — Medication 650 MILLIGRAM(S): at 16:44

## 2022-04-28 RX ADMIN — Medication 166.67 MILLIGRAM(S): at 10:00

## 2022-04-28 RX ADMIN — Medication 4: at 16:24

## 2022-04-28 RX ADMIN — LIDOCAINE 1 PATCH: 4 CREAM TOPICAL at 21:00

## 2022-04-28 RX ADMIN — LIDOCAINE 1 PATCH: 4 CREAM TOPICAL at 10:00

## 2022-04-28 RX ADMIN — Medication 166.67 MILLIGRAM(S): at 22:36

## 2022-04-28 RX ADMIN — Medication 81 MILLIGRAM(S): at 11:52

## 2022-04-28 RX ADMIN — GABAPENTIN 300 MILLIGRAM(S): 400 CAPSULE ORAL at 16:44

## 2022-04-28 RX ADMIN — Medication 166.67 MILLIGRAM(S): at 09:30

## 2022-04-28 NOTE — BRIEF OPERATIVE NOTE - SPECIMENS
deep bone culture, 2 path culture, clean margin 1- deep wound culture, 2- Rt 2nd toe bone, and soft tissue covering as distal margin 3- Rt 2nd  toe proximal phalanx head as a proximal margin

## 2022-04-28 NOTE — DISCHARGE NOTE NURSING/CASE MANAGEMENT/SOCIAL WORK - PATIENT PORTAL LINK FT
You can access the FollowMyHealth Patient Portal offered by [facility] by registering at the following website: [email or web address]. By joining [facility]’s FollowMyHealth portal, you will also be able to view your health information using other applications (apps) compatible with our system.

## 2022-04-28 NOTE — CHART NOTE - NSCHARTNOTEFT_GEN_A_CORE
POST OP NOTE  ASHLEY CRABTREE  MRN-[id number]    Date: 4/28/22  Surgeon: Dr. Hermes Bermudez  Assistants: Saúl Tabor PGY-1, Del Parson PGY-3  Procedure: Partial amputation of right 2nd toe  Pathology: 1- deep wound culture, 2- Rt 2nd toe bone, and soft tissue covering as distal margin 3- Rt 2nd  toe proximal phalanx head as a proximal margin  EBL: 1ml    Patient tolerated both anesthesia and  procedures well and was transported from the operating room to the recovery room with vital signs stable and neurovascular status intact.  Patient transferred to PACU then was transferred to floors in stable condition.       PE / Post Op Check:       GEN: NAD, AAOx3, resting comfortably with pain controlled      LE Focused: CFT shows adequate perfusion b/l with no signs of ischemic compromise.  No strikethrough is appreciated on surgical dressings.  Dressings were dry, clean, and intact.  No neuromuscular deficits appreciated.    Patient to continue to Rest, Elevate legs. NO ICE.   Patient to be weight bearing (heel weightbearing) as tolerated to right foot with surgical shoe at all times when out of bed. POST OP NOTE  ASHLEY CRABTREE  [id number]    Date: 4/28/22  Surgeon: Dr. Hermes Bermudez  Assistants: Saúl Tabor PGY-1, Del Parson PGY-3  Procedure: Partial amputation of right 2nd toe  Pathology: 1- deep wound culture, 2- Rt 2nd toe bone, and soft tissue covering as distal margin 3- Rt 2nd  toe proximal phalanx head as a proximal margin  EBL: 1ml    Patient tolerated both anesthesia and  procedures well and was transported from the operating room to the recovery room with vital signs stable and neurovascular status intact.  Patient transferred to PACU then was transferred to floors in stable condition.       PE / Post Op Check:       GEN: NAD, AAOx3, resting comfortably with pain controlled (pt says she is starting to feel some throbbing/pain coming as she thinks the local anesthesia is starting to wear off, nursing made aware and will give tyenol/neurotin prn)       LE Focused: CFT shows adequate perfusion b/l with no signs of ischemic compromise.  No strikethrough is appreciated on surgical dressings.  Dressings were dry, clean, and intact.  No neuromuscular deficits appreciated.    Patient to continue to Rest, Elevate legs. NO ICE.   Patient to be weight bearing (heel weightbearing) as tolerated to right foot with surgical shoe at all times when out of bed.

## 2022-04-28 NOTE — BRIEF OPERATIVE NOTE - NSICDXBRIEFPOSTOP_GEN_ALL_CORE_FT
POST-OP DIAGNOSIS:  Amputation of toe of right foot 28-Apr-2022 10:48:53 2nd toe partial amputation Saúl Tabor

## 2022-04-28 NOTE — BRIEF OPERATIVE NOTE - OPERATION/FINDINGS
Partial amputation of 2nd right toe, Partial amputation of 2nd right toe. For details, please check the post-operative notes.

## 2022-04-28 NOTE — DISCHARGE NOTE NURSING/CASE MANAGEMENT/SOCIAL WORK - NSDCPEFALRISK_GEN_ALL_CORE
For information on Fall & Injury Prevention, visit: [email or web address] OR  [email or web address] OR  https://www.cdc.gov/steadi/patient.html

## 2022-04-29 LAB
ANION GAP SERPL CALC-SCNC: 4 MMOL/L — LOW (ref 5–17)
BASOPHILS # BLD AUTO: 0.04 K/UL — SIGNIFICANT CHANGE UP (ref 0–0.2)
BASOPHILS NFR BLD AUTO: 0.5 % — SIGNIFICANT CHANGE UP (ref 0–2)
BUN SERPL-MCNC: 16 MG/DL — SIGNIFICANT CHANGE UP (ref 7–23)
CALCIUM SERPL-MCNC: 9.2 MG/DL — SIGNIFICANT CHANGE UP (ref 8.5–10.1)
CHLORIDE SERPL-SCNC: 105 MMOL/L — SIGNIFICANT CHANGE UP (ref 96–108)
CO2 SERPL-SCNC: 27 MMOL/L — SIGNIFICANT CHANGE UP (ref 22–31)
CREAT SERPL-MCNC: 0.7 MG/DL — SIGNIFICANT CHANGE UP (ref 0.5–1.3)
EGFR: 100 ML/MIN/1.73M2 — SIGNIFICANT CHANGE UP
EOSINOPHIL # BLD AUTO: 0.15 K/UL — SIGNIFICANT CHANGE UP (ref 0–0.5)
EOSINOPHIL NFR BLD AUTO: 1.9 % — SIGNIFICANT CHANGE UP (ref 0–6)
GLUCOSE SERPL-MCNC: 252 MG/DL — HIGH (ref 70–99)
HCT VFR BLD CALC: 32.4 % — LOW (ref 34.5–45)
HGB BLD-MCNC: 10 G/DL — LOW (ref 11.5–15.5)
IMM GRANULOCYTES NFR BLD AUTO: 0.4 % — SIGNIFICANT CHANGE UP (ref 0–1.5)
LYMPHOCYTES # BLD AUTO: 2.29 K/UL — SIGNIFICANT CHANGE UP (ref 1–3.3)
LYMPHOCYTES # BLD AUTO: 28.4 % — SIGNIFICANT CHANGE UP (ref 13–44)
MCHC RBC-ENTMCNC: 24.7 PG — LOW (ref 27–34)
MCHC RBC-ENTMCNC: 30.9 GM/DL — LOW (ref 32–36)
MCV RBC AUTO: 80 FL — SIGNIFICANT CHANGE UP (ref 80–100)
MONOCYTES # BLD AUTO: 0.76 K/UL — SIGNIFICANT CHANGE UP (ref 0–0.9)
MONOCYTES NFR BLD AUTO: 9.4 % — SIGNIFICANT CHANGE UP (ref 2–14)
NEUTROPHILS # BLD AUTO: 4.8 K/UL — SIGNIFICANT CHANGE UP (ref 1.8–7.4)
NEUTROPHILS NFR BLD AUTO: 59.4 % — SIGNIFICANT CHANGE UP (ref 43–77)
PLATELET # BLD AUTO: 502 K/UL — HIGH (ref 150–400)
POTASSIUM SERPL-MCNC: 4.9 MMOL/L — SIGNIFICANT CHANGE UP (ref 3.5–5.3)
POTASSIUM SERPL-SCNC: 4.9 MMOL/L — SIGNIFICANT CHANGE UP (ref 3.5–5.3)
RBC # BLD: 4.05 M/UL — SIGNIFICANT CHANGE UP (ref 3.8–5.2)
RBC # FLD: 14.5 % — SIGNIFICANT CHANGE UP (ref 10.3–14.5)
SODIUM SERPL-SCNC: 136 MMOL/L — SIGNIFICANT CHANGE UP (ref 135–145)
WBC # BLD: 8.07 K/UL — SIGNIFICANT CHANGE UP (ref 3.8–10.5)
WBC # FLD AUTO: 8.07 K/UL — SIGNIFICANT CHANGE UP (ref 3.8–10.5)

## 2022-04-29 PROCEDURE — 99232 SBSQ HOSP IP/OBS MODERATE 35: CPT

## 2022-04-29 RX ORDER — ENOXAPARIN SODIUM 100 MG/ML
40 INJECTION SUBCUTANEOUS EVERY 24 HOURS
Refills: 0 | Status: DISCONTINUED | OUTPATIENT
Start: 2022-04-29 | End: 2022-04-30

## 2022-04-29 RX ADMIN — Medication 650 MILLIGRAM(S): at 08:41

## 2022-04-29 RX ADMIN — Medication 1 DROP(S): at 00:00

## 2022-04-29 RX ADMIN — Medication 3: at 17:15

## 2022-04-29 RX ADMIN — LISINOPRIL 10 MILLIGRAM(S): 2.5 TABLET ORAL at 09:31

## 2022-04-29 RX ADMIN — Medication 1 DROP(S): at 15:43

## 2022-04-29 RX ADMIN — Medication 30 MILLILITER(S): at 08:40

## 2022-04-29 RX ADMIN — INSULIN GLARGINE 20 UNIT(S): 100 INJECTION, SOLUTION SUBCUTANEOUS at 21:17

## 2022-04-29 RX ADMIN — Medication 650 MILLIGRAM(S): at 21:50

## 2022-04-29 RX ADMIN — Medication 650 MILLIGRAM(S): at 21:17

## 2022-04-29 RX ADMIN — Medication 1 TABLET(S): at 09:30

## 2022-04-29 RX ADMIN — Medication 166.67 MILLIGRAM(S): at 21:17

## 2022-04-29 RX ADMIN — GABAPENTIN 300 MILLIGRAM(S): 400 CAPSULE ORAL at 08:41

## 2022-04-29 RX ADMIN — LIDOCAINE 1 PATCH: 4 CREAM TOPICAL at 21:20

## 2022-04-29 RX ADMIN — Medication 1 DROP(S): at 05:35

## 2022-04-29 RX ADMIN — ENOXAPARIN SODIUM 40 MILLIGRAM(S): 100 INJECTION SUBCUTANEOUS at 12:11

## 2022-04-29 RX ADMIN — Medication 81 MILLIGRAM(S): at 09:31

## 2022-04-29 RX ADMIN — Medication 166.67 MILLIGRAM(S): at 09:30

## 2022-04-29 RX ADMIN — GABAPENTIN 300 MILLIGRAM(S): 400 CAPSULE ORAL at 21:16

## 2022-04-29 RX ADMIN — Medication 3: at 12:11

## 2022-04-29 RX ADMIN — Medication 1: at 08:43

## 2022-04-29 RX ADMIN — Medication 2 SPRAY(S): at 17:15

## 2022-04-29 RX ADMIN — LORATADINE 10 MILLIGRAM(S): 10 TABLET ORAL at 09:31

## 2022-04-29 RX ADMIN — Medication 650 MILLIGRAM(S): at 09:23

## 2022-04-29 RX ADMIN — ATORVASTATIN CALCIUM 40 MILLIGRAM(S): 80 TABLET, FILM COATED ORAL at 21:17

## 2022-04-29 NOTE — PROVIDER CONTACT NOTE (CRITICAL VALUE NOTIFICATION) - TEST AND RESULT REPORTED:
tissue culture 4/28 +staph aureus susceptibility to follow
R foot tissue cultur from 4/25, few gram variable rods per oil power field

## 2022-04-30 ENCOUNTER — TRANSCRIPTION ENCOUNTER (OUTPATIENT)
Age: 62
End: 2022-04-30

## 2022-04-30 VITALS
HEART RATE: 93 BPM | TEMPERATURE: 98 F | RESPIRATION RATE: 18 BRPM | DIASTOLIC BLOOD PRESSURE: 67 MMHG | SYSTOLIC BLOOD PRESSURE: 113 MMHG | OXYGEN SATURATION: 93 %

## 2022-04-30 PROCEDURE — 99232 SBSQ HOSP IP/OBS MODERATE 35: CPT

## 2022-04-30 PROCEDURE — 71045 X-RAY EXAM CHEST 1 VIEW: CPT | Mod: 26

## 2022-04-30 RX ORDER — VANCOMYCIN HCL 1 G
1.25 VIAL (EA) INTRAVENOUS
Qty: 0 | Refills: 0 | DISCHARGE
Start: 2022-04-30

## 2022-04-30 RX ADMIN — GABAPENTIN 300 MILLIGRAM(S): 400 CAPSULE ORAL at 09:43

## 2022-04-30 RX ADMIN — LORATADINE 10 MILLIGRAM(S): 10 TABLET ORAL at 09:44

## 2022-04-30 RX ADMIN — Medication 2: at 13:42

## 2022-04-30 RX ADMIN — Medication 1 DROP(S): at 13:44

## 2022-04-30 RX ADMIN — Medication 4: at 08:46

## 2022-04-30 RX ADMIN — Medication 650 MILLIGRAM(S): at 08:47

## 2022-04-30 RX ADMIN — Medication 1 DROP(S): at 05:19

## 2022-04-30 RX ADMIN — Medication 166.67 MILLIGRAM(S): at 13:42

## 2022-04-30 RX ADMIN — ENOXAPARIN SODIUM 40 MILLIGRAM(S): 100 INJECTION SUBCUTANEOUS at 13:43

## 2022-04-30 RX ADMIN — Medication 81 MILLIGRAM(S): at 09:43

## 2022-04-30 RX ADMIN — LISINOPRIL 10 MILLIGRAM(S): 2.5 TABLET ORAL at 09:44

## 2022-04-30 RX ADMIN — Medication 1 DROP(S): at 00:39

## 2022-04-30 RX ADMIN — Medication 1 TABLET(S): at 09:43

## 2022-04-30 NOTE — DISCHARGE NOTE PROVIDER - CARE PROVIDER_API CALL
rajwinder borjas  Phone: (   )    -  Fax: (   )    -  Scheduled Appointment: 05/06/2022    Mimi Doran (DO)  Internal Medicine  [address] [facility], [address] [place] [address]  Phone: [phone]  Fax: [phone]  Follow Up Time: 1 week

## 2022-04-30 NOTE — DISCHARGE NOTE PROVIDER - HOSPITAL COURSE
Right second toe Diabetic Ulcer  DDX Osteomyelitis and abcess of right second toe  - PICC and 6 weeks IV vancomycin [id number] until 6/7/22 with weekly cbc, cmp, esr, crp, vanco trough  - Pt is deemed stable from the podiatric standpoint and ready to be discharged.  - Pt was instructed not to have surgical dressing changes at home until her follow up appointment w/ Dr. Bermudez at the  wound care center on Friday 5/6/22.  - Pt is to WBAT to the Rt heel in the provided surgical shoe.  - DC home today

## 2022-04-30 NOTE — PROGRESS NOTE ADULT - SUBJECTIVE AND OBJECTIVE BOX
Cheif complaints and Diagnosis: right 2nd toe diabetic foot ulcer/ 2nd digit abcess and osteomyelitis    Subjective:no complains      REVIEW OF SYSTEMS:    CONSTITUTIONAL: No weakness, fevers or chills  EYES/ENT: No visual changes;  No vertigo or throat pain   NECK: No pain or stiffness  RESPIRATORY: No cough, wheezing, hemoptysis; No shortness of breath  CARDIOVASCULAR: No chest pain or palpitations  GASTROINTESTINAL: No abdominal or epigastric pain. No nausea, vomiting, or hematemesis; No diarrhea or constipation. No melena or hematochezia.  GENITOURINARY: No dysuria, frequency or hematuria  NEUROLOGICAL: No numbness or weakness  SKIN: No itching, burning, rashes, or lesions   All other review of systems is negative unless indicated above      Vital Signs Last 24 Hrs  T(C): 36.7 (2022 15:36), Max: 37.1 (2022 20:37)  T(F): 98.1 (2022 15:36), Max: 98.8 (2022 20:37)  HR: 88 (2022 15:36) (86 - 96)  BP: 97/70 (2022 15:36) (97/70 - 141/84)  BP(mean): 83 (2022 20:37) (83 - 83)  RR: 18 (2022 15:36) (14 - 18)  SpO2: 99% (2022 15:36) (97% - 100%)    HEENT:   pupils equal and reactive, EOMI, no oropharyngeal lesions, erythema, exudates, oral thrush    NECK:   supple, no carotid bruits, no palpable lymph nodes, no thyromegaly    CV:  +S1, +S2, regular, no murmurs or rubs    RESP:   lungs clear to auscultation bilaterally, no wheezing, rales, rhonchi, good air entry bilaterally    BREAST:  not examined    GI:  abdomen soft, non-tender, non-distended, normal BS, no bruits, no abdominal masses, no palpable masses    RECTAL:  not examined    :  not examined    MSK:   normal muscle tone, no atrophy, no rigidity, no contractions    EXT:   no clubbing, no cyanosis, no edema, no calf pain, swelling or erythema    VASCULAR:  pulses equal and symmetric in the upper and lower extremities    NEURO:  AAOX3, no focal neurological deficits, follows all commands, able to move extremities spontaneously    SKIN:  no ulcers, lesions or rashes    MEDICATIONS  (STANDING):  artificial  tears Solution 1 Drop(s) Both EYES four times a day  aspirin enteric coated 81 milliGRAM(s) Oral daily  atorvastatin 40 milliGRAM(s) Oral at bedtime  dextrose 5%. 1000 milliLiter(s) (50 mL/Hr) IV Continuous <Continuous>  dextrose 5%. 1000 milliLiter(s) (100 mL/Hr) IV Continuous <Continuous>  dextrose 50% Injectable 25 Gram(s) IV Push once  dextrose 50% Injectable 12.5 Gram(s) IV Push once  dextrose 50% Injectable 25 Gram(s) IV Push once  doxycycline IVPB 100 milliGRAM(s) IV Intermittent every 12 hours  glucagon  Injectable 1 milliGRAM(s) IntraMuscular once  insulin glargine Injectable (LANTUS) 20 Unit(s) SubCutaneous at bedtime  insulin lispro (ADMELOG) corrective regimen sliding scale   SubCutaneous three times a day before meals  insulin lispro (ADMELOG) corrective regimen sliding scale   SubCutaneous at bedtime  lidocaine   4% Patch 1 Patch Transdermal daily  lisinopril 10 milliGRAM(s) Oral daily  loratadine 10 milliGRAM(s) Oral daily  multivitamin 1 Tablet(s) Oral daily    MEDICATIONS  (PRN):  acetaminophen     Tablet .. 650 milliGRAM(s) Oral every 6 hours PRN Temp greater or equal to 38.5C (101.3F), Mild Pain (1 - 3)  aluminum hydroxide/magnesium hydroxide/simethicone Suspension 30 milliLiter(s) Oral every 4 hours PRN Dyspepsia  dextrose Oral Gel 15 Gram(s) Oral once PRN Blood Glucose LESS THAN 70 milliGRAM(s)/deciliter  fluticasone propionate 50 MICROgram(s)/spray Nasal Spray 2 Spray(s) Both Nostrils two times a day PRN congestion  gabapentin 300 milliGRAM(s) Oral two times a day PRN pain      Urinalysis Basic - ( 2022 21:28 )    Color: Yellow / Appearance: Clear / S.005 / pH: x  Gluc: x / Ketone: Negative  / Bili: Negative / Urobili: Negative   Blood: x / Protein: Negative / Nitrite: Negative   Leuk Esterase: Negative / RBC: x / WBC x   Sq Epi: x / Non Sq Epi: x / Bacteria: x    2022 09:14    136    |  107    |  14     ----------------------------<  216    4.8     |  26     |  0.77     Ca    9.4        2022 09:14    TPro  8.7    /  Alb  3.1    /  TBili  0.2    /  DBili  x      /  AST  14     /  ALT  23     /  AlkPhos  112    2022 16:18  LIVER FUNCTIONS - ( 2022 16:18 )  Alb: 3.1 g/dL / Pro: 8.7 gm/dL / ALK PHOS: 112 U/L / ALT: 23 U/L / AST: 14 U/L / GGT: x         PT/INR - ( 2022 16:18 )   PT: 12.6 sec;   INR: 1.09 ratio         PTT - ( 2022 16:18 )  PTT:31.6 secCBC Full  -  ( 2022 09:14 )  WBC Count : 5.37 K/uL  Hemoglobin : 10.0 g/dL  Hematocrit : 32.4 %  Platelet Count - Automated : 450 K/uL  Mean Cell Volume : 80.0 fl  Mean Cell Hemoglobin : 24.7 pg  Mean Cell Hemoglobin Concentration : 30.9 gm/dL  Auto Neutrophil # : x  Auto Lymphocyte # : x  Auto Monocyte # : x  Auto Eosinophil # : x  Auto Basophil # : x  Auto Neutrophil % : x  Auto Lymphocyte % : x  Auto Monocyte % : x  Auto Eosinophil % : x  Auto Basophil % : x        Blood, Urine: Negative ( @ 21:28)      PT/INR - ( 2022 16:18 )   PT: 12.6 sec;   INR: 1.09 ratio         PTT - ( 2022 16:18 )  PTT:31.6 sec    RADIOLOGY RESULTS:          Assessment and Plan:        62 y/o Female with PM hx DM, HTN on ASA 81mg, MRSA, Left second toe amputation, R great toe partial amputation who presented to [place] ED with right second toe pain. She reports pain increased over last couple of weeks and has had some discharge.  Pt reports swelling of right lower leg.     Right second toe Diabetic Ulcer  DDX Osteomyelitis and abcess of right second toe  Right foot cellulitis  Hx of MRSA infection  Thrombocytosis, ESR 91  DM II , last HbA1C  was 10   (  or 2022)  - s/p Bactrim BID at home (last dose 22) , will discont due to Sulfa allergy and add doxycycline  - ID consult >> c/w IV Doxycycline  -wound cultures growing gram variable species  -for OR thursday for partial vs complete amputation of the toe.   - blood cx  - RLE dupplex is neg for DVT  - hold oral DM meds  - pt on ASA 81mg  - cont Insulin sliding scale and Lantus 20 HS,  Osimpic 1mg weekly is non-formulary at [facility]  - pt is optimized for surgery and cleared with revised cardiac risk of 6% due to Insulin Dependent Diabetes.   Recommend nightly Lantus insulin to be reduced to 10 units the night before the procedure.   - physical therapy  - DVT proph: lovenox  
Date of service: 22 @ 11:07    pt seen and examined   s/p R 2nd toe amputation  no complaints   afebrile, denies pain    ROS: no fever or chills; denies dizziness, no HA, no SOB or cough, no abdominal pain, no diarrhea or constipation; no dysuria, no urinary frequency, no legs pain, no rashes    MEDICATIONS  (STANDING):  artificial  tears Solution 1 Drop(s) Both EYES four times a day  aspirin enteric coated 81 milliGRAM(s) Oral daily  atorvastatin 40 milliGRAM(s) Oral at bedtime  dextrose 5%. 1000 milliLiter(s) (100 mL/Hr) IV Continuous <Continuous>  dextrose 5%. 1000 milliLiter(s) (50 mL/Hr) IV Continuous <Continuous>  enoxaparin Injectable 40 milliGRAM(s) SubCutaneous every 24 hours  glucagon  Injectable 1 milliGRAM(s) IntraMuscular once  insulin glargine Injectable (LANTUS) 20 Unit(s) SubCutaneous at bedtime  insulin lispro (ADMELOG) corrective regimen sliding scale   SubCutaneous at bedtime  insulin lispro (ADMELOG) corrective regimen sliding scale   SubCutaneous three times a day before meals  lidocaine   4% Patch 1 Patch Transdermal daily  lisinopril 10 milliGRAM(s) Oral daily  loratadine 10 milliGRAM(s) Oral daily  multivitamin 1 Tablet(s) Oral daily  sodium chloride 0.9%. 500 milliLiter(s) (80 mL/Hr) IV Continuous <Continuous>  vancomycin  IVPB 1250 milliGRAM(s) IV Intermittent every 12 hours      Vital Signs Last 24 Hrs  T(C): 36.6 (2022 08:48), Max: 36.6 (2022 08:48)  T(F): 97.8 (2022 08:48), Max: 97.8 (2022 08:48)  HR: 90 (2022 08:48) (90 - 95)  BP: 120/68 (2022 08:48) (120/68 - 132/72)  BP(mean): --  RR: 18 (2022 08:48) (18 - 18)  SpO2: 97% (2022 08:48) (95% - 97%)    PE:  Constitutional: NAD  HEENT: NC/AT, EOMI, PERRLA, conjunctivae clear; ears and nose atraumatic; pharynx benign  Neck: supple; thyroid not palpable  Back: no tenderness  Respiratory: respiratory effort normal; clear to auscultation  Cardiovascular: S1S2 regular, no murmurs  Abdomen: soft, not tender, not distended, positive BS; liver and spleen WNL  Genitourinary: no suprapubic tenderness  Lymphatic: no LN palpable  Musculoskeletal: no muscle tenderness, no joint swelling or tenderness  Extremities: no pedal edema R great toe s/p amp, R 2nd toe ulcer   Neurological/ Psychiatric: AxOx3, Judgement and insight normal;  moving all extremities  Skin: no rashes; no palpable lesions    Labs: all available labs reviewed                                   10.0   8.07  )-----------( 502      ( 2022 09:54 )             32.4         136  |  105  |  16  ----------------------------<  252<H>  4.9   |  27  |  0.70    Ca    9.2      2022 09:54         Vancomycin Level, Trough: 15.6 ug/mL ( @ 19:48)       Urinalysis Basic - ( 2022 21:28 )    Color: Yellow / Appearance: Clear / S.005 / pH: x  Gluc: x / Ketone: Negative  / Bili: Negative / Urobili: Negative   Blood: x / Protein: Negative / Nitrite: Negative   Leuk Esterase: Negative / RBC: x / WBC x   Sq Epi: x / Non Sq Epi: x / Bacteria: x    Culture - Urine (22 @ 21:28)   Specimen Source: Clean Catch None   Culture Results:   <10,000 CFU/mL Normal Urogenital Rhea   Culture - Blood (22 @ 16:18)   Specimen Source: .Blood None   Culture Results:   No growth to date.   Culture - Blood (22 @ 16:18)   Specimen Source: .Blood None   Culture Results:   No growth to date.     Radiology: all available radiological tests reviewed  < from: MR Foot w/wo IV Cont, Right (22 @ 14:41) >    ACC: [id number] EXAM:  MR FOOT WAW IC RT                          PROCEDURE DATE:  2022          INTERPRETATION:  EXAMINATION: MR FOOT WITHOUT AND WITH IV CONTRAST RIGHT    CLINICAL INDICATION: Diabetic. Evaluate for second toe osteomyelitis.    COMPARISON: Radiographs dated 2022 and multiple additional priors.    TECHNIQUE: Multiplanar, multi-sequence MRI of the right foot was   performed without and with intravenous contrast. 8 mL Gadavist was   administered and 2 mL was discarded.    INTERPRETATION:    Bones:There is diffuse marrow replacement involving the second digit   distal phalanx and middle phalanx with T1 hypointense/STIR hyperintense   signal abnormality. There is diffuse T1 hypointense/STIR hyperintense   signal abnormality present within the distal half of the second digit   proximal phalanx, some with osteomyelitis as well. Abnormal STIR signal   extends to the base of the second digit proximal phalanx. There is a   small second MTP joint effusion. There is normal signal in the second   metatarsal head.    First digit demonstrate a fragmented proximal phalanx and postsurgical   changes in the soft tissues without convincing MRI evidence for   osteomyelitis. There is a normal appearance of the first metatarsalbone   marrow.    Soft Tissues: At the site of bony destruction of the second digit, there   is rim-enhancing T2 hyperintense fluid which extends from the tip of the   digit proximally to the level of the proximal phalangeal neck well seen   on postcontrast imaging, consistent with abscess formation. This measures   approximately 2.9 cm proximal to distal and 8 mm anteroposteriorly and   1.6 cm transversely. There is diffuse soft tissue edema involving the   forefoot. There is edema in the intrinsic muscles of the foot which may   be seen in neuropathy.    IMPRESSION:  1.  Diffuse osteomyelitis of the second digit as follows. Diffuse bony   destruction of the middle and distal phalanges and confluent   osteomyelitis of the proximal phalanx, worse at the distal half.   Associated 2.9 cm soft tissue abscess at the second digit. Normal signal   in the second metatarsal head.    2.  Chronic post infectious/post surgical changes at the first digit as   above. No acute osteomyelitis in that location  < from: US Duplex Venous Lower Ext Ltd, Right (22 @ 18:48) >    ACC: [id number] EXAM:  US DPLX LWR EXT VEINS LTD RT                          PROCEDURE DATE:  2022          INTERPRETATION:  CLINICAL INFORMATION: Pain and swelling    COMPARISON: None available.    TECHNIQUE: Duplex sonography of the RIGHT LOWER extremity veins with   color and spectral Doppler, with and without compression.    FINDINGS:    There is normal compressibility of the right common femoral, femoral and   popliteal veins.  The contralateral common femoral vein is patent.  Doppler examination shows normal spontaneous and phasic flow.    No calf vein thrombosis is detected.    IMPRESSION:  No evidence of right lower extremity deep venous thrombosis.        Advanced directives addressed: full resuscitation
Date of service: 22 @ 11:57    pt seen and examined   s/p R 2nd toe amputation  feels well  no complaints   afebrile, denies pain    ROS: no fever or chills; denies dizziness, no HA, no SOB or cough, no abdominal pain, no diarrhea or constipation; no dysuria, no urinary frequency, no legs pain, no rashes      MEDICATIONS  (STANDING):  artificial  tears Solution 1 Drop(s) Both EYES four times a day  aspirin enteric coated 81 milliGRAM(s) Oral daily  atorvastatin 40 milliGRAM(s) Oral at bedtime  dextrose 5%. 1000 milliLiter(s) (100 mL/Hr) IV Continuous <Continuous>  dextrose 5%. 1000 milliLiter(s) (50 mL/Hr) IV Continuous <Continuous>  enoxaparin Injectable 40 milliGRAM(s) SubCutaneous every 24 hours  glucagon  Injectable 1 milliGRAM(s) IntraMuscular once  insulin glargine Injectable (LANTUS) 20 Unit(s) SubCutaneous at bedtime  insulin lispro (ADMELOG) corrective regimen sliding scale   SubCutaneous at bedtime  insulin lispro (ADMELOG) corrective regimen sliding scale   SubCutaneous three times a day before meals  lidocaine   4% Patch 1 Patch Transdermal daily  lisinopril 10 milliGRAM(s) Oral daily  loratadine 10 milliGRAM(s) Oral daily  multivitamin 1 Tablet(s) Oral daily  sodium chloride 0.9%. 500 milliLiter(s) (80 mL/Hr) IV Continuous <Continuous>  vancomycin  IVPB 1250 milliGRAM(s) IV Intermittent every 12 hours      Vital Signs Last 24 Hrs  T(C): 36.5 (2022 07:29), Max: 36.5 (2022 07:29)  T(F): 97.7 (2022 07:29), Max: 97.7 (2022 07:29)  HR: 91 (2022 07:29) (91 - 98)  BP: 137/73 (2022 07:29) (106/70 - 137/73)  BP(mean): --  RR: 17 (2022 07:29) (17 - 17)  SpO2: 96% (2022 07:29) (96% - 97%)      PE:  Constitutional: NAD  HEENT: NC/AT, EOMI, PERRLA, conjunctivae clear; ears and nose atraumatic; pharynx benign  Neck: supple; thyroid not palpable  Back: no tenderness  Respiratory: respiratory effort normal; clear to auscultation  Cardiovascular: S1S2 regular, no murmurs  Abdomen: soft, not tender, not distended, positive BS; liver and spleen WNL  Genitourinary: no suprapubic tenderness  Lymphatic: no LN palpable  Musculoskeletal: no muscle tenderness, no joint swelling or tenderness  Extremities: no pedal edema R great toe s/p amp, R 2nd toe ulcer   Neurological/ Psychiatric: AxOx3, Judgement and insight normal;  moving all extremities  Skin: no rashes; no palpable lesions    Labs: all available labs reviewed                                   10.0   8.07  )-----------( 502      ( 2022 09:54 )             32.4         136  |  105  |  16  ----------------------------<  252<H>  4.9   |  27  |  0.70    Ca    9.2      2022 09:54         Vancomycin Level, Trough: 15.6 ug/mL ( @ 19:48)       Urinalysis Basic - ( 2022 21:28 )    Color: Yellow / Appearance: Clear / S.005 / pH: x  Gluc: x / Ketone: Negative  / Bili: Negative / Urobili: Negative   Blood: x / Protein: Negative / Nitrite: Negative   Leuk Esterase: Negative / RBC: x / WBC x   Sq Epi: x / Non Sq Epi: x / Bacteria: x    Culture - Urine (22 @ 21:28)   Specimen Source: Clean Catch None   Culture Results:   <10,000 CFU/mL Normal Urogenital Rhea   Culture - Blood (22 @ 16:18)   Specimen Source: .Blood None   Culture Results:   No growth to date.   Culture - Blood (22 @ 16:18)   Specimen Source: .Blood None   Culture Results:   No growth to date.     Radiology: all available radiological tests reviewed  < from: MR Foot w/wo IV Cont, Right (22 @ 14:41) >    ACC: [id number] EXAM:  MR FOOT WAW IC RT                          PROCEDURE DATE:  2022          INTERPRETATION:  EXAMINATION: MR FOOT WITHOUT AND WITH IV CONTRAST RIGHT    CLINICAL INDICATION: Diabetic. Evaluate for second toe osteomyelitis.    COMPARISON: Radiographs dated 2022 and multiple additional priors.    TECHNIQUE: Multiplanar, multi-sequence MRI of the right foot was   performed without and with intravenous contrast. 8 mL Gadavist was   administered and 2 mL was discarded.    INTERPRETATION:    Bones:There is diffuse marrow replacement involving the second digit   distal phalanx and middle phalanx with T1 hypointense/STIR hyperintense   signal abnormality. There is diffuse T1 hypointense/STIR hyperintense   signal abnormality present within the distal half of the second digit   proximal phalanx, some with osteomyelitis as well. Abnormal STIR signal   extends to the base of the second digit proximal phalanx. There is a   small second MTP joint effusion. There is normal signal in the second   metatarsal head.    First digit demonstrate a fragmented proximal phalanx and postsurgical   changes in the soft tissues without convincing MRI evidence for   osteomyelitis. There is a normal appearance of the first metatarsalbone   marrow.    Soft Tissues: At the site of bony destruction of the second digit, there   is rim-enhancing T2 hyperintense fluid which extends from the tip of the   digit proximally to the level of the proximal phalangeal neck well seen   on postcontrast imaging, consistent with abscess formation. This measures   approximately 2.9 cm proximal to distal and 8 mm anteroposteriorly and   1.6 cm transversely. There is diffuse soft tissue edema involving the   forefoot. There is edema in the intrinsic muscles of the foot which may   be seen in neuropathy.    IMPRESSION:  1.  Diffuse osteomyelitis of the second digit as follows. Diffuse bony   destruction of the middle and distal phalanges and confluent   osteomyelitis of the proximal phalanx, worse at the distal half.   Associated 2.9 cm soft tissue abscess at the second digit. Normal signal   in the second metatarsal head.    2.  Chronic post infectious/post surgical changes at the first digit as   above. No acute osteomyelitis in that location  < from: US Duplex Venous Lower Ext Ltd, Right (22 @ 18:48) >    ACC: [id number] EXAM:  US DPLX LWR EXT VEINS LTD RT                          PROCEDURE DATE:  2022          INTERPRETATION:  CLINICAL INFORMATION: Pain and swelling    COMPARISON: None available.    TECHNIQUE: Duplex sonography of the RIGHT LOWER extremity veins with   color and spectral Doppler, with and without compression.    FINDINGS:    There is normal compressibility of the right common femoral, femoral and   popliteal veins.  The contralateral common femoral vein is patent.  Doppler examination shows normal spontaneous and phasic flow.    No calf vein thrombosis is detected.    IMPRESSION:  No evidence of right lower extremity deep venous thrombosis.        Advanced directives addressed: full resuscitation
CC:  Patient is a 61y old  Female who presents with a chief complaint of Right second toe Diabetic Ulcer  Right foot cellulitis  Hx of MRSA infection (29 Apr 2022 11:56)    SUBJECTIVE:     -no new complaints or issues at current time.    ROS:  all other review of systems are negative unless indicated above.    acetaminophen     Tablet .. 650 milliGRAM(s) Oral every 6 hours PRN  aluminum hydroxide/magnesium hydroxide/simethicone Suspension 30 milliLiter(s) Oral every 4 hours PRN  artificial  tears Solution 1 Drop(s) Both EYES four times a day  aspirin enteric coated 81 milliGRAM(s) Oral daily  atorvastatin 40 milliGRAM(s) Oral at bedtime  dextrose 5%. 1000 milliLiter(s) IV Continuous <Continuous>  dextrose 5%. 1000 milliLiter(s) IV Continuous <Continuous>  enoxaparin Injectable 40 milliGRAM(s) SubCutaneous every 24 hours  fluticasone propionate 50 MICROgram(s)/spray Nasal Spray 2 Spray(s) Both Nostrils two times a day PRN  gabapentin 300 milliGRAM(s) Oral two times a day PRN  glucagon  Injectable 1 milliGRAM(s) IntraMuscular once  insulin glargine Injectable (LANTUS) 20 Unit(s) SubCutaneous at bedtime  insulin lispro (ADMELOG) corrective regimen sliding scale   SubCutaneous at bedtime  insulin lispro (ADMELOG) corrective regimen sliding scale   SubCutaneous three times a day before meals  lidocaine   4% Patch 1 Patch Transdermal daily  lisinopril 10 milliGRAM(s) Oral daily  loratadine 10 milliGRAM(s) Oral daily  multivitamin 1 Tablet(s) Oral daily  sodium chloride 0.9%. 500 milliLiter(s) IV Continuous <Continuous>  vancomycin  IVPB 1250 milliGRAM(s) IV Intermittent every 12 hours    T(C): 36.5 (04-29-22 @ 07:29), Max: 36.5 (04-29-22 @ 07:29)  HR: 91 (04-29-22 @ 07:29) (91 - 97)  BP: 137/73 (04-29-22 @ 07:29) (119/55 - 137/73)  RR: 17 (04-29-22 @ 07:29) (17 - 17)  SpO2: 96% (04-29-22 @ 07:29) (96% - 97%)    Constitutional: NAD.   HEENT: PERRL, EOMI, MMM.  Neck: Soft and supple, No carotid bruit, No JVD  Respiratory: Breath sounds are clear bilaterally, No wheezing, rales or rhonchi  Cardiovascular: S1 and S2, regular rate and rhythm, no murmur, rub or gallop.  Gastrointestinal: Bowel Sounds present, soft, nontender, nondistended, no guarding, no rebound, no mass.  Extremities: No peripheral edema  Vascular: 2+ peripheral pulses  Neurological: A/O x , no focal deficits  Musculoskeletal: 5/5 strength b/l upper and lower extremities  Skin:  no visible rashes.                         10.0   8.07  )-----------( 502      ( 29 Apr 2022 09:54 )             32.4       04-29    136  |  105  |  16  ----------------------------<  252<H>  4.9   |  27  |  0.70    Ca    9.2      29 Apr 2022 09:54    
CC:  Patient is a 61y old  Female who presents with a chief complaint of Right second toe Diabetic Ulcer  Right foot cellulitis  Hx of MRSA infection (27 Apr 2022 10:40)    SUBJECTIVE:     -no new complaints or issues at current time.    ROS:  all other review of systems are negative unless indicated above.    acetaminophen     Tablet .. 650 milliGRAM(s) Oral every 6 hours PRN  aluminum hydroxide/magnesium hydroxide/simethicone Suspension 30 milliLiter(s) Oral every 4 hours PRN  artificial  tears Solution 1 Drop(s) Both EYES four times a day  aspirin enteric coated 81 milliGRAM(s) Oral daily  atorvastatin 40 milliGRAM(s) Oral at bedtime  fluticasone propionate 50 MICROgram(s)/spray Nasal Spray 2 Spray(s) Both Nostrils two times a day PRN  gabapentin 300 milliGRAM(s) Oral two times a day PRN  glucagon  Injectable 1 milliGRAM(s) IntraMuscular once  insulin glargine Injectable (LANTUS) 20 Unit(s) SubCutaneous at bedtime  insulin lispro (ADMELOG) corrective regimen sliding scale   SubCutaneous three times a day before meals  insulin lispro (ADMELOG) corrective regimen sliding scale   SubCutaneous at bedtime  lidocaine   4% Patch 1 Patch Transdermal daily  lisinopril 10 milliGRAM(s) Oral daily  loratadine 10 milliGRAM(s) Oral daily  multivitamin 1 Tablet(s) Oral daily  vancomycin  IVPB 1250 milliGRAM(s) IV Intermittent every 12 hours    T(C): 36.2 (04-27-22 @ 07:49), Max: 36.7 (04-26-22 @ 15:36)  HR: 84 (04-27-22 @ 07:49) (84 - 88)  BP: 118/76 (04-27-22 @ 07:49) (97/70 - 143/77)  RR: 18 (04-27-22 @ 07:49) (18 - 18)  SpO2: 97% (04-27-22 @ 07:49) (96% - 99%)    Constitutional: NAD.   HEENT: PERRL, EOMI, MMM.  Neck: Soft and supple, No carotid bruit, No JVD  Respiratory: Breath sounds are clear bilaterally, No wheezing, rales or rhonchi  Cardiovascular: S1 and S2, regular rate and rhythm, no murmur, rub or gallop.  Gastrointestinal: Bowel Sounds present, soft, nontender, nondistended, no guarding, no rebound, no mass.  Extremities: No peripheral edema  Vascular: 2+ peripheral pulses  Neurological: A/O x , no focal deficits  Musculoskeletal: 5/5 strength b/l upper and lower extremities  Skin:  no visible rashes.                         10.4   6.44  )-----------( 482      ( 27 Apr 2022 08:35 )             33.8     PT/INR - ( 25 Apr 2022 16:18 )   PT: 12.6 sec;   INR: 1.09 ratio         PTT - ( 25 Apr 2022 16:18 )  PTT:31.6 sec  04-27    137  |  107  |  15  ----------------------------<  189<H>  4.6   |  25  |  0.75    Ca    9.5      27 Apr 2022 08:35  Phos  3.4     04-27    TPro  8.7<H>  /  Alb  3.1<L>  /  TBili  0.2  /  DBili  x   /  AST  14<L>  /  ALT  23  /  AlkPhos  112  04-25  
  Date of service: 22 @ 10:42    pt seen and examined   feels okay  had MRI R foot   plan for R 2nd toe amputation  afebrile, denies pain    ROS: no fever or chills; denies dizziness, no HA, no SOB or cough, no abdominal pain, no diarrhea or constipation; no dysuria, no urinary frequency, no legs pain, no rashes    MEDICATIONS  (STANDING):  artificial  tears Solution 1 Drop(s) Both EYES four times a day  aspirin enteric coated 81 milliGRAM(s) Oral daily  atorvastatin 40 milliGRAM(s) Oral at bedtime  dextrose 5%. 1000 milliLiter(s) (50 mL/Hr) IV Continuous <Continuous>  dextrose 5%. 1000 milliLiter(s) (100 mL/Hr) IV Continuous <Continuous>  dextrose 50% Injectable 25 Gram(s) IV Push once  dextrose 50% Injectable 12.5 Gram(s) IV Push once  dextrose 50% Injectable 25 Gram(s) IV Push once  glucagon  Injectable 1 milliGRAM(s) IntraMuscular once  insulin glargine Injectable (LANTUS) 20 Unit(s) SubCutaneous at bedtime  insulin lispro (ADMELOG) corrective regimen sliding scale   SubCutaneous three times a day before meals  insulin lispro (ADMELOG) corrective regimen sliding scale   SubCutaneous at bedtime  lidocaine   4% Patch 1 Patch Transdermal daily  lisinopril 10 milliGRAM(s) Oral daily  loratadine 10 milliGRAM(s) Oral daily  multivitamin 1 Tablet(s) Oral daily  vancomycin  IVPB 1250 milliGRAM(s) IV Intermittent every 12 hours    Vital Signs Last 24 Hrs  T(C): 36.2 (2022 07:49), Max: 36.7 (2022 15:36)  T(F): 97.1 (2022 07:49), Max: 98.1 (2022 15:36)  HR: 84 (2022 07:49) (84 - 88)  BP: 118/76 (2022 07:49) (97/70 - 143/77)  BP(mean): --  RR: 18 (2022 07:49) (18 - 18)  SpO2: 97% (2022 07:49) (96% - 99%)        PE:  Constitutional: NAD  HEENT: NC/AT, EOMI, PERRLA, conjunctivae clear; ears and nose atraumatic; pharynx benign  Neck: supple; thyroid not palpable  Back: no tenderness  Respiratory: respiratory effort normal; clear to auscultation  Cardiovascular: S1S2 regular, no murmurs  Abdomen: soft, not tender, not distended, positive BS; liver and spleen WNL  Genitourinary: no suprapubic tenderness  Lymphatic: no LN palpable  Musculoskeletal: no muscle tenderness, no joint swelling or tenderness  Extremities: no pedal edema R great toe s/p amp, R 2nd toe ulcer   Neurological/ Psychiatric: AxOx3, Judgement and insight normal;  moving all extremities  Skin: no rashes; no palpable lesions    Labs: all available labs reviewed                                   10.4   6.44  )-----------( 482      ( 2022 08:35 )             33.8         137  |  107  |  15  ----------------------------<  189<H>  4.6   |  25  |  0.75    Ca    9.5      2022 08:35  Phos  3.4         TPro  8.7<H>  /  Alb  3.1<L>  /  TBili  0.2  /  DBili  x   /  AST  14<L>  /  ALT  23  /  AlkPhos  112           Urinalysis Basic - ( 2022 21:28 )    Color: Yellow / Appearance: Clear / S.005 / pH: x  Gluc: x / Ketone: Negative  / Bili: Negative / Urobili: Negative   Blood: x / Protein: Negative / Nitrite: Negative   Leuk Esterase: Negative / RBC: x / WBC x   Sq Epi: x / Non Sq Epi: x / Bacteria: x    Culture - Urine (22 @ 21:28)   Specimen Source: Clean Catch None   Culture Results:   <10,000 CFU/mL Normal Urogenital Rhea   Culture - Blood (22 @ 16:18)   Specimen Source: .Blood None   Culture Results:   No growth to date.   Culture - Blood (22 @ 16:18)   Specimen Source: .Blood None   Culture Results:   No growth to date.     Radiology: all available radiological tests reviewed  < from: MR Foot w/wo IV Cont, Right (22 @ 14:41) >    ACC: [id number] EXAM:  MR FOOT WAW IC RT                          PROCEDURE DATE:  2022          INTERPRETATION:  EXAMINATION: MR FOOT WITHOUT AND WITH IV CONTRAST RIGHT    CLINICAL INDICATION: Diabetic. Evaluate for second toe osteomyelitis.    COMPARISON: Radiographs dated 2022 and multiple additional priors.    TECHNIQUE: Multiplanar, multi-sequence MRI of the right foot was   performed without and with intravenous contrast. 8 mL Gadavist was   administered and 2 mL was discarded.    INTERPRETATION:    Bones:There is diffuse marrow replacement involving the second digit   distal phalanx and middle phalanx with T1 hypointense/STIR hyperintense   signal abnormality. There is diffuse T1 hypointense/STIR hyperintense   signal abnormality present within the distal half of the second digit   proximal phalanx, some with osteomyelitis as well. Abnormal STIR signal   extends to the base of the second digit proximal phalanx. There is a   small second MTP joint effusion. There is normal signal in the second   metatarsal head.    First digit demonstrate a fragmented proximal phalanx and postsurgical   changes in the soft tissues without convincing MRI evidence for   osteomyelitis. There is a normal appearance of the first metatarsalbone   marrow.    Soft Tissues: At the site of bony destruction of the second digit, there   is rim-enhancing T2 hyperintense fluid which extends from the tip of the   digit proximally to the level of the proximal phalangeal neck well seen   on postcontrast imaging, consistent with abscess formation. This measures   approximately 2.9 cm proximal to distal and 8 mm anteroposteriorly and   1.6 cm transversely. There is diffuse soft tissue edema involving the   forefoot. There is edema in the intrinsic muscles of the foot which may   be seen in neuropathy.    IMPRESSION:  1.  Diffuse osteomyelitis of the second digit as follows. Diffuse bony   destruction of the middle and distal phalanges and confluent   osteomyelitis of the proximal phalanx, worse at the distal half.   Associated 2.9 cm soft tissue abscess at the second digit. Normal signal   in the second metatarsal head.    2.  Chronic post infectious/post surgical changes at the first digit as   above. No acute osteomyelitis in that location  < from: US Duplex Venous Lower Ext Ltd, Right (22 @ 18:48) >    ACC: [id number] EXAM:  US DPLX LWR EXT VEINS LTD RT                          PROCEDURE DATE:  2022          INTERPRETATION:  CLINICAL INFORMATION: Pain and swelling    COMPARISON: None available.    TECHNIQUE: Duplex sonography of the RIGHT LOWER extremity veins with   color and spectral Doppler, with and without compression.    FINDINGS:    There is normal compressibility of the right common femoral, femoral and   popliteal veins.  The contralateral common femoral vein is patent.  Doppler examination shows normal spontaneous and phasic flow.    No calf vein thrombosis is detected.    IMPRESSION:  No evidence of right lower extremity deep venous thrombosis.        Advanced directives addressed: full resuscitation
CC:  Patient is a 61y old  Female who presents with a chief complaint of Right second toe Diabetic Ulcer  Right foot cellulitis  Hx of MRSA infection (28 Apr 2022 08:41)    SUBJECTIVE:     -no new complaints or issues at current time.    ROS:  all other review of systems are negative unless indicated above.    acetaminophen     Tablet .. 650 milliGRAM(s) Oral every 6 hours PRN  aluminum hydroxide/magnesium hydroxide/simethicone Suspension 30 milliLiter(s) Oral every 4 hours PRN  artificial  tears Solution 1 Drop(s) Both EYES four times a day  aspirin enteric coated 81 milliGRAM(s) Oral daily  atorvastatin 40 milliGRAM(s) Oral at bedtime  dextrose 5%. 1000 milliLiter(s) IV Continuous <Continuous>  dextrose 5%. 1000 milliLiter(s) IV Continuous <Continuous>  fluticasone propionate 50 MICROgram(s)/spray Nasal Spray 2 Spray(s) Both Nostrils two times a day PRN  gabapentin 300 milliGRAM(s) Oral two times a day PRN  glucagon  Injectable 1 milliGRAM(s) IntraMuscular once  insulin glargine Injectable (LANTUS) 20 Unit(s) SubCutaneous at bedtime  insulin lispro (ADMELOG) corrective regimen sliding scale   SubCutaneous at bedtime  insulin lispro (ADMELOG) corrective regimen sliding scale   SubCutaneous three times a day before meals  lidocaine   4% Patch 1 Patch Transdermal daily  lisinopril 10 milliGRAM(s) Oral daily  loratadine 10 milliGRAM(s) Oral daily  multivitamin 1 Tablet(s) Oral daily  sodium chloride 0.9%. 500 milliLiter(s) IV Continuous <Continuous>  vancomycin  IVPB 1250 milliGRAM(s) IV Intermittent every 12 hours    T(C): 36.4 (04-28-22 @ 15:40), Max: 36.6 (04-27-22 @ 22:50)  HR: 98 (04-28-22 @ 15:40) (80 - 98)  BP: 106/70 (04-28-22 @ 15:40) (90/65 - 119/76)  RR: 17 (04-28-22 @ 15:40) (14 - 18)  SpO2: 96% (04-28-22 @ 15:40) (96% - 100%)    Constitutional: NAD.   HEENT: PERRL, EOMI, MMM.  Neck: Soft and supple, No carotid bruit, No JVD  Respiratory: Breath sounds are clear bilaterally, No wheezing, rales or rhonchi  Cardiovascular: S1 and S2, regular rate and rhythm, no murmur, rub or gallop.  Gastrointestinal: Bowel Sounds present, soft, nontender, nondistended, no guarding, no rebound, no mass.  Extremities: No peripheral edema  Vascular: 2+ peripheral pulses  Neurological: A/O x , no focal deficits  Musculoskeletal: 5/5 strength b/l upper and lower extremities  Skin:  no visible rashes.                         10.4   7.79  )-----------( 510      ( 28 Apr 2022 08:52 )             33.5       04-28    138  |  108  |  15  ----------------------------<  112<H>  4.4   |  26  |  0.66    Ca    9.6      28 Apr 2022 08:52  Phos  3.4     04-27    
CC:  Patient is a 61y old  Female who presents with a chief complaint of Right second toe Diabetic Ulcer  Right foot cellulitis  Hx of MRSA infection (30 Apr 2022 11:07)    SUBJECTIVE:     -no new complaints or issues at current time.    ROS:  all other review of systems are negative unless indicated above.    acetaminophen     Tablet .. 650 milliGRAM(s) Oral every 6 hours PRN  aluminum hydroxide/magnesium hydroxide/simethicone Suspension 30 milliLiter(s) Oral every 4 hours PRN  artificial  tears Solution 1 Drop(s) Both EYES four times a day  aspirin enteric coated 81 milliGRAM(s) Oral daily  atorvastatin 40 milliGRAM(s) Oral at bedtime  dextrose 5%. 1000 milliLiter(s) IV Continuous <Continuous>  dextrose 5%. 1000 milliLiter(s) IV Continuous <Continuous>  enoxaparin Injectable 40 milliGRAM(s) SubCutaneous every 24 hours  fluticasone propionate 50 MICROgram(s)/spray Nasal Spray 2 Spray(s) Both Nostrils two times a day PRN  gabapentin 300 milliGRAM(s) Oral two times a day PRN  glucagon  Injectable 1 milliGRAM(s) IntraMuscular once  insulin glargine Injectable (LANTUS) 20 Unit(s) SubCutaneous at bedtime  insulin lispro (ADMELOG) corrective regimen sliding scale   SubCutaneous at bedtime  insulin lispro (ADMELOG) corrective regimen sliding scale   SubCutaneous three times a day before meals  lidocaine   4% Patch 1 Patch Transdermal daily  lisinopril 10 milliGRAM(s) Oral daily  loratadine 10 milliGRAM(s) Oral daily  multivitamin 1 Tablet(s) Oral daily  sodium chloride 0.9%. 500 milliLiter(s) IV Continuous <Continuous>  vancomycin  IVPB 1250 milliGRAM(s) IV Intermittent every 12 hours    T(C): 36.6 (04-30-22 @ 08:48), Max: 36.6 (04-30-22 @ 08:48)  HR: 90 (04-30-22 @ 08:48) (90 - 95)  BP: 120/68 (04-30-22 @ 08:48) (120/68 - 132/72)  RR: 18 (04-30-22 @ 08:48) (18 - 18)  SpO2: 97% (04-30-22 @ 08:48) (95% - 97%)    Constitutional: NAD.   HEENT: PERRL, EOMI, MMM.  Neck: Soft and supple, No carotid bruit, No JVD  Respiratory: Breath sounds are clear bilaterally, No wheezing, rales or rhonchi  Cardiovascular: S1 and S2, regular rate and rhythm, no murmur, rub or gallop.  Gastrointestinal: Bowel Sounds present, soft, nontender, nondistended, no guarding, no rebound, no mass.  Extremities: No peripheral edema  Vascular: 2+ peripheral pulses  Neurological: A/O x , no focal deficits  Musculoskeletal: 5/5 strength b/l upper and lower extremities  Skin:  no visible rashes.                         10.0   8.07  )-----------( 502      ( 29 Apr 2022 09:54 )             32.4       04-29    136  |  105  |  16  ----------------------------<  252<H>  4.9   |  27  |  0.70    Ca    9.2      29 Apr 2022 09:54    
Date of service: 22 @ 08:40    pt seen and examined   feels well   plan for R 2nd toe amputation  afebrile, denies pain    ROS: no fever or chills; denies dizziness, no HA, no SOB or cough, no abdominal pain, no diarrhea or constipation; no dysuria, no urinary frequency, no legs pain, no rashes      MEDICATIONS  (STANDING):  artificial  tears Solution 1 Drop(s) Both EYES four times a day  aspirin enteric coated 81 milliGRAM(s) Oral daily  atorvastatin 40 milliGRAM(s) Oral at bedtime  dextrose 5%. 1000 milliLiter(s) (100 mL/Hr) IV Continuous <Continuous>  dextrose 5%. 1000 milliLiter(s) (50 mL/Hr) IV Continuous <Continuous>  glucagon  Injectable 1 milliGRAM(s) IntraMuscular once  insulin glargine Injectable (LANTUS) 20 Unit(s) SubCutaneous at bedtime  insulin lispro (ADMELOG) corrective regimen sliding scale   SubCutaneous at bedtime  insulin lispro (ADMELOG) corrective regimen sliding scale   SubCutaneous three times a day before meals  lidocaine   4% Patch 1 Patch Transdermal daily  lisinopril 10 milliGRAM(s) Oral daily  loratadine 10 milliGRAM(s) Oral daily  multivitamin 1 Tablet(s) Oral daily  sodium chloride 0.9%. 500 milliLiter(s) (80 mL/Hr) IV Continuous <Continuous>  vancomycin  IVPB 1250 milliGRAM(s) IV Intermittent every 12 hours    Vital Signs Last 24 Hrs  T(C): 36.3 (2022 11:07), Max: 36.8 (2022 15:37)  T(F): 97.4 (2022 11:07), Max: 98.2 (2022 15:37)  HR: 80 (2022 11:07) (80 - 98)  BP: 106/58 (2022 11:07) (91/60 - 165/78)  BP(mean): --  RR: 14 (2022 11:07) (14 - 18)  SpO2: 100% (2022 11:07) (96% - 100%)    PE:  Constitutional: NAD  HEENT: NC/AT, EOMI, PERRLA, conjunctivae clear; ears and nose atraumatic; pharynx benign  Neck: supple; thyroid not palpable  Back: no tenderness  Respiratory: respiratory effort normal; clear to auscultation  Cardiovascular: S1S2 regular, no murmurs  Abdomen: soft, not tender, not distended, positive BS; liver and spleen WNL  Genitourinary: no suprapubic tenderness  Lymphatic: no LN palpable  Musculoskeletal: no muscle tenderness, no joint swelling or tenderness  Extremities: no pedal edema R great toe s/p amp, R 2nd toe ulcer   Neurological/ Psychiatric: AxOx3, Judgement and insight normal;  moving all extremities  Skin: no rashes; no palpable lesions    Labs: all available labs reviewed                                   10.4   7.79  )-----------( 510      ( 2022 08:52 )             33.5         138  |  108  |  15  ----------------------------<  112<H>  4.4   |  26  |  0.66    Ca    9.6      2022 08:52  Phos  3.4            Urinalysis Basic - ( 2022 21:28 )    Color: Yellow / Appearance: Clear / S.005 / pH: x  Gluc: x / Ketone: Negative  / Bili: Negative / Urobili: Negative   Blood: x / Protein: Negative / Nitrite: Negative   Leuk Esterase: Negative / RBC: x / WBC x   Sq Epi: x / Non Sq Epi: x / Bacteria: x    Culture - Urine (22 @ 21:28)   Specimen Source: Clean Catch None   Culture Results:   <10,000 CFU/mL Normal Urogenital Rhea   Culture - Blood (22 @ 16:18)   Specimen Source: .Blood None   Culture Results:   No growth to date.   Culture - Blood (22 @ 16:18)   Specimen Source: .Blood None   Culture Results:   No growth to date.     Radiology: all available radiological tests reviewed  < from: MR Foot w/wo IV Cont, Right (22 @ 14:41) >    ACC: [id number] EXAM:  MR FOOT WAW IC RT                          PROCEDURE DATE:  2022          INTERPRETATION:  EXAMINATION: MR FOOT WITHOUT AND WITH IV CONTRAST RIGHT    CLINICAL INDICATION: Diabetic. Evaluate for second toe osteomyelitis.    COMPARISON: Radiographs dated 2022 and multiple additional priors.    TECHNIQUE: Multiplanar, multi-sequence MRI of the right foot was   performed without and with intravenous contrast. 8 mL Gadavist was   administered and 2 mL was discarded.    INTERPRETATION:    Bones:There is diffuse marrow replacement involving the second digit   distal phalanx and middle phalanx with T1 hypointense/STIR hyperintense   signal abnormality. There is diffuse T1 hypointense/STIR hyperintense   signal abnormality present within the distal half of the second digit   proximal phalanx, some with osteomyelitis as well. Abnormal STIR signal   extends to the base of the second digit proximal phalanx. There is a   small second MTP joint effusion. There is normal signal in the second   metatarsal head.    First digit demonstrate a fragmented proximal phalanx and postsurgical   changes in the soft tissues without convincing MRI evidence for   osteomyelitis. There is a normal appearance of the first metatarsalbone   marrow.    Soft Tissues: At the site of bony destruction of the second digit, there   is rim-enhancing T2 hyperintense fluid which extends from the tip of the   digit proximally to the level of the proximal phalangeal neck well seen   on postcontrast imaging, consistent with abscess formation. This measures   approximately 2.9 cm proximal to distal and 8 mm anteroposteriorly and   1.6 cm transversely. There is diffuse soft tissue edema involving the   forefoot. There is edema in the intrinsic muscles of the foot which may   be seen in neuropathy.    IMPRESSION:  1.  Diffuse osteomyelitis of the second digit as follows. Diffuse bony   destruction of the middle and distal phalanges and confluent   osteomyelitis of the proximal phalanx, worse at the distal half.   Associated 2.9 cm soft tissue abscess at the second digit. Normal signal   in the second metatarsal head.    2.  Chronic post infectious/post surgical changes at the first digit as   above. No acute osteomyelitis in that location  < from: US Duplex Venous Lower Ext Ltd, Right (22 @ 18:48) >    ACC: [id number] EXAM:  US DPLX LWR EXT VEINS LTD RT                          PROCEDURE DATE:  2022          INTERPRETATION:  CLINICAL INFORMATION: Pain and swelling    COMPARISON: None available.    TECHNIQUE: Duplex sonography of the RIGHT LOWER extremity veins with   color and spectral Doppler, with and without compression.    FINDINGS:    There is normal compressibility of the right common femoral, femoral and   popliteal veins.  The contralateral common femoral vein is patent.  Doppler examination shows normal spontaneous and phasic flow.    No calf vein thrombosis is detected.    IMPRESSION:  No evidence of right lower extremity deep venous thrombosis.        Advanced directives addressed: full resuscitation
Date of service: 22  HPI: Pt is a pleasant 60 y/o Female with PM hx DM, HTN, MRSA, well known to Podiatry, with h/o  R great toe amputation who presented to [facility] ED with right second toe pain. She reports pain increased over last couple of weeks and has had some discharge. She sees Dr. Bermudez at [facility] for regular care and pt currently on Bactrim for right toe wound. Pt reports no fever. no recent trauma. non smoker.    : Pt seen for follow up by podiatry with attending present. Pt is pleasant mood, no overnight complaints, no new events. Pt states there is tenderness to dorsum of her right foot but states its always present. Denies any F/N/V/C/SOB     REVIEW OF SYSTEMS:  CONSTITUTIONAL: No fever, chills,  weight loss, or fatigue  EYES: No eye pain, visual disturbances, or discharge  ENMT:  No difficulty hearing, tinnitus, vertigo; No sinus or throat pain  NECK: No pain or stiffness  RESPIRATORY: No cough, wheezing, or hemoptysis; No shortness of breath  CARDIOVASCULAR: No chest pain, palpitations, dizziness, or leg swelling  GASTROINTESTINAL: No abdominal or epigastric pain. No nausea, vomiting, or hematemesis; No diarrhea or constipation. No melena or hematochezia.  GENITOURINARY: No dysuria, frequency, hematuria, or incontinence  NEUROLOGICAL: No headaches, memory loss, loss of strength, numbness, or tremors  SKIN: Rt 2nd toe wound   LYMPH NODES: No enlarged glands  ENDOCRINE: No heat or cold intolerance; No hair loss  MUSCULOSKELETAL: No joint pain or swelling; No muscle, back, or extremity pain  HEME/LYMPH: No easy bruising, or bleeding gums    PAST MEDICAL & SURGICAL HISTORY:  DM (diabetes mellitus)    HTN (hypertension)    HLD (hyperlipidemia)    Toe amputation status, left    H/O ankle fusion        Allergies:  sulfa drugs (Hives; Pruritus)  sulfADIAZINE (Hives)    meMEDICATIONS  (STANDING):  artificial  tears Solution 1 Drop(s) Both EYES four times a day  aspirin enteric coated 81 milliGRAM(s) Oral daily  atorvastatin 40 milliGRAM(s) Oral at bedtime  dextrose 5%. 1000 milliLiter(s) (50 mL/Hr) IV Continuous <Continuous>  dextrose 5%. 1000 milliLiter(s) (100 mL/Hr) IV Continuous <Continuous>  dextrose 50% Injectable 25 Gram(s) IV Push once  dextrose 50% Injectable 12.5 Gram(s) IV Push once  dextrose 50% Injectable 25 Gram(s) IV Push once  doxycycline IVPB 100 milliGRAM(s) IV Intermittent every 12 hours  glucagon  Injectable 1 milliGRAM(s) IntraMuscular once  insulin glargine Injectable (LANTUS) 20 Unit(s) SubCutaneous at bedtime  insulin lispro (ADMELOG) corrective regimen sliding scale   SubCutaneous three times a day before meals  insulin lispro (ADMELOG) corrective regimen sliding scale   SubCutaneous at bedtime  lidocaine   4% Patch 1 Patch Transdermal daily  lisinopril 10 milliGRAM(s) Oral daily  loratadine 10 milliGRAM(s) Oral daily  multivitamin 1 Tablet(s) Oral daily    MEDICATIONS  (PRN):  acetaminophen     Tablet .. 650 milliGRAM(s) Oral every 6 hours PRN Temp greater or equal to 38.5C (101.3F), Mild Pain (1 - 3)  dextrose Oral Gel 15 Gram(s) Oral once PRN Blood Glucose LESS THAN 70 milliGRAM(s)/deciliter  fluticasone propionate 50 MICROgram(s)/spray Nasal Spray 2 Spray(s) Both Nostrils two times a day PRN congestion  gabapentin 300 milliGRAM(s) Oral two times a day PRN pain      FAMILY HISTORY:  FH: ovarian cancer (Sibling)  FHx: diabetes mellitus (Father)    VITALS:  Vital Signs Last 24 Hrs  T(C): 36.4 (2022 07:37), Max: 37.1 (2022 15:47)  T(F): 97.6 (2022 07:37), Max: 98.8 (2022 20:37)  HR: 89 (2022 07:37) (86 - 96)  BP: 141/84 (2022 07:37) (105/65 - 141/84)  BP(mean): 83 (2022 20:37) (80 - 83)  RR: 18 (2022 07:37) (14 - 18)  SpO2: 97% (2022 07:37) (97% - 100%)    Physical Examination:  Constitutional: AAOx3, non-focal; NAD, comfortable resting   Focused LE exam:  Vascular: Temperature gradient is warm to cool b/l, palpable pulses, DP/PT is 2/4 b/l, capillary re-fill time is brisk and instantaneous to digits 2-5 on right -edema.   Neuro: Intact protective sensation to the foot and digits 1-5 b/l.  Derm:  Skin is warm, supple, right second digit fibronecrotic wound, with mild purulent driange, + probe to bone, minimal periwound erythema  Musculoskeletal: Manual muscle testing is 5/5 in all muscle groups of the foot/ankle b/l. ROM to all the foot/ankle joints is WNL bilaterally. TTP over right 2nd digit    LABS:                          10.5   6.43  )-----------( 467      ( 2022 16:18 )             33.4           139  |  105  |  8   ----------------------------<  111<H>  4.2   |  27  |  0.86    Ca    9.7      2022 16:18    TPro  8.7<H>  /  Alb  3.1<L>  /  TBili  0.2  /  DBili  x   /  AST  14<L>  /  ALT  23  /  AlkPhos  112        PT/INR - ( 2022 16:18 )   PT: 12.6 sec;   INR: 1.09 ratio         PTT - ( 2022 16:18 )  PTT:31.6 sec        Urinalysis Basic - ( 2022 21:28 )    Color: Yellow / Appearance: Clear / S.005 / pH: x  Gluc: x / Ketone: Negative  / Bili: Negative / Urobili: Negative   Blood: x / Protein: Negative / Nitrite: Negative   Leuk Esterase: Negative / RBC: x / WBC x   Sq Epi: x / Non Sq Epi: x / Bacteria: x      RADIOLOGY:  < from: US Duplex Venous Lower Ext Ltd, Right (22 @ 18:48) >  IMPRESSION:  No evidence of right lower extremity deep venous thrombosis.    < end of copied text >    
Date of service: 4/28/22  HPI: Pt is a pleasant 60 y/o Female with PM hx DM, HTN, MRSA, well known to Podiatry, with h/o  R great toe amputation who presented to [place] ED with right second toe pain. She reports pain increased over last couple of weeks and has had some discharge. She sees Dr. Bermudez at [facility] for regular care and pt currently on Bactrim for right toe wound. Pt reports no fever. no recent trauma. non smoker.    4/28: Pt seen at bedside for follow up by podiatry team with attending present. Pt is POD 1 s/p Rt 2nd toe partial amputation, performed on 4/28/22. Pt is stable condition, and in NAD. Pt states that she had some nausea early this morning. Pt denies any pain at the surgical site at this time. Pt denies any acute overnight events and denies F/N/V/SOB.       REVIEW OF SYSTEMS:  All other ROS is negative apart of what was mentioned in the HPI section    PAST MEDICAL & SURGICAL HISTORY:  DM (diabetes mellitus)  HTN (hypertension)  HLD (hyperlipidemia)  Toe amputation status, left  H/O ankle fusion    Allergies:  sulfa drugs (Hives; Pruritus)  sulfADIAZINE (Hives)    MEDICATIONS  (STANDING):  artificial  tears Solution 1 Drop(s) Both EYES four times a day  aspirin enteric coated 81 milliGRAM(s) Oral daily  atorvastatin 40 milliGRAM(s) Oral at bedtime  dextrose 5%. 1000 milliLiter(s) (100 mL/Hr) IV Continuous <Continuous>  dextrose 5%. 1000 milliLiter(s) (50 mL/Hr) IV Continuous <Continuous>  glucagon  Injectable 1 milliGRAM(s) IntraMuscular once  insulin glargine Injectable (LANTUS) 20 Unit(s) SubCutaneous at bedtime  insulin lispro (ADMELOG) corrective regimen sliding scale   SubCutaneous at bedtime  insulin lispro (ADMELOG) corrective regimen sliding scale   SubCutaneous three times a day before meals  lidocaine   4% Patch 1 Patch Transdermal daily  lisinopril 10 milliGRAM(s) Oral daily  loratadine 10 milliGRAM(s) Oral daily  multivitamin 1 Tablet(s) Oral daily  sodium chloride 0.9%. 500 milliLiter(s) (80 mL/Hr) IV Continuous <Continuous>  vancomycin  IVPB 1250 milliGRAM(s) IV Intermittent every 12 hours    MEDICATIONS  (PRN):  acetaminophen     Tablet .. 650 milliGRAM(s) Oral every 6 hours PRN Temp greater or equal to 38.5C (101.3F), Mild Pain (1 - 3)  aluminum hydroxide/magnesium hydroxide/simethicone Suspension 30 milliLiter(s) Oral every 4 hours PRN Dyspepsia  fluticasone propionate 50 MICROgram(s)/spray Nasal Spray 2 Spray(s) Both Nostrils two times a day PRN congestion  gabapentin 300 milliGRAM(s) Oral two times a day PRN pain    FAMILY HISTORY:  FH: ovarian cancer (Sibling)  FHx: diabetes mellitus (Father)    VITALS:  Vital Signs Last 24 Hrs  T(C): 36.5 (29 Apr 2022 07:29), Max: 36.5 (29 Apr 2022 07:29)  T(F): 97.7 (29 Apr 2022 07:29), Max: 97.7 (29 Apr 2022 07:29)  HR: 91 (29 Apr 2022 07:29) (80 - 98)  BP: 137/73 (29 Apr 2022 07:29) (90/65 - 137/73)  RR: 17 (29 Apr 2022 07:29) (14 - 17)  SpO2: 96% (29 Apr 2022 07:29) (96% - 100%)    Physical Examination:  Constitutional: AAOx3, non-focal; NAD, comfortable resting   Focused LE exam:  Rt foot surgical dressing is c/d/i and undisturbed, with no strikethrough. Kept the dressing intact at this time.     LABS:               10.4   7.79  )-----------( 510      ( 28 Apr 2022 08:52 )             33.5     04-28  138  |  108  |  15  ----------------------------<  112<H>  4.4   |  26  |  0.66    Ca    9.6      28 Apr 2022 08:52      TPro  8.7<H>  /  Alb  3.1<L>  /  TBili  0.2  /  DBili  x   /  AST  14<L>  /  ALT  23  /  AlkPhos  112  04-25      Culture - Tissue with Gram Stain (04.25.22 @ 14:35)   Gram Stain:   No polymorphonuclear cells seen per low power field   Few Gram Variable Rods per oil power field   Specimen Source: .Tissue right second toe   Culture Results:   Moderate Staphylococcus aureus   Moderate Corynebacterium species "Susceptibilities not performed"      RADIOLOGY:  < from: US Duplex Venous Lower Ext Ltd, Right (04.25.22 @ 18:48) >  IMPRESSION:  No evidence of right lower extremity deep venous thrombosis.  < end of copied text >    < from: MR Foot w/wo IV Cont, Right (04.26.22 @ 14:41) >  IMPRESSION:  1.  Diffuse osteomyelitis of the second digit as follows. Diffuse bony   destruction of the middle and distal phalanges and confluent   osteomyelitis of the proximal phalanx, worse at the distal half.   Associated 2.9 cm soft tissue abscess at the second digit. Normal signal   in the second metatarsal head.  2.  Chronic post infectious/post surgical changes at the first digit as   above. No acute osteomyelitis in that location.  < end of copied text >    < from: Xray Foot AP + Lateral + Oblique, Right (04.28.22 @ 10:57) >   Two views of the right foot show recent amputation of the second toe at   the level of the midshaft of the second proximal phalanx. Right great toe   fragmentation is unchanged. The remaining joint spaces are maintained.  IMPRESSION: Postoperative changes.  < end of copied text >  
Date of service: 22  HPI: Pt is a pleasant 60 y/o Female with PM hx DM, HTN, MRSA, well known to Podiatry, with h/o  R great toe amputation who presented to [place] ED with right second toe pain. She reports pain increased over last couple of weeks and has had some discharge. She sees Dr. Bermudez at [facility] for regular care and pt currently on Bactrim for right toe wound. Pt reports no fever. no recent trauma. non smoker.    : Pt seen for follow up by podiatry with attending present of right 2nd toe infection. Pt is pleasant mood, no events. Denies any new pedal complaints when seen this am.  Denies any F/N/V/C/SOB. To OR tomorrow for partial amputation.      REVIEW OF SYSTEMS:  CONSTITUTIONAL: No fever, chills,  weight loss, or fatigue  EYES: No eye pain, visual disturbances, or discharge  ENMT:  No difficulty hearing, tinnitus, vertigo; No sinus or throat pain  NECK: No pain or stiffness  RESPIRATORY: No cough, wheezing, or hemoptysis; No shortness of breath  CARDIOVASCULAR: No chest pain, palpitations, dizziness, or leg swelling  GASTROINTESTINAL: No abdominal or epigastric pain. No nausea, vomiting, or hematemesis; No diarrhea or constipation. No melena or hematochezia.  GENITOURINARY: No dysuria, frequency, hematuria, or incontinence  NEUROLOGICAL: No headaches, memory loss, loss of strength, numbness, or tremors  SKIN: Rt 2nd toe wound   LYMPH NODES: No enlarged glands  ENDOCRINE: No heat or cold intolerance; No hair loss  MUSCULOSKELETAL: No joint pain or swelling; No muscle, back, or extremity pain  HEME/LYMPH: No easy bruising, or bleeding gums    PAST MEDICAL & SURGICAL HISTORY:  DM (diabetes mellitus)    HTN (hypertension)    HLD (hyperlipidemia)    Toe amputation status, left    H/O ankle fusion        Allergies:  sulfa drugs (Hives; Pruritus)  sulfADIAZINE (Hives)    meMEDICATIONS  (STANDING):  artificial  tears Solution 1 Drop(s) Both EYES four times a day  aspirin enteric coated 81 milliGRAM(s) Oral daily  atorvastatin 40 milliGRAM(s) Oral at bedtime  dextrose 5%. 1000 milliLiter(s) (50 mL/Hr) IV Continuous <Continuous>  dextrose 5%. 1000 milliLiter(s) (100 mL/Hr) IV Continuous <Continuous>  dextrose 50% Injectable 25 Gram(s) IV Push once  dextrose 50% Injectable 12.5 Gram(s) IV Push once  dextrose 50% Injectable 25 Gram(s) IV Push once  doxycycline IVPB 100 milliGRAM(s) IV Intermittent every 12 hours  glucagon  Injectable 1 milliGRAM(s) IntraMuscular once  insulin glargine Injectable (LANTUS) 20 Unit(s) SubCutaneous at bedtime  insulin lispro (ADMELOG) corrective regimen sliding scale   SubCutaneous three times a day before meals  insulin lispro (ADMELOG) corrective regimen sliding scale   SubCutaneous at bedtime  lidocaine   4% Patch 1 Patch Transdermal daily  lisinopril 10 milliGRAM(s) Oral daily  loratadine 10 milliGRAM(s) Oral daily  multivitamin 1 Tablet(s) Oral daily    MEDICATIONS  (PRN):  acetaminophen     Tablet .. 650 milliGRAM(s) Oral every 6 hours PRN Temp greater or equal to 38.5C (101.3F), Mild Pain (1 - 3)  dextrose Oral Gel 15 Gram(s) Oral once PRN Blood Glucose LESS THAN 70 milliGRAM(s)/deciliter  fluticasone propionate 50 MICROgram(s)/spray Nasal Spray 2 Spray(s) Both Nostrils two times a day PRN congestion  gabapentin 300 milliGRAM(s) Oral two times a day PRN pain      FAMILY HISTORY:  FH: ovarian cancer (Sibling)  FHx: diabetes mellitus (Father)    VITALS:  Vital Signs Last 24 Hrs  T(C): 36.2 (2022 07:49), Max: 36.7 (2022 15:36)  T(F): 97.1 (2022 07:49), Max: 98.1 (2022 15:36)  HR: 84 (2022 07:49) (84 - 88)  BP: 118/76 (2022 07:49) (97/70 - 143/77)  BP(mean): --  RR: 18 (2022 07:49) (18 - 18)  SpO2: 97% (2022 07:49) (96% - 99%)        Physical Examination:  Constitutional: AAOx3, non-focal; NAD, comfortable resting   Focused LE exam:  Vascular: Temperature gradient is warm to cool b/l, palpable pulses, DP/PT is 2/4 b/l, capillary re-fill time is brisk and instantaneous to digits 2-5 on right -edema.   Neuro: Intact protective sensation to the foot and digits 1-5 b/l.  Derm:  Skin is warm, supple, right second digit fibronecrotic wound, with mild purulent driange, + probe to bone, minimal periwound erythema  Musculoskeletal: Manual muscle testing is 5/5 in all muscle groups of the foot/ankle b/l. ROM to all the foot/ankle joints is WNL bilaterally. TTP over right 2nd digit    LABS:                                     10.0   5.37  )-----------( 450      ( 2022 09:14 )             32.4       136  |  107  |  14  ----------------------------<  216<H>  4.8   |  26  |  0.77    Ca    9.4      2022 09:14    TPro  8.7<H>  /  Alb  3.1<L>  /  TBili  0.2  /  DBili  x   /  AST  14<L>  /  ALT  23  /  AlkPhos  112          Urinalysis Basic - ( 2022 21:28 )    Color: Yellow / Appearance: Clear / S.005 / pH: x  Gluc: x / Ketone: Negative  / Bili: Negative / Urobili: Negative   Blood: x / Protein: Negative / Nitrite: Negative   Leuk Esterase: Negative / RBC: x / WBC x   Sq Epi: x / Non Sq Epi: x / Bacteria: x    Culture - Tissue with Gram Stain (22 @ 14:35)   Gram Stain:   No polymorphonuclear cells seen per low power field   Few Gram Variable Rods per oil power field   Specimen Source: .Tissue right second toe   Culture Results:   Moderate Staphylococcus aureus   Moderate Corynebacterium species "Susceptibilities not performed"        RADIOLOGY:  < from: US Duplex Venous Lower Ext Ltd, Right (22 @ 18:48) >  IMPRESSION:  No evidence of right lower extremity deep venous thrombosis.    < end of copied text >    < from: MR Foot w/wo IV Cont, Right (22 @ 14:41) >  IMPRESSION:  1.  Diffuse osteomyelitis of the second digit as follows. Diffuse bony   destruction of the middle and distal phalanges and confluent   osteomyelitis of the proximal phalanx, worse at the distal half.   Associated 2.9 cm soft tissue abscess at the second digit. Normal signal   in the second metatarsal head.    2.  Chronic post infectious/post surgical changes at the first digit as   above. No acute osteomyelitis in that location.    < end of copied text >      
Date of service: 4/30/22  HPI: Pt is a pleasant 62 y/o Female with PM hx DM, HTN, MRSA, well known to Podiatry, with h/o  R great toe amputation who presented to [place] ED with right second toe pain. She reports pain increased over last couple of weeks and has had some discharge. She sees Dr. Bermudez at [facility] for regular care and pt currently on Bactrim for right toe wound. Pt reports no fever. no recent trauma. non smoker.    4/30: Pt seen at bedside for follow up by podiatry team with attending present. Pt is POD 2 s/p Rt 2nd toe partial amputation, performed on 4/28/22. Pt is in stable condition, and in NAD. Pt states that her yesterday's nausea is improving. Pt denies having pain at the surgical site at this time. Pt denies any acute overnight events and denies F/N/V/SOB.  Of note: Pt expressed her concerns about when the Cx results will be out, as she wishes to be discharged home asap.     REVIEW OF SYSTEMS:  All other ROS is negative apart of what was mentioned in the HPI section    PAST MEDICAL & SURGICAL HISTORY:  DM (diabetes mellitus)  HTN (hypertension)  HLD (hyperlipidemia)  Toe amputation status, left  H/O ankle fusion    Allergies:  sulfa drugs (Hives; Pruritus)  sulfADIAZINE (Hives)    MEDICATIONS  (STANDING):  artificial  tears Solution 1 Drop(s) Both EYES four times a day  aspirin enteric coated 81 milliGRAM(s) Oral daily  atorvastatin 40 milliGRAM(s) Oral at bedtime  dextrose 5%. 1000 milliLiter(s) (100 mL/Hr) IV Continuous <Continuous>  dextrose 5%. 1000 milliLiter(s) (50 mL/Hr) IV Continuous <Continuous>  enoxaparin Injectable 40 milliGRAM(s) SubCutaneous every 24 hours  glucagon  Injectable 1 milliGRAM(s) IntraMuscular once  insulin glargine Injectable (LANTUS) 20 Unit(s) SubCutaneous at bedtime  insulin lispro (ADMELOG) corrective regimen sliding scale   SubCutaneous at bedtime  insulin lispro (ADMELOG) corrective regimen sliding scale   SubCutaneous three times a day before meals  lidocaine   4% Patch 1 Patch Transdermal daily  lisinopril 10 milliGRAM(s) Oral daily  loratadine 10 milliGRAM(s) Oral daily  multivitamin 1 Tablet(s) Oral daily  sodium chloride 0.9%. 500 milliLiter(s) (80 mL/Hr) IV Continuous <Continuous>  vancomycin  IVPB 1250 milliGRAM(s) IV Intermittent every 12 hours    MEDICATIONS  (PRN):  acetaminophen     Tablet .. 650 milliGRAM(s) Oral every 6 hours PRN Temp greater or equal to 38.5C (101.3F), Mild Pain (1 - 3)  aluminum hydroxide/magnesium hydroxide/simethicone Suspension 30 milliLiter(s) Oral every 4 hours PRN Dyspepsia  fluticasone propionate 50 MICROgram(s)/spray Nasal Spray 2 Spray(s) Both Nostrils two times a day PRN congestion  gabapentin 300 milliGRAM(s) Oral two times a day PRN pain    FAMILY HISTORY:  FH: ovarian cancer (Sibling)  FHx: diabetes mellitus (Father)    VITALS:  Vital Signs Last 24 Hrs  T(C): 36.5 (29 Apr 2022 23:24), Max: 36.5 (29 Apr 2022 23:24)  T(F): 97.7 (29 Apr 2022 23:24), Max: 97.7 (29 Apr 2022 23:24)  HR: 95 (29 Apr 2022 23:24) (93 - 95)  BP: 132/72 (29 Apr 2022 23:24) (125/87 - 132/72)-  RR: 18 (29 Apr 2022 23:24) (18 - 18)  SpO2: 95% (29 Apr 2022 23:24) (95% - 95%)    Physical Examination:  Constitutional: AAOx3, non-focal; NAD, comfortable resting   Focused LE exam:  Rt foot surgical dressing is c/d/i and undisturbed, with very minimal strikethrough. Upon dressing removal, minimal serosanguinous drainage was noted at the surgical site, packing was removed, sutures are in place, surgical site is well coapted, w/o any gaping or dehiscence.      LABS:                10.0   8.07  )-----------( 502      ( 29 Apr 2022 09:54 )             32.4     04-29  136  |  105  |  16  ----------------------------<  252<H>  4.9   |  27  |  0.70    Ca    9.2      29 Apr 2022 09:54    TPro  8.7<H>  /  Alb  3.1<L>  /  TBili  0.2  /  DBili  x   /  AST  14<L>  /  ALT  23  /  AlkPhos  112  04-25      Culture - Tissue with Gram Stain (04.25.22 @ 14:35)   Gram Stain:   No polymorphonuclear cells seen per low power field   Few Gram Variable Rods per oil power field   Specimen Source: .Tissue right second toe   Culture Results:   Moderate Staphylococcus aureus   Moderate Corynebacterium species "Susceptibilities not performed"      RADIOLOGY:  < from: US Duplex Venous Lower Ext Ltd, Right (04.25.22 @ 18:48) >  IMPRESSION:  No evidence of right lower extremity deep venous thrombosis.  < end of copied text >    < from: MR Foot w/wo IV Cont, Right (04.26.22 @ 14:41) >  IMPRESSION:  1.  Diffuse osteomyelitis of the second digit as follows. Diffuse bony   destruction of the middle and distal phalanges and confluent   osteomyelitis of the proximal phalanx, worse at the distal half.   Associated 2.9 cm soft tissue abscess at the second digit. Normal signal   in the second metatarsal head.  2.  Chronic post infectious/post surgical changes at the first digit as   above. No acute osteomyelitis in that location.  < end of copied text >    < from: Xray Foot AP + Lateral + Oblique, Right (04.28.22 @ 10:57) >   Two views of the right foot show recent amputation of the second toe at   the level of the midshaft of the second proximal phalanx. Right great toe   fragmentation is unchanged. The remaining joint spaces are maintained.  IMPRESSION: Postoperative changes.  < end of copied text >

## 2022-04-30 NOTE — ADVANCED PRACTICE NURSE CONSULT - ASSESSMENT
Patient is awake and alert. PICC insertion along with risks, benefits, possible complications and infection prevention explained to pt who verbalized understanding. All questions addressed and written signed consent placed on chart. Time out along with hand washing by 2 RN's done. Right arm cleansed with CHG. Patient draped in usual fashion with sterile sheet. Lidocaine 1% 3ml given intradermally to right arm marked site. Using strict sterile technique, under ultrasound guidance, placed Navilyst Xcela 4F single lumen PICC with PASV technology, (lot# [id number]) cut to 38cm into right brachial vein. Brisk blood return and flushed with 40ml NS. Minimal blood loss and pt tolerated procedure well. All sharps accounted for. Dressing and end cap in place. Xray done and report given to district RN.

## 2022-04-30 NOTE — PROGRESS NOTE ADULT - ATTENDING COMMENTS
Discussed with the residents, agree with the assessment and plan, surgical path and surgical Cx results are pending. Will continue to follow.
Discussed with the residents, agree with the assessment and plan, surgical path and surgical Cx results are pending. Will continue to follow.
I agree with assessment and plan as stated above by the resident
I agree with assessment and plan as stated above by the resident

## 2022-04-30 NOTE — DISCHARGE NOTE PROVIDER - NSDCCPCAREPLAN_GEN_ALL_CORE_FT
PRINCIPAL DISCHARGE DIAGNOSIS  Diagnosis: Diabetic ulcer of right foot  Assessment and Plan of Treatment:

## 2022-04-30 NOTE — DISCHARGE NOTE PROVIDER - PROVIDER TOKENS
FREE:[LAST:[suad],FIRST:[rajwinder],PHONE:[(   )    -],FAX:[(   )    -],SCHEDULEDAPPT:[05/06/2022]],PROVIDER:[TOKEN:[[id number]],FOLLOWUP:[1 week]]

## 2022-04-30 NOTE — PROGRESS NOTE ADULT - REASON FOR ADMISSION
Right second toe Diabetic Ulcer  Right foot cellulitis  Hx of MRSA infection

## 2022-04-30 NOTE — PROGRESS NOTE ADULT - ASSESSMENT
62 y/o Female with PM hx DM, HTN on ASA 81mg, MRSA, Left second toe amputation, R great toe partial amputation who presented to [facility] ED with right second toe pain. She reports pain increased over last couple of weeks and has had some discharge.  Pt reports swelling of right lower leg. Pt has been on Bactrim.  She denies fever, no cpain/SOB,  no recent trauma, no n/v/d,  no abd pain,  no urinary complaints. She did have a fine hive type reaction with itching to the left upper forearm,  which is improved now. Was eval by podiatry, given doxycycline, plan for R 2nd toe partial amputation.    1. R 2nd toe ulcer/cellulitis/OM  - MRI R foot with OM  - plan for partial amputation 4/28  - podiatry follow up noted  - wound cx growing stau, corynebacterium, blood cx no growth   - on doxycycline 100mg BID; change to vancomycin [id number] check trough prior to 4th dose   - continue with antibiotic coverage  - fu cbc  - tolerating abx well so far; no side effects noted.  - reason for abx use and side effects reviewed with patient  - supportive care    2. other issues - care per medicine 
Assessment and Plan:        60 y/o Female with PM hx DM, HTN on ASA 81mg, MRSA, Left second toe amputation, R great toe partial amputation who presented to [facility] ED with right second toe pain. She reports pain increased over last couple of weeks and has had some discharge.  Pt reports swelling of right lower leg.       Right second toe Diabetic Ulcer  DDX Osteomyelitis and abcess of right second toe  - PICC and 6 weeks IV vancomycin [id number] until 6/7/22 with weekly cbc, cmp, esr, crp, vanco trough  - Pt is deemed stable from the podiatric standpoint and ready to be discharged.  - Pt was instructed not to have surgical dressing changes at home until her follow up appointment w/ Dr. Bermudez at the  wound care center on Friday 5/6/22.  - Pt is to WBAT to the Rt heel in the provided surgical shoe.  - DC home today    
Assessment and Plan:        60 y/o Female with PM hx DM, HTN on ASA 81mg, MRSA, Left second toe amputation, R great toe partial amputation who presented to [facility] ED with right second toe pain. She reports pain increased over last couple of weeks and has had some discharge.  Pt reports swelling of right lower leg.     Right second toe Diabetic Ulcer  DDX Osteomyelitis and abcess of right second toe  Right foot cellulitis  Hx of MRSA infection  Thrombocytosis, ESR 91  DM II , last HbA1C  was 10   ( Jan or Feb 2022)  - s/p Bactrim BID at home (last dose 4/24/22) , will discont due to Sulfa allergy and add doxycycline  - ID consult >> c/w IV Doxycycline  -wound cultures growing gram variable species  -for OR thursday for partial vs complete amputation of the toe.   - blood cx  - RLE dupplex is neg for DVT  - hold oral DM meds  - pt on ASA 81mg  - cont Insulin sliding scale and Lantus 20 HS,  Osimpic 1mg weekly is non-formulary at [facility]  - pt is optimized for surgery and cleared with revised cardiac risk of 6% due to Insulin Dependent Diabetes.   Recommend nightly Lantus insulin to be reduced to 10 units the night before the procedure.   - physical therapy  - DVT proph: lovenox
Assessment and Plan:        62 y/o Female with PM hx DM, HTN on ASA 81mg, MRSA, Left second toe amputation, R great toe partial amputation who presented to [facility] ED with right second toe pain. She reports pain increased over last couple of weeks and has had some discharge.  Pt reports swelling of right lower leg.     Right second toe Diabetic Ulcer  DDX Osteomyelitis and abcess of right second toe  Right foot cellulitis  Hx of MRSA infection  Thrombocytosis, ESR 91  DM II , last HbA1C  was 10   ( Jan or Feb 2022)  - s/p Bactrim BID at home (last dose 4/24/22) , will discont due to Sulfa allergy and add doxycycline  - ID consult >> c/w IV Doxycycline  -wound cultures growing gram variable species  -for OR thursday for partial vs complete amputation of the toe.   - blood cx  - RLE dupplex is neg for DVT  - hold oral DM meds  - pt on ASA 81mg  - cont Insulin sliding scale and Lantus 20 HS,  Osimpic 1mg weekly is non-formulary at [facility]  - pt is optimized for surgery and cleared with revised cardiac risk of 6% due to Insulin Dependent Diabetes.   Recommend nightly Lantus insulin to be reduced to 10 units the night before the procedure.   - physical therapy  - DVT proph: lovenox
Assessment: 60 y/o female seen and examined for the following  - S/P Rt 2nd toe partial amputation, performed on 4/28/22 for the treatment of right 2nd digit osteomyelitis   - Pain to right foot   - Difficulty with ambulation    Plan:  - Pt evaluated and chart reviewed.  - Reviewed the imaging and available labs  - - MRI of right foot - findings noted above   - Surgical wound culture, and pathology are pending.  - Discussed the potential causes and plan of management with the Pt.  - Discussed all the different aspects of treatment with the Pt including conservative and surgical options.  - Pt agreed to the treatment plan, and opted for the surgical intervention.  - Performed Rt 2nd toe partial amputation on 4/28/22, Pt tolerated the procedure well w/o any complications. For details, please check the post-operative notes.   - Kept surgical dressing c/d/i at this time.  - Pt is to WBAT to the Rt heel in the provided surgical shoe.  - ID recs appreciated, Pt will need PICC line, and 6 weeks of IV antibiotics per ID recommendations.  - Medical clearance obtained 4/26. Appreciated  - All additional care per medicine, appreciated  - Podiatry will follow in house, and perform surgical dressing changes.       
Assessment: 60 y/o female seen and examined for the following  - S/P Rt 2nd toe partial amputation, performed on 4/28/22 for the treatment of right 2nd digit osteomyelitis   - Pain to right foot   - Difficulty with ambulation    Plan:  - Pt evaluated and chart reviewed.  - Reviewed the imaging and available labs  - MRI of right foot - findings noted above   - Surgical wound culture, and pathology are pending, with prelim results showing S. aureus.  - Discussed the potential causes and plan of management with the Pt.  - Discussed all the different aspects of treatment with the Pt including conservative and surgical options.  - Pt agreed to the treatment plan, and opted for the surgical intervention.  - Medical clearance obtained 4/26. Appreciated  - Performed Rt 2nd toe partial amputation on 4/28/22, Pt tolerated the procedure well w/o any complications. For details, please check the post-operative notes.   - Changed surgical dressing, and applied xeroform, and DSD w/o packing.   - Keep surgical dressing c/d/i at all times.  - Pt is deemed stable from the podiatric standpoint and ready to be discharged, once deemed stable by all other medical specialities.   - Pt was instructed not to have surgical dressing changes at home until her follow up appointment w/ Dr. Bermudez at the  wound care center on Friday 5/6/22.  - Pt is to WBAT to the Rt heel in the provided surgical shoe.  - ID recs appreciated, Pt will need PICC line, and 6 weeks of IV antibiotics per ID recommendations.  - All additional care per medicine, appreciated.  - Podiatry will follow in house, and perform surgical dressing changes.      
Assessment: 62 y/o female seen and examined for the following  - Right 2nd digit full thickness ulceration  - Om of Right 2nd toe  - Pain to right foot   - Difficulty with ambulation      Plan:  - Pt evaluated and chart reviewed.  - Reviewed the imaging and available albs  - Discussed the potential causes and plan of management with the Pt.  - Discussed all the different aspects of treatment with the Pt including conservative and surgical options.  - Pt agreed to  treatment plan  - Betadine and DSD applied to right 2nd digit   - x-rays reviewed no evidence of ST emphysema on wet read- awaiting final impression   - MRI of right foot - Ordered   - Recommended ID consult.  - Pt scheduled for OR on Thursday 4/28/22  - Please have pt optimized and clearance stated in chart, appreciated   - All additional care per medicine, appreciated  - Podiatry will follow       
60 y/o Female with PM hx DM, HTN on ASA 81mg, MRSA, Left second toe amputation, R great toe partial amputation who presented to [facility] ED with right second toe pain. She reports pain increased over last couple of weeks and has had some discharge.  Pt reports swelling of right lower leg. Pt has been on Bactrim.  She denies fever, no cpain/SOB,  no recent trauma, no n/v/d,  no abd pain,  no urinary complaints. She did have a fine hive type reaction with itching to the left upper forearm,  which is improved now. Was eval by podiatry, given doxycycline, plan for R 2nd toe partial amputation.    1. R 2nd toe ulcer/cellulitis/OM  - MRI R foot with OM  - plan for partial amputation today f/u wound cx  - podiatry follow up noted  - wound cx growing mssa/corynebacterium, hx prior MRSA in prior wound cx, blood cx no growth   - s/p doxycycline 100mg BID  - on vancomycin [id number] check trough prior to 4th dose #2  - continue with antibiotic coverage  - fu cbc  - tolerating abx well so far; no side effects noted.  - reason for abx use and side effects reviewed with patient  - supportive care    2. other issues - care per medicine 
60 y/o Female with PM hx DM, HTN on ASA 81mg, MRSA, Left second toe amputation, R great toe partial amputation who presented to [facility] ED with right second toe pain. She reports pain increased over last couple of weeks and has had some discharge.  Pt reports swelling of right lower leg. Pt has been on Bactrim.  She denies fever, no cpain/SOB,  no recent trauma, no n/v/d,  no abd pain,  no urinary complaints. She did have a fine hive type reaction with itching to the left upper forearm,  which is improved now. Was eval by podiatry, given doxycycline, plan for R 2nd toe partial amputation.    1. R 2nd toe ulcer/cellulitis/OM  - MRI R foot with OM  - s/p partial amputation, OR cultures growing STAU f/u sensitivities   - podiatry follow up noted  - wound cx growing mssa/corynebacterium, hx prior MRSA in prior wound cx, blood cx no growth   - s/p doxycycline 100mg BID  - on vancomycin [id number] trough therapeutic #4  - continue with antibiotic coverage  - will require picc line and 6 weeks IV abx - waiting on sensitivities of cx but plan for vancomycin [id number] 6 weeks until 6/7/22 with weekly cbc, cmp, esr, crp, vanco trough  - fu cbc  - tolerating abx well so far; no side effects noted.  - reason for abx use and side effects reviewed with patient  - supportive care    2. other issues - care per medicine 
62 y/o Female with PM hx DM, HTN on ASA 81mg, MRSA, Left second toe amputation, R great toe partial amputation who presented to [facility] ED with right second toe pain. She reports pain increased over last couple of weeks and has had some discharge.  Pt reports swelling of right lower leg. Pt has been on Bactrim.  She denies fever, no cpain/SOB,  no recent trauma, no n/v/d,  no abd pain,  no urinary complaints. She did have a fine hive type reaction with itching to the left upper forearm,  which is improved now. Was eval by podiatry, given doxycycline, plan for R 2nd toe partial amputation.    1. R 2nd toe ulcer/cellulitis/OM  - MRI R foot with OM  - s/p partial amputation, OR cultures growing STAU f/u sensitivities   - podiatry follow up noted  - wound cx growing mssa/corynebacterium, hx prior MRSA in prior wound cx, blood cx no growth   - s/p doxycycline 100mg BID  - on vancomycin [id number] check trough prior to 4th dose #3  - continue with antibiotic coverage  - will require picc line and 6 weeks IV abx - waiting on sensitivities of cx but likely will be vancomycin [id number] 6 weeks until 6/7/22 with weekly cbc, cmp, esr, crp, vanco trough  - fu cbc  - tolerating abx well so far; no side effects noted.  - reason for abx use and side effects reviewed with patient  - supportive care    2. other issues - care per medicine 
Assessment: 62 y/o female seen and examined for the following  - Right 2nd digit full thickness ulceration  - Om of Right 2nd toe  - Pain to right foot   - Difficulty with ambulation      Plan:  - Pt evaluated and chart reviewed.  - Reviewed the imaging and available albs  - Discussed the potential causes and plan of management with the Pt.  - Discussed all the different aspects of treatment with the Pt including conservative and surgical options.  - Pt agreed to  treatment plan  - Betadine and DSD applied to right 2nd digit   - x-rays reviewed no evidence of ST emphysema on wet read   - MRI of right foot - findings noted above   - Prelim deep wound culture growing moderate staph  - ID recs appreciated   - Pt for OR on Thursday 4/28/22 with podiatry   - NPO after midnight tonight (4/27)  - Medical clearance in pt chart since 4/26. Appreciated  - All additional care per medicine, appreciated  - Podiatry will follow       
Assessment and Plan:        60 y/o Female with PM hx DM, HTN on ASA 81mg, MRSA, Left second toe amputation, R great toe partial amputation who presented to [place] ED with right second toe pain. She reports pain increased over last couple of weeks and has had some discharge.  Pt reports swelling of right lower leg.       - on vancomycin [id number] check trough prior to 4th dose #3  - continue with antibiotic coverage  - will require picc line and 6 weeks IV abx - waiting on sensitivities of cx but likely will be vancomycin [id number] 6 weeks until 6/7/22 with weekly cbc, cmp, esr, crp, vanco trough        Right second toe Diabetic Ulcer  DDX Osteomyelitis and abcess of right second toe  Right foot cellulitis  Hx of MRSA infection  Thrombocytosis, ESR 91  DM II , last HbA1C  was 10   ( Jan or Feb 2022)  - s/p Bactrim BID at home (last dose 4/24/22) , will discont due to Sulfa allergy and add doxycycline  - ID consult >> c/w IV Doxycycline  -wound cultures growing gram variable species  -for OR thursday for partial vs complete amputation of the toe.   - blood cx  - RLE dupplex is neg for DVT  - hold oral DM meds  - pt on ASA 81mg  - cont Insulin sliding scale and Lantus 20 HS,  Osimpic 1mg weekly is non-formulary at [facility]  - pt is optimized for surgery and cleared with revised cardiac risk of 6% due to Insulin Dependent Diabetes.   Recommend nightly Lantus insulin to be reduced to 10 units the night before the procedure.   - physical therapy  - DVT proph: lovenox

## 2022-04-30 NOTE — PROGRESS NOTE ADULT - PROVIDER SPECIALTY LIST ADULT
Hospitalist
Infectious Disease
Hospitalist
Podiatry
Hospitalist
Hospitalist
Infectious Disease
Hospitalist
Infectious Disease
Infectious Disease
Podiatry

## 2022-05-06 ENCOUNTER — OUTPATIENT (OUTPATIENT)
Dept: OUTPATIENT SERVICES | Facility: HOSPITAL | Age: 62
LOS: 1 days | End: 2022-05-06
Payer: MEDICARE

## 2022-05-06 DIAGNOSIS — Z98.1 ARTHRODESIS STATUS: Chronic | ICD-10-CM

## 2022-05-06 DIAGNOSIS — E11.69 TYPE 2 DIABETES MELLITUS WITH OTHER SPECIFIED COMPLICATION: ICD-10-CM

## 2022-05-06 DIAGNOSIS — E11.621 TYPE 2 DIABETES MELLITUS WITH FOOT ULCER: ICD-10-CM

## 2022-05-06 DIAGNOSIS — L97.512 NON-PRESSURE CHRONIC ULCER OF OTHER PART OF RIGHT FOOT WITH FAT LAYER EXPOSED: ICD-10-CM

## 2022-05-06 DIAGNOSIS — L97.522 NON-PRESSURE CHRONIC ULCER OF OTHER PART OF LEFT FOOT WITH FAT LAYER EXPOSED: ICD-10-CM

## 2022-05-06 DIAGNOSIS — M86.071 ACUTE HEMATOGENOUS OSTEOMYELITIS, RIGHT ANKLE AND FOOT: ICD-10-CM

## 2022-05-06 DIAGNOSIS — L03.115 CELLULITIS OF RIGHT LOWER LIMB: ICD-10-CM

## 2022-05-06 DIAGNOSIS — M86.171 OTHER ACUTE OSTEOMYELITIS, RIGHT ANKLE AND FOOT: ICD-10-CM

## 2022-05-06 DIAGNOSIS — E11.49 TYPE 2 DIABETES MELLITUS WITH OTHER DIABETIC NEUROLOGICAL COMPLICATION: ICD-10-CM

## 2022-05-06 DIAGNOSIS — Z89.422 ACQUIRED ABSENCE OF OTHER LEFT TOE(S): Chronic | ICD-10-CM

## 2022-05-06 DIAGNOSIS — Z79.4 LONG TERM (CURRENT) USE OF INSULIN: ICD-10-CM

## 2022-05-06 DIAGNOSIS — L02.611 CUTANEOUS ABSCESS OF RIGHT FOOT: ICD-10-CM

## 2022-05-06 DIAGNOSIS — B95.61 METHICILLIN SUSCEPTIBLE STAPHYLOCOCCUS AUREUS INFECTION AS THE CAUSE OF DISEASES CLASSIFIED ELSEWHERE: ICD-10-CM

## 2022-05-06 DIAGNOSIS — D75.839 THROMBOCYTOSIS, UNSPECIFIED: ICD-10-CM

## 2022-05-06 DIAGNOSIS — I10 ESSENTIAL (PRIMARY) HYPERTENSION: ICD-10-CM

## 2022-05-06 DIAGNOSIS — A49.02 METHICILLIN RESISTANT STAPHYLOCOCCUS AUREUS INFECTION, UNSPECIFIED SITE: ICD-10-CM

## 2022-05-06 DIAGNOSIS — L97.519 NON-PRESSURE CHRONIC ULCER OF OTHER PART OF RIGHT FOOT WITH UNSPECIFIED SEVERITY: ICD-10-CM

## 2022-05-06 DIAGNOSIS — Z88.2 ALLERGY STATUS TO SULFONAMIDES: ICD-10-CM

## 2022-05-06 PROCEDURE — G0463: CPT

## 2022-05-13 ENCOUNTER — OUTPATIENT (OUTPATIENT)
Dept: OUTPATIENT SERVICES | Facility: HOSPITAL | Age: 62
LOS: 1 days | End: 2022-05-13
Payer: MEDICARE

## 2022-05-13 DIAGNOSIS — L97.512 NON-PRESSURE CHRONIC ULCER OF OTHER PART OF RIGHT FOOT WITH FAT LAYER EXPOSED: ICD-10-CM

## 2022-05-13 DIAGNOSIS — L97.522 NON-PRESSURE CHRONIC ULCER OF OTHER PART OF LEFT FOOT WITH FAT LAYER EXPOSED: ICD-10-CM

## 2022-05-13 DIAGNOSIS — Z89.422 ACQUIRED ABSENCE OF OTHER LEFT TOE(S): Chronic | ICD-10-CM

## 2022-05-13 DIAGNOSIS — Z98.1 ARTHRODESIS STATUS: Chronic | ICD-10-CM

## 2022-05-13 DIAGNOSIS — E11.49 TYPE 2 DIABETES MELLITUS WITH OTHER DIABETIC NEUROLOGICAL COMPLICATION: ICD-10-CM

## 2022-05-13 DIAGNOSIS — M86.071 ACUTE HEMATOGENOUS OSTEOMYELITIS, RIGHT ANKLE AND FOOT: ICD-10-CM

## 2022-05-13 DIAGNOSIS — A49.02 METHICILLIN RESISTANT STAPHYLOCOCCUS AUREUS INFECTION, UNSPECIFIED SITE: ICD-10-CM

## 2022-05-13 PROCEDURE — G0463: CPT

## 2022-05-24 DIAGNOSIS — L97.522 NON-PRESSURE CHRONIC ULCER OF OTHER PART OF LEFT FOOT WITH FAT LAYER EXPOSED: ICD-10-CM

## 2022-05-27 ENCOUNTER — OUTPATIENT (OUTPATIENT)
Dept: OUTPATIENT SERVICES | Facility: HOSPITAL | Age: 62
LOS: 1 days | End: 2022-05-27
Payer: MEDICARE

## 2022-05-27 DIAGNOSIS — L97.522 NON-PRESSURE CHRONIC ULCER OF OTHER PART OF LEFT FOOT WITH FAT LAYER EXPOSED: ICD-10-CM

## 2022-05-27 DIAGNOSIS — M86.071 ACUTE HEMATOGENOUS OSTEOMYELITIS, RIGHT ANKLE AND FOOT: ICD-10-CM

## 2022-05-27 DIAGNOSIS — L97.512 NON-PRESSURE CHRONIC ULCER OF OTHER PART OF RIGHT FOOT WITH FAT LAYER EXPOSED: ICD-10-CM

## 2022-05-27 DIAGNOSIS — E11.49 TYPE 2 DIABETES MELLITUS WITH OTHER DIABETIC NEUROLOGICAL COMPLICATION: ICD-10-CM

## 2022-05-27 DIAGNOSIS — Z98.1 ARTHRODESIS STATUS: Chronic | ICD-10-CM

## 2022-05-27 DIAGNOSIS — A49.02 METHICILLIN RESISTANT STAPHYLOCOCCUS AUREUS INFECTION, UNSPECIFIED SITE: ICD-10-CM

## 2022-05-27 DIAGNOSIS — Z89.422 ACQUIRED ABSENCE OF OTHER LEFT TOE(S): Chronic | ICD-10-CM

## 2022-05-27 PROCEDURE — G0463: CPT

## 2022-06-13 ENCOUNTER — OUTPATIENT (OUTPATIENT)
Dept: OUTPATIENT SERVICES | Facility: HOSPITAL | Age: 62
LOS: 1 days | End: 2022-06-13
Payer: MEDICARE

## 2022-06-13 DIAGNOSIS — Z98.1 ARTHRODESIS STATUS: Chronic | ICD-10-CM

## 2022-06-13 DIAGNOSIS — L97.522 NON-PRESSURE CHRONIC ULCER OF OTHER PART OF LEFT FOOT WITH FAT LAYER EXPOSED: ICD-10-CM

## 2022-06-13 DIAGNOSIS — M86.071 ACUTE HEMATOGENOUS OSTEOMYELITIS, RIGHT ANKLE AND FOOT: ICD-10-CM

## 2022-06-13 DIAGNOSIS — A49.02 METHICILLIN RESISTANT STAPHYLOCOCCUS AUREUS INFECTION, UNSPECIFIED SITE: ICD-10-CM

## 2022-06-13 DIAGNOSIS — E11.49 TYPE 2 DIABETES MELLITUS WITH OTHER DIABETIC NEUROLOGICAL COMPLICATION: ICD-10-CM

## 2022-06-13 DIAGNOSIS — L97.512 NON-PRESSURE CHRONIC ULCER OF OTHER PART OF RIGHT FOOT WITH FAT LAYER EXPOSED: ICD-10-CM

## 2022-06-13 DIAGNOSIS — Z89.422 ACQUIRED ABSENCE OF OTHER LEFT TOE(S): Chronic | ICD-10-CM

## 2022-06-13 PROCEDURE — G0463: CPT

## 2022-09-27 NOTE — ED PROVIDER NOTE - PMH
Admission Reconciliation is Not Complete  Discharge Reconciliation is Not Complete DM (diabetes mellitus)    HLD (hyperlipidemia)    HTN (hypertension)     Admission Reconciliation is Completed  Discharge Reconciliation is Completed

## 2022-11-02 NOTE — DISCHARGE NOTE ADULT - PATIENT PORTAL LINK FT
"Chief Complaint  Establish Care, Neck Pain, and Shoulder Pain    Subjective        Miguelina Cruz presents to [facility]  History of Present Illness  Patient presents office today to establish care.  She was a previous patient of Dr. Reid.  Patient is here today to discuss neck pain and shoulder pain.  Blood pressure is elevated 154/94.      Objective   Vital Signs:  /94 (BP Location: Left arm, Patient Position: Sitting, Cuff Size: Adult)   Pulse 69   Temp 97.7 °F (36.5 °C)   Ht 165.1 cm (65\")   Wt 63.1 kg (139 lb 3.2 oz)   SpO2 98%   BMI 23.16 kg/m²   Estimated body mass index is 23.16 kg/m² as calculated from the following:    Height as of this encounter: 165.1 cm (65\").    Weight as of this encounter: 63.1 kg (139 lb 3.2 oz).    BMI is within normal parameters. No other follow-up for BMI required.      Physical Exam  Constitutional:       General: She is not in acute distress.     Appearance: Normal appearance.   Eyes:      Pupils: Pupils are equal, round, and reactive to light.   Cardiovascular:      Rate and Rhythm: Normal rate and regular rhythm.      Pulses: Normal pulses.      Heart sounds: Normal heart sounds.   Pulmonary:      Effort: Pulmonary effort is normal.      Breath sounds: Normal breath sounds. No wheezing or rales.   Musculoskeletal:         General: Tenderness present. No swelling.      Right shoulder: Tenderness present. Decreased range of motion.      Left shoulder: Tenderness present. Decreased range of motion.      Cervical back: Pain with movement present. Decreased range of motion.   Neurological:      Mental Status: She is alert.   Psychiatric:         Mood and Affect: Mood normal.         Behavior: Behavior normal.        Result Review :                Assessment and Plan   Diagnoses and all orders for this visit:    1. Chronic obstructive pulmonary disease, unspecified COPD type (HCC) (Primary)  -     XR Chest PA & Lateral; Future  -     " albuterol sulfate  (90 Base) MCG/ACT inhaler; Inhale 2 puffs Every 4 (Four) Hours As Needed for Wheezing.  Dispense: 18 g; Refill: 5    2. Myalgia  -     XR Spine Cervical Complete 4 or 5 View; Future    3. Neck pain  -     XR Spine Cervical Complete 4 or 5 View; Future    No falls, weakness, or new numbness or tingling. No incontinence.  Radiology read final report.  Advised patient to take Motrin as needed for pain.         I spent 30 minutes caring for Miguelina on this date of service. This time includes time spent by me in the following activities:preparing for the visit, reviewing tests, obtaining and/or reviewing a separately obtained history, performing a medically appropriate examination and/or evaluation , counseling and educating the patient/family/caregiver, ordering medications, tests, or procedures, referring and communicating with other health care professionals , documenting information in the medical record, independently interpreting results and communicating that information with the patient/family/caregiver and care coordination  Follow Up   Return in about 1 week (around 11/9/2022) for Annual physical.  Patient was given instructions and counseling regarding her condition or for health maintenance advice. Please see specific information pulled into the AVS if appropriate.        You can access the [facility] Patient Portal, offered by [facility], by registering with the following website: [email or web address]

## 2023-07-10 NOTE — DIETITIAN INITIAL EVALUATION ADULT - NSFNSPHYEXAMSKINFT_GEN_A_CORE
Pressure Injury 1: Right:,3rd toe, none  Pressure Injury 2: none, none  Pressure Injury 3: none, none  Pressure Injury 4: none, none  Pressure Injury 5: none, none  Pressure Injury 6: none, none  Pressure Injury 7: none, none  Pressure Injury 8: none, none  Pressure Injury 9: none, none  Pressure Injury 10: none, none  Pressure Injury 11: none, none, Pressure Injury 1: Right:,third toe, Suspected deep tissue injury  Pressure Injury 2: none, none  Pressure Injury 3: none, none  Pressure Injury 4: none, none  Pressure Injury 5: none, none  Pressure Injury 6: none, none  Pressure Injury 7: none, none  Pressure Injury 8: none, none  Pressure Injury 9: none, none  Pressure Injury 10: none, none  Pressure Injury 11: none, none Trilobed Flap Text: The defect edges were debeveled with a #15 scalpel blade.  Given the location of the defect and the proximity to free margins a trilobed flap was deemed most appropriate.  Using a sterile surgical marker, an appropriate trilobed flap drawn around the defect.    The area thus outlined was incised deep to adipose tissue with a #15 scalpel blade.  The skin margins were undermined to an appropriate distance in all directions utilizing iris scissors.

## 2023-07-20 ENCOUNTER — EMERGENCY (EMERGENCY)
Facility: HOSPITAL | Age: 63
LOS: 0 days | Discharge: HOME | End: 2023-07-20
Attending: STUDENT IN AN ORGANIZED HEALTH CARE EDUCATION/TRAINING PROGRAM
Payer: MEDICARE

## 2023-07-20 VITALS
RESPIRATION RATE: 18 BRPM | HEIGHT: 65 IN | TEMPERATURE: 98 F | WEIGHT: 139.99 LBS | OXYGEN SATURATION: 99 % | DIASTOLIC BLOOD PRESSURE: 66 MMHG | HEART RATE: 82 BPM | SYSTOLIC BLOOD PRESSURE: 135 MMHG

## 2023-07-20 DIAGNOSIS — Z89.422 ACQUIRED ABSENCE OF OTHER LEFT TOE(S): Chronic | ICD-10-CM

## 2023-07-20 DIAGNOSIS — M17.11 UNILATERAL PRIMARY OSTEOARTHRITIS, RIGHT KNEE: ICD-10-CM

## 2023-07-20 DIAGNOSIS — M25.561 PAIN IN RIGHT KNEE: ICD-10-CM

## 2023-07-20 DIAGNOSIS — Z79.85 LONG-TERM (CURRENT) USE OF INJECTABLE NON-INSULIN ANTIDIABETIC DRUGS: ICD-10-CM

## 2023-07-20 DIAGNOSIS — Z89.422 ACQUIRED ABSENCE OF OTHER LEFT TOE(S): ICD-10-CM

## 2023-07-20 DIAGNOSIS — I10 ESSENTIAL (PRIMARY) HYPERTENSION: ICD-10-CM

## 2023-07-20 DIAGNOSIS — R22.41 LOCALIZED SWELLING, MASS AND LUMP, RIGHT LOWER LIMB: ICD-10-CM

## 2023-07-20 DIAGNOSIS — Z79.82 LONG TERM (CURRENT) USE OF ASPIRIN: ICD-10-CM

## 2023-07-20 DIAGNOSIS — Z79.4 LONG TERM (CURRENT) USE OF INSULIN: ICD-10-CM

## 2023-07-20 DIAGNOSIS — M25.461 EFFUSION, RIGHT KNEE: ICD-10-CM

## 2023-07-20 DIAGNOSIS — E11.9 TYPE 2 DIABETES MELLITUS WITHOUT COMPLICATIONS: ICD-10-CM

## 2023-07-20 DIAGNOSIS — Z88.2 ALLERGY STATUS TO SULFONAMIDES: ICD-10-CM

## 2023-07-20 DIAGNOSIS — E78.5 HYPERLIPIDEMIA, UNSPECIFIED: ICD-10-CM

## 2023-07-20 DIAGNOSIS — Z98.1 ARTHRODESIS STATUS: Chronic | ICD-10-CM

## 2023-07-20 PROCEDURE — 73562 X-RAY EXAM OF KNEE 3: CPT | Mod: RT

## 2023-07-20 PROCEDURE — 99284 EMERGENCY DEPT VISIT MOD MDM: CPT | Mod: FS

## 2023-07-20 PROCEDURE — 99283 EMERGENCY DEPT VISIT LOW MDM: CPT | Mod: 25

## 2023-07-20 PROCEDURE — 73562 X-RAY EXAM OF KNEE 3: CPT | Mod: 26,RT

## 2023-07-20 NOTE — ED STATDOCS - CARE PROVIDER_API CALL
Manny Miller  Orthopaedic Surgery  [address], [address]  [place] [address]  Phone: [phone]  Fax: [phone]  Follow Up Time:

## 2023-07-20 NOTE — ED STATDOCS - ENMT, MLM
Pt arrived to ED from home with c/o dizziness, nausea, vomiting, diarrhea worse today. Pt reports her period has been very heavy and she has had this problem in the past where she has needed a blood transfusion.   
Nasal mucosa clear.  Mouth with normal mucosa  Throat has no vesicles, no oropharyngeal exudates and uvula is midline.

## 2023-07-20 NOTE — ED STATDOCS - PROGRESS NOTE DETAILS
XR negative for fx, pt advised that she will need to see ortho as outpt for knee drainage, pt frustrated she is unable to have knee drained now, explained that therapeutic drainage is not an emergent procedure in the ED pt became more frustrated and left the ED.  pt is able to ambulate with steady gait  Apolonia Forbes PA-C Patient seen and evaluated, ED attending note and orders reviewed, will continue with patient follow up and care -Apolonia Forbes PA-C

## 2023-07-20 NOTE — ED STATDOCS - OBJECTIVE STATEMENT
64 y/o female w/ PMHx of DM, HTN, HLD presents to the ED c/o right knee pain and swelling. States that she usually has it drained by her orthopedist, but is unable to see him due to insurance issues. Pt states she is unable to walk due to pain. Denies fevers. 62 y/o female w/ PMHx of DM, HTN, HLD presents to the ED c/o right knee pain and swelling. States that she usually has it drained by her orthopedist, but is unable to see him due to insurance issues. Pt states she has difficulty walking 2/2 to pain; Denies fevers.

## 2023-07-20 NOTE — ED STATDOCS - NSFOLLOWUPINSTRUCTIONS_ED_ALL_ED_FT
Knee Effusion  Knee effusion refers to excess fluid in the knee joint. This can cause pain and swelling in your knee. Knee effusion creates more pressure than usual in your knee joint. This makes it more difficult for you to bend and move your knee. If there is fluid in your knee, it often means that something is wrong inside your knee. This can be a result of:  Severe arthritis.  Injury to the knee muscles or to tissues in the knee (ligaments or cartilage).  Infection.  Autoimmune disease. This means that your body's defense system (immune system) mistakenly attacks healthy body tissues.  Follow these instructions at home:  If you have a brace or an immobilizer:    Wear it as told by your health care provider. Remove it only as told by your health care provider.  Check the skin around it every day. Tell your health care provider about any concerns.  Loosen it if your toes tingle, become numb, or turn cold and blue.  Keep it clean.  If the brace or immobilizer is not waterproof:  Do not let it get wet.  Cover it with a watertight covering when you take a bath or shower.  Managing pain, stiffness, and swelling      If directed, put ice on the affected area. To do this:  If you have a removable brace or immobilizer, remove it as told by your health care provider.  Put ice in a plastic bag.  Place a towel between your skin and the bag.  Leave the ice on for 20 minutes, 2–3 times a day.  Remove the ice if your skin turns bright red. This is very important. If you cannot feel pain, heat, or cold, you have a greater risk of damage to the area.  Move your toes often to reduce stiffness and swelling.  Raise (elevate) your knee above the level of your heart while you are sitting or lying down.  Activity    Rest as told by your health care provider.  Do not use the injured limb to support your body weight until your health care provider says that you can. Use crutches as told by your health care provider.  Do exercises as told by your health care provider.  General instructions    Take over-the-counter and prescription medicines only as told by your health care provider.  Do not use any products that contain nicotine or tobacco. These products include cigarettes, chewing tobacco, and vaping devices, such as e-cigarettes. If you need help quitting, ask your health care provider.  Wear an elastic bandage or a wrap that puts pressure on your knee (compression wrap) as told by your health care provider.  Keep all follow-up visits. This is important.  Contact a health care provider if:  You continue to have pain in your knee.  You have a fever or chills.  Get help right away if:  You have swelling or redness in your knee that gets worse or does not get better.  You have severe pain in your knee.  Summary  Knee effusion refers to excess fluid in the knee joint. This causes pain and swelling and makes it difficult to bend and move your knee.  Effusion may be caused by severe arthritis, autoimmune disease, infection, or injury to the knee muscles or to tissues in the knee (ligaments or cartilage).  Take over-the-counter and prescription medicines only as told by your health care provider.  If you have a brace or an immobilizer, wear it as told by your health care provider.  This information is not intended to replace advice given to you by your health care provider. Make sure you discuss any questions you have with your health care provider.

## 2023-07-20 NOTE — ED STATDOCS - CLINICAL SUMMARY MEDICAL DECISION MAKING FREE TEXT BOX
64 y/o female w/ knee effusion, pt with no redness, no warmth, no fevers. Pt with h/o knee effusion in the past. Will order x-rays and re-eval.

## 2023-07-20 NOTE — ED STATDOCS - PATIENT PORTAL LINK FT
You can access the FollowMyHealth Patient Portal offered by [facility] by registering at the following website: [email or web address]. By joining [employer]’s FollowMyHealth portal, you will also be able to view your health information using other applications (apps) compatible with our system.

## 2023-07-20 NOTE — ED ADULT TRIAGE NOTE - CHIEF COMPLAINT QUOTE
pt arrived c/o right knee pain due ot fluid accumulation. pt states her MD told her he could not drain site. pt states she has had to have site drained in the past. endorsing pain with walking. unable to visualize site in triage

## 2023-07-20 NOTE — ED STATDOCS - ATTENDING APP SHARED VISIT CONTRIBUTION OF CARE
I, Cindy Lanier DO,  performed the initial face to face bedside interview with this patient regarding history of present illness, review of symptoms and relevant past medical, social and family history.  I completed an independent physical examination.  I was the initial provider who evaluated this patient.   I personally saw the patient and performed a substantive portion of the visit including all aspects of the medical decision making.  I have signed out the follow up of any pending tests (i.e. labs, radiological studies) to the ACP.  I have communicated the patient’s plan of care and disposition with the ACP.  The history, relevant review of systems, past medical and surgical history, medical decision making, and physical examination was documented by the scribe in my presence and I attest to the accuracy of the documentation.

## 2023-08-15 ENCOUNTER — EMERGENCY (EMERGENCY)
Facility: HOSPITAL | Age: 63
LOS: 0 days | Discharge: HOME | End: 2023-08-15
Attending: EMERGENCY MEDICINE
Payer: COMMERCIAL

## 2023-08-15 VITALS
WEIGHT: 182.1 LBS | OXYGEN SATURATION: 100 % | SYSTOLIC BLOOD PRESSURE: 146 MMHG | HEIGHT: 65 IN | HEART RATE: 75 BPM | TEMPERATURE: 98 F | RESPIRATION RATE: 18 BRPM | DIASTOLIC BLOOD PRESSURE: 100 MMHG

## 2023-08-15 VITALS
SYSTOLIC BLOOD PRESSURE: 130 MMHG | OXYGEN SATURATION: 99 % | TEMPERATURE: 98 F | DIASTOLIC BLOOD PRESSURE: 60 MMHG | RESPIRATION RATE: 18 BRPM | HEART RATE: 85 BPM

## 2023-08-15 DIAGNOSIS — Z98.1 ARTHRODESIS STATUS: Chronic | ICD-10-CM

## 2023-08-15 DIAGNOSIS — S93.602A UNSPECIFIED SPRAIN OF LEFT FOOT, INITIAL ENCOUNTER: ICD-10-CM

## 2023-08-15 DIAGNOSIS — Z79.82 LONG TERM (CURRENT) USE OF ASPIRIN: ICD-10-CM

## 2023-08-15 DIAGNOSIS — Z79.4 LONG TERM (CURRENT) USE OF INSULIN: ICD-10-CM

## 2023-08-15 DIAGNOSIS — I10 ESSENTIAL (PRIMARY) HYPERTENSION: ICD-10-CM

## 2023-08-15 DIAGNOSIS — M79.89 OTHER SPECIFIED SOFT TISSUE DISORDERS: ICD-10-CM

## 2023-08-15 DIAGNOSIS — Z89.422 ACQUIRED ABSENCE OF OTHER LEFT TOE(S): Chronic | ICD-10-CM

## 2023-08-15 DIAGNOSIS — L97.529 NON-PRESSURE CHRONIC ULCER OF OTHER PART OF LEFT FOOT WITH UNSPECIFIED SEVERITY: ICD-10-CM

## 2023-08-15 DIAGNOSIS — R79.82 ELEVATED C-REACTIVE PROTEIN (CRP): ICD-10-CM

## 2023-08-15 DIAGNOSIS — E78.5 HYPERLIPIDEMIA, UNSPECIFIED: ICD-10-CM
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
## 2023-08-15 DIAGNOSIS — E11.621 TYPE 2 DIABETES MELLITUS WITH FOOT ULCER: ICD-10-CM

## 2023-08-15 DIAGNOSIS — Z88.2 ALLERGY STATUS TO SULFONAMIDES: ICD-10-CM
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
## 2023-08-15 DIAGNOSIS — Z89.422 ACQUIRED ABSENCE OF OTHER LEFT TOE(S): ICD-10-CM

## 2023-08-15 LAB
ALBUMIN SERPL ELPH-MCNC: 3.6 G/DL — SIGNIFICANT CHANGE UP (ref 3.3–5)
ALP SERPL-CCNC: 109 U/L — SIGNIFICANT CHANGE UP (ref 40–120)
ALT FLD-CCNC: 29 U/L — SIGNIFICANT CHANGE UP (ref 12–78)
ANION GAP SERPL CALC-SCNC: 4 MMOL/L — LOW (ref 5–17)
APTT BLD: 29 SEC — SIGNIFICANT CHANGE UP (ref 24.5–35.6)
AST SERPL-CCNC: 14 U/L — LOW (ref 15–37)
BASOPHILS # BLD AUTO: 0.05 K/UL — SIGNIFICANT CHANGE UP (ref 0–0.2)
BASOPHILS NFR BLD AUTO: 0.6 % — SIGNIFICANT CHANGE UP (ref 0–2)
BILIRUB SERPL-MCNC: 0.5 MG/DL — SIGNIFICANT CHANGE UP (ref 0.2–1.2)
BUN SERPL-MCNC: 12 MG/DL — SIGNIFICANT CHANGE UP (ref 7–23)
CALCIUM SERPL-MCNC: 9.4 MG/DL — SIGNIFICANT CHANGE UP (ref 8.5–10.1)
CHLORIDE SERPL-SCNC: 104 MMOL/L — SIGNIFICANT CHANGE UP (ref 96–108)
CO2 SERPL-SCNC: 28 MMOL/L — SIGNIFICANT CHANGE UP (ref 22–31)
CREAT SERPL-MCNC: 0.77 MG/DL — SIGNIFICANT CHANGE UP (ref 0.5–1.3)
EGFR: 88 ML/MIN/1.73M2 — SIGNIFICANT CHANGE UP
EOSINOPHIL # BLD AUTO: 0.07 K/UL — SIGNIFICANT CHANGE UP (ref 0–0.5)
EOSINOPHIL NFR BLD AUTO: 0.8 % — SIGNIFICANT CHANGE UP (ref 0–6)
ERYTHROCYTE [SEDIMENTATION RATE] IN BLOOD: 26 MM/HR — HIGH (ref 0–20)
GLUCOSE BLDC GLUCOMTR-MCNC: 270 MG/DL — HIGH (ref 70–99)
GLUCOSE SERPL-MCNC: 233 MG/DL — HIGH (ref 70–99)
HCT VFR BLD CALC: 36.8 % — SIGNIFICANT CHANGE UP (ref 34.5–45)
HGB BLD-MCNC: 11.9 G/DL — SIGNIFICANT CHANGE UP (ref 11.5–15.5)
IMM GRANULOCYTES NFR BLD AUTO: 0.3 % — SIGNIFICANT CHANGE UP (ref 0–0.9)
INR BLD: 0.91 RATIO — SIGNIFICANT CHANGE UP (ref 0.85–1.18)
LACTATE SERPL-SCNC: 1.1 MMOL/L — SIGNIFICANT CHANGE UP (ref 0.7–2)
LYMPHOCYTES # BLD AUTO: 2.13 K/UL — SIGNIFICANT CHANGE UP (ref 1–3.3)
LYMPHOCYTES # BLD AUTO: 23.6 % — SIGNIFICANT CHANGE UP (ref 13–44)
MCHC RBC-ENTMCNC: 26.1 PG — LOW (ref 27–34)
MCHC RBC-ENTMCNC: 32.3 GM/DL — SIGNIFICANT CHANGE UP (ref 32–36)
MCV RBC AUTO: 80.7 FL — SIGNIFICANT CHANGE UP (ref 80–100)
MONOCYTES # BLD AUTO: 1.38 K/UL — HIGH (ref 0–0.9)
MONOCYTES NFR BLD AUTO: 15.3 % — HIGH (ref 2–14)
NEUTROPHILS # BLD AUTO: 5.38 K/UL — SIGNIFICANT CHANGE UP (ref 1.8–7.4)
NEUTROPHILS NFR BLD AUTO: 59.4 % — SIGNIFICANT CHANGE UP (ref 43–77)
PLATELET # BLD AUTO: 299 K/UL — SIGNIFICANT CHANGE UP (ref 150–400)
POTASSIUM SERPL-MCNC: 4.5 MMOL/L — SIGNIFICANT CHANGE UP (ref 3.5–5.3)
POTASSIUM SERPL-SCNC: 4.5 MMOL/L — SIGNIFICANT CHANGE UP (ref 3.5–5.3)
PROT SERPL-MCNC: 7.9 GM/DL — SIGNIFICANT CHANGE UP (ref 6–8.3)
PROTHROM AB SERPL-ACNC: 10.3 SEC — SIGNIFICANT CHANGE UP (ref 9.5–13)
RBC # BLD: 4.56 M/UL — SIGNIFICANT CHANGE UP (ref 3.8–5.2)
RBC # FLD: 14.3 % — SIGNIFICANT CHANGE UP (ref 10.3–14.5)
SODIUM SERPL-SCNC: 136 MMOL/L — SIGNIFICANT CHANGE UP (ref 135–145)
WBC # BLD: 9.04 K/UL — SIGNIFICANT CHANGE UP (ref 3.8–10.5)
WBC # FLD AUTO: 9.04 K/UL — SIGNIFICANT CHANGE UP (ref 3.8–10.5)

## 2023-08-15 PROCEDURE — 80053 COMPREHEN METABOLIC PANEL: CPT

## 2023-08-15 PROCEDURE — G1004: CPT

## 2023-08-15 PROCEDURE — 82962 GLUCOSE BLOOD TEST: CPT

## 2023-08-15 PROCEDURE — 96374 THER/PROPH/DIAG INJ IV PUSH: CPT | Mod: XU

## 2023-08-15 PROCEDURE — 73701 CT LOWER EXTREMITY W/DYE: CPT | Mod: MG,LT

## 2023-08-15 PROCEDURE — 73630 X-RAY EXAM OF FOOT: CPT | Mod: 26,50

## 2023-08-15 PROCEDURE — 85025 COMPLETE CBC W/AUTO DIFF WBC: CPT

## 2023-08-15 PROCEDURE — 73701 CT LOWER EXTREMITY W/DYE: CPT | Mod: 26,LT,MG

## 2023-08-15 PROCEDURE — 36415 COLL VENOUS BLD VENIPUNCTURE: CPT

## 2023-08-15 PROCEDURE — 83605 ASSAY OF LACTIC ACID: CPT

## 2023-08-15 PROCEDURE — 99285 EMERGENCY DEPT VISIT HI MDM: CPT

## 2023-08-15 PROCEDURE — 99285 EMERGENCY DEPT VISIT HI MDM: CPT | Mod: 25

## 2023-08-15 PROCEDURE — 85652 RBC SED RATE AUTOMATED: CPT

## 2023-08-15 PROCEDURE — 87040 BLOOD CULTURE FOR BACTERIA: CPT

## 2023-08-15 PROCEDURE — 85610 PROTHROMBIN TIME: CPT

## 2023-08-15 PROCEDURE — 85730 THROMBOPLASTIN TIME PARTIAL: CPT

## 2023-08-15 PROCEDURE — 73630 X-RAY EXAM OF FOOT: CPT | Mod: 50

## 2023-08-15 PROCEDURE — 96375 TX/PRO/DX INJ NEW DRUG ADDON: CPT | Mod: XU

## 2023-08-15 PROCEDURE — 86140 C-REACTIVE PROTEIN: CPT

## 2023-08-15 RX ORDER — VANCOMYCIN HCL 1 G
1250 VIAL (EA) INTRAVENOUS ONCE
Refills: 0 | Status: COMPLETED | OUTPATIENT
Start: 2023-08-15 | End: 2023-08-15

## 2023-08-15 RX ORDER — PIPERACILLIN AND TAZOBACTAM 4; .5 G/20ML; G/20ML
3.38 INJECTION, POWDER, LYOPHILIZED, FOR SOLUTION INTRAVENOUS ONCE
Refills: 0 | Status: COMPLETED | OUTPATIENT
Start: 2023-08-15 | End: 2023-08-15

## 2023-08-15 RX ORDER — INSULIN LISPRO 100/ML
6 VIAL (ML) SUBCUTANEOUS ONCE
Refills: 0 | Status: COMPLETED | OUTPATIENT
Start: 2023-08-15 | End: 2023-08-15

## 2023-08-15 RX ADMIN — PIPERACILLIN AND TAZOBACTAM 200 GRAM(S): 4; .5 INJECTION, POWDER, LYOPHILIZED, FOR SOLUTION INTRAVENOUS at 16:58

## 2023-08-15 RX ADMIN — Medication 6 UNIT(S): at 19:37

## 2023-08-15 RX ADMIN — Medication 250 MILLIGRAM(S): at 17:54

## 2023-08-15 NOTE — ED ADULT NURSE NOTE - CHIEF COMPLAINT QUOTE
pt presents to Ed with complaints of left foot swelling starting today. pt has ongoing foot issue from a work related injury.
General (Pediatric)

## 2023-08-15 NOTE — ED STATDOCS - NSFOLLOWUPINSTRUCTIONS_ED_ALL_ED_FT
Fracture    A fracture is a break in one of your bones. This can occur from a variety of injuries, especially traumatic ones. Symptoms include pain, bruising, or swelling. Do not use the injured limb. If a fracture is in one of the bones below your waist, do not put weight on that limb unless instructed to do so by your healthcare provider. Crutches or a cane may have been provided. A splint or cast may have been applied by your health care provider. Make sure to keep it dry and follow up with an orthopedist as instructed.    SEEK IMMEDIATE MEDICAL CARE IF YOU HAVE ANY OF THE FOLLOWING SYMPTOMS: numbness, tingling, increasing pain, or weakness in any part of the injured limb.    Cellulitis    Cellulitis is a skin infection caused by bacteria. This condition occurs most often in the arms and lower legs but can occur anywhere over the body. Symptoms include redness, swelling, warm skin, tenderness, and chills/fever. If you were prescribed an antibiotic medicine, take it as told by your health care provider. Do not stop taking the antibiotic even if you start to feel better.    SEEK IMMEDIATE MEDICAL CARE IF YOU HAVE ANY OF THE FOLLOWING SYMPTOMS: worsening fever, red streaks coming from affected area, vomiting or diarrhea, or dizziness/lightheadedness.    fu with podiatry as directed  take antibiotics as directed

## 2023-08-15 NOTE — ED STATDOCS - CARE PROVIDER_API CALL
Hermes Bermudez  Foot and Ankle Surgery  [address], [address]  [place] [address]  Phone: [phone]  Fax: [phone]  Follow Up Time: Urgent

## 2023-08-15 NOTE — ED STATDOCS - OBJECTIVE STATEMENT
62 y/o female w/PMHx of DM, HTN, HLD presents to ED c/o left foot swelling onset last night. Pt has chronic foot issue from a work related injury in 2009 s/p internal fixation. Reports she put too much pressure to left foot.

## 2023-08-15 NOTE — ED STATDOCS - PHYSICAL EXAMINATION
Gen: Well appearing in NAD  Head: NC/AT  Neck: Trachea midline  Resp: No distress  Ext: Left forefoot with significant swelling erythema some warmth, great toe with small ulceration  Neuro: A&O appears non focal  Skin: Warm and dry as visualized  Psych: Normal affect and mood

## 2023-08-15 NOTE — ED ADULT NURSE REASSESSMENT NOTE - NS ED NURSE REASSESS COMMENT FT1
Food order was placed at 1830. Called dietary department and stated they are running 30 minutes behind and will bring food order up to patient. When food is delivered, insulin will be administered as per order.

## 2023-08-15 NOTE — ED ADULT NURSE NOTE - NSSUHOSCREENINGYN_ED_ALL_ED
Addended by: JEAN PIERRE ARITA on: 9/25/2020 07:44 AM     Modules accepted: Orders    
Yes - the patient is able to be screened

## 2023-08-15 NOTE — ED STATDOCS - PROGRESS NOTE DETAILS
Podiatry team eval and recommend CT , discussed CT results with podiatry team will reconsult. -Beata Wynn PA-C pt okay to dc and fu with Dr. Bermudez with Doxy, podiatry team reeval, pt in post op shoe. -Beata Wynn PA-C

## 2023-08-15 NOTE — ED ADULT TRIAGE NOTE - CHIEF COMPLAINT QUOTE
pt presents to Ed with complaints of left foot swelling starting today. pt has ongoing foot issue from a work related injury.

## 2023-08-15 NOTE — ED STATDOCS - CLINICAL SUMMARY MEDICAL DECISION MAKING FREE TEXT BOX
Patient with swelling to foot, patient claims swelling is traumatic however small ulceration on side of foot with concern for diabetic foot ulcer, will check labs x-ray reassess

## 2023-08-15 NOTE — ED ADULT NURSE NOTE - OBJECTIVE STATEMENT
pT C/O LEFT FOOT REDNESS AND SWELLING pain full when putting weight on the foot. pt is a diabetic h/o b/l toes amputation , left foot 4th darker .

## 2023-08-15 NOTE — ED STATDOCS - PATIENT PORTAL LINK FT
You can access the FollowMyHealth Patient Portal offered by [facility] by registering at the following website: [email or web address]. By joining [employer]’s FollowMyHealth portal, you will also be able to view your health information using other applications (apps) compatible with our system.

## 2023-08-16 LAB — CRP SERPL-MCNC: 134 MG/L — HIGH

## 2023-10-19 NOTE — ED ADULT NURSE NOTE - PRIMARY CARE PROVIDER
Dr. Hopper Rituxan Pregnancy And Lactation Text: This medication is Pregnancy Category C and it isn't know if it is safe during pregnancy. It is unknown if this medication is excreted in breast milk but similar antibodies are known to be excreted.

## 2023-10-26 NOTE — DIETITIAN INITIAL EVALUATION ADULT - CALCULATED FROM (G/KG)
79.24 Manual Repair Warning Statement: We plan on removing the manually selected variable below in favor of our much easier automatic structured text blocks found in the previous tab. We decided to do this to help make the flow better and give you the full power of structured data. Manual selection is never going to be ideal in our platform and I would encourage you to avoid using manual selection from this point on, especially since I will be sunsetting this feature. It is important that you do one of two things with the customized text below. First, you can save all of the text in a word file so you can have it for future reference. Second, transfer the text to the appropriate area in the Library tab. Lastly, if there is a flap or graft type which we do not have you need to let us know right away so I can add it in before the variable is hidden. No need to panic, we plan to give you roughly 6 months to make the change.

## 2023-11-29 NOTE — ED STATDOCS - DATE/TIME 1
RN called the pharmacy regarding medication requested, this Medication potassium has not been ordered by this office, and per [facility] pharmacy patient has not refilled since 2022, so refill declined.   15-Aug-2023 19:58

## 2024-02-20 NOTE — DIETITIAN INITIAL EVALUATION ADULT - OBTAIN WEEKLY WEIGHT
February 20, 2024      [facility]  [address] [address], [address]  [place] [address]  Phone: [phone]  Fax: [phone]       Patient: Tahir Trevino   YOB: 2004  Date of Visit: 02/20/2024    To Whom It May Concern:    Angle Trevino  was at [facility] on 02/20/2024. The patient may return to work/school on 02/21/2024 with no restrictions. If you have any questions or concerns, or if I can be of further assistance, please do not hesitate to contact me.    Sincerely,    Sofia Hernández MA     
yes

## 2024-04-25 NOTE — PROGRESS NOTE ADULT - PROVIDER SPECIALTY LIST ADULT
Hospitalist [facility] Medicine  Discharge Summary      Patient Name: Estella Arevalo  MRN: [id number]  MACKENZIE: [id number]  Patient Class: IP- Inpatient  Admission Date: 2024  Hospital Length of Stay: 2 days  Discharge Date and Time:  2024 10:20 AM  Attending Physician: Keven Gleason, *   Discharging Provider: Keven Gleason MD  Primary Care Provider: Fiordaliza Ma -    Primary Care Team: Networked reference to record PCT     HPI:     Estella Arevalo is a 72 y.o. female who has a past medical history of Addiction to drug, Alcohol abuse, Arthritis, Bipolar 1, Cataract, Cirrhosis of liver, Colon polyp, Convulsions, unspecified convulsion type, Coronary artery disease, Fall, GERD, Hepatitis C, History of psychiatric hospitalization, psychiatric care, violence, Hypertension, Low back pain, Radha, MI, Migraine headache, Psychiatric problem, Sleep difficulties, Suicide attempt, Therapy, and Thyroid disease, presented to the ED with CC of Bloody Emesis.     Patient presents to the ED via EMS from [facility] with main complaint of nausea and vomiting, vomit is coffee-ground emesis.  Patient has cirrhosis, but has never been told she has varices in the past.  She has noticed some blood coming out her rectum as well in the last couple days.  In the ED patient's hemoglobin was 11.5, with an INR of 1.0.  Repeat hemoglobin was 10.4.  Patient states that her   recently, her daughter was taken back to care home, and her sister passed away recently as well-all leading to her drinking a lot more alcohol.  Patient is currently CEC'd, due to these reasons.  Patient also has blood in her urine, +3 at 72 years old.  Denies any chest pain or shortness of breath.  Recognizes and admits that she needs to quit drinking. CT also showed concerning signs of proctitis/bowel inflammation. Started on Octreotide, PPI and Zosyn.     Procedure(s) (LRB):  EGD (ESOPHAGOGASTRODUODENOSCOPY) (N/A)       Hospital Course:   Estella Arevalo is a 72 y.o. female who has a past medical history of Addiction to drug, Alcohol abuse, Arthritis, Bipolar 1, Cataract, Cirrhosis of liver, Colon polyp, Convulsions, unspecified convulsion type, Coronary artery disease, Fall, GERD, Hepatitis C, History of psychiatric hospitalization, psychiatric care, violence, Hypertension, Low back pain, Radha, MI, Migraine headache, Psychiatric problem, Sleep difficulties, Suicide attempt, Therapy, and Thyroid disease, presented to the ED with CC of Bloody Emesis.   Patient presents to the ED via EMS from [facility] with main complaint of nausea and vomiting, vomit is coffee-ground emesis.  Patient has cirrhosis, but has never been told she has varices in the past.  She has noticed some blood coming out her rectum as well in the last couple days.  In the ED patient's hemoglobin was 11.5, with an INR of 1.0.  Repeat hemoglobin was 10.4.  Patient states that her   recently, her daughter was taken back to [facility], and her sister passed away recently as well-all leading to her drinking a lot more alcohol.  Patient is currently CEC'd, due to these reasons.  Patient also has blood in her urine, +3 at 72 years old.  Denies any chest pain or shortness of breath.  Seen by GI,patient had EGD,show esophagitis ,her HH remains stable,no more bleeding seen,psychiatry say patient need remains CEC and go back to [facility],patient is medically stable for discharged to [facility].     Goals of Care Treatment Preferences:  Code Status: Full Code      Consults:   Consults (From admission, onward)          Status Ordering Provider     Inpatient consult to [facility]  Once        Provider:  Ralph Ramos MD    Completed ANAI BRYAN     Inpatient consult to Gastroenterology  Once        Provider:  Jean Pierre Moran MD    Completed ANAI BRYAN     Inpatient consult to Gastroenterology  Once        Provider:  Jean Pierre Moran  MD    Completed JOHN CORONADO            No new Assessment & Plan notes have been filed under this hospital service since the last note was generated.  Service: Hospital Medicine    Final Active Diagnoses:    Diagnosis Date Noted POA    PRINCIPAL PROBLEM:  Coffee ground emesis [K92.0] 04/23/2024 Yes    Neuropathic foot ulcer [L97.509] 04/24/2024 Yes    Recurrent falls [R29.6] 11/13/2023 Not Applicable    Alcohol abuse [F10.10] 09/25/2019 Yes     Chronic    History of hepatitis C [Z86.19] 01/04/2019 Yes    Essential hypertension [I10] 07/16/2018 Yes     Chronic    Chronic hepatitis C [B18.2] 07/16/2018 Yes     Chronic    Bipolar 1 disorder [F31.9] 12/30/2016 Yes     Chronic    Depression [F32.A]  Yes    Polysubstance dependence including opioid type drug, continuous use [F11.20, F19.20] 07/14/2013 Yes     Chronic      Problems Resolved During this Admission:       Discharged Condition: stable    Disposition: Another Health Care Inst*    Follow Up:   Follow-up Information       [facility] Follow up on 4/26/2024.    Why: @11:10am, for Hospital Follow up, for Gastorenterology follow up  Contact information:  [place]    [phone]                         Patient Instructions:   No discharge procedures on file.    Significant Diagnostic Studies: Labs: BMP:   Recent Labs   Lab 04/23/24  1559 04/24/24  0647 04/25/24  0445   * 136* 109   * 134* 135*   K 4.1 4.3 4.6    101 103   CO2 23 25 22*   BUN 13 13 15   CREATININE 0.7 0.8 0.7   CALCIUM 9.4 9.1 8.3*   MG  --  2.6 2.3   , CMP   Recent Labs   Lab 04/23/24  1559 04/24/24  0647 04/25/24  0445   * 134* 135*   K 4.1 4.3 4.6    101 103   CO2 23 25 22*   * 136* 109   BUN 13 13 15   CREATININE 0.7 0.8 0.7   CALCIUM 9.4 9.1 8.3*   PROT 9.0* 8.5* 7.1   ALBUMIN 3.9 3.5 3.0*   BILITOT 0.8 1.1* 0.6   ALKPHOS 271* 226* 185*   AST 87* 66* 106*   ALT 37 29 39   ANIONGAP 10 8 10   , and CBC   Recent Labs   Lab 04/23/24  1559 04/23/24  2845  04/25/24  0445   WBC 8.14  --  4.93   HGB 11.5* 10.4* 10.3*   HCT 35.7*  --  33.6*     --  261     Radiology: X-Ray: CXR: X-Ray Chest 1 View (CXR): No results found for this visit on 04/23/24. and X-Ray Chest PA and Lateral (CXR): No results found for this visit on 04/23/24.  Endoscopy: Gastroscopy:     Pending Diagnostic Studies:       Procedure Component Value Units Date/Time    Hepatitis C RNA, quantitative, PCR [[id number]] Collected: 04/24/24 0647    Order Status: Sent Lab Status: In process Updated: 04/24/24 1211    Specimen: Blood            Medications:  Reconciled Home Medications:      Medication List        CONTINUE taking these medications      ARIPiprazole 10 MG Tab  Commonly known as: ABILIFY  Take 10 mg by mouth once daily.     CENTRUM SILVER ORAL  Take 1 tablet by mouth.     EScitalopram oxalate 20 MG tablet  Commonly known as: LEXAPRO  Take 1 tablet (20 mg total) by mouth once daily.     gabapentin 600 MG tablet  Commonly known as: NEURONTIN  Take 600 mg by mouth 3 (three) times daily.     levothyroxine 25 MCG tablet  Commonly known as: SYNTHROID  Take 1 tablet (25 mcg total) by mouth once daily.     LINZESS 145 mcg Cap capsule  Generic drug: linaCLOtide  Take 145 mcg by mouth.     pantoprazole 40 MG tablet  Commonly known as: PROTONIX  Take 1 tablet (40 mg total) by mouth 2 (two) times daily.     PROCTOZONE-HC 2.5 % rectal cream  Generic drug: hydrocortisone  STACI RECTALLY BID     traZODone 100 MG tablet  Commonly known as: DESYREL  Take 1 tablet (100 mg total) by mouth every evening.            STOP taking these medications      HYDROcodone-acetaminophen 5-325 mg per tablet  Commonly known as: NORCO              Indwelling Lines/Drains at time of discharge:   Lines/Drains/Airways       None                   Time spent on the discharge of patient:  over 30  minutes         Keven Gleason MD  Department of Hospital Medicine  [facility]

## 2024-05-05 NOTE — ED STATDOCS - CPE ED ENMT NORM
Problem: Depressive Symptoms  Goal: #Depressive s/s (self-reported/observed) are recognized and monitored  Outcome: Monitoring/Evaluating progress  Goal: #Verbalizes understanding of depressive symptoms management  Description: Document in Patient Education Activity  Outcome: Monitoring/Evaluating progress     Problem: Adult Mental Health  Goal: Reports or exhibits reduction in symptoms associated with elevated or labile mood/candace  Outcome: Monitoring/Evaluating progress  Goal: Reports or exhibits improvement in depressive mood signs/symptoms  Outcome: Monitoring/Evaluating progress  Goal: Reports or exhibits an improvement in mood signs/symptoms associated with anxiety  Outcome: Monitoring/Evaluating progress  Goal: Demonstrates ability to proactively perform self cares  Outcome: Monitoring/Evaluating progress  Goal: Demonstrates improved ability to track conversation, process information  Outcome: Monitoring/Evaluating progress  Goal: Verbalizes understanding of positive individual coping skills  Outcome: Monitoring/Evaluating progress  Goal: Verbalizes understanding of diagnosis, reason for treament and treatment program  Outcome: Monitoring/Evaluating progress  Goal: Verbalizes understanding of medication management/monitoring/assessment of effectiveness  Outcome: Monitoring/Evaluating progress     Problem: Adult Mental Health  Goal: Demonstrates the ability to engage in reality-based communication and refrains from acting on delusional thoughts  Outcome: Met, complete goal  Goal: Reports or exhibits hallucinations absent or at manageable level  Outcome: Met, complete goal  Goal: Reports decrease in thoughts of suicide  Outcome: Met, complete goal  Goal: Reports decrease in thoughts of self harm  Outcome: Met, complete goal  Goal: Verbalizes when symptoms of illness are triggered and reports to staff when experiencing urges/intent of suicide  Outcome: Met, complete goal  Goal: Verbalizes when symptoms of  illness are triggered and reports to staff when experiencing urges/intent of self harm  Outcome: Met, complete goal  Goal: Denies having a suicidal plan  Outcome: Met, complete goal      normal...

## 2024-08-08 NOTE — PHYSICAL THERAPY INITIAL EVALUATION ADULT - ASSISTIVE DEVICE FOR TRANSFER: STAND/SIT, REHAB EVAL
[FreeTextEntry1] : # Mycosis fungoides, patch stage, hypopigmented - stage IB (T2N0) - s/p bx 4/8/24 c/w MF - continue nb-uvb - started 5/15/24, stopped 6/28/24. Will restart at initial dose (325 mJ)  # Seborrheic dermatitis, moderate to severe, scalp/ears/face - chronic, flaring (favor seb derm on face over MF) - c/w ciclopirox shampoo every other day to scalp, leave in 5-10 min before rinsing - start fluocinolone oil every other day to scalp, leave on several hours prior to hair wash. Side effects discussed. - For face/ears, c/w ketoconazole 2% cream BID to affected areas. SED - ADD hydrocortisone 2.5% for redness on face BID as needed   # hypertrophic scar, right upper back from prior biopsy site - discussed dx, spectrum of tx options including ILK, laser, surgical removal - pt elects to proceed with ilk Intralesional injection of Kenalog, 40 mg/ml A total of 0.1 ml was injected. The risks/benefits/alternatives of intralesional steroid injections were reviewed with the patient which include but are not limited to pain, atrophy, and dyspigmentation, need for repeat injection. Intralesional injections were performed in the affected area. The patient tolerated the procedure well.  
axillary crutches

## 2024-10-02 NOTE — PATIENT PROFILE ADULT - NSPROPOAPRESSUREINJURY_GEN_A_NUR
CERTIFICATE OF WORK       October 2, 2024      Re: Kirstie Hernandez  [address]   [place] [address]      This is to certify that Kirstie Hernandez has been under my care from 10/2/2024 and can return to modified work. She cannot lift more than 10 pounds.    RESTRICTIONS: 10 pound lifting restriction for 4 weeks      SIGNATURE:___________________________________________          Shantel Trimble MD  [facility]  [address] [address]  Phone: [phone]   no

## 2024-12-04 NOTE — ED ADULT NURSE NOTE - ISOLATION PROVIDED EDUCATION
Electrophysiology Consult Note      Reason for consultation: svt, pvc    Chief complaint : Palpitations, dizziness    Referring physician:       Primary care physician: Justa Nolan FNP      History of Present Illness:       History of Present Illness  The patient is a history of HTN, HLD, Prediabetic, SVT presents for evaluation of lightheadedness, dizziness, and palpitations.    Her symptoms, which began in 08/2023, include lightheadedness, dizziness, and a sensation of fainting accompanied by a rapid heartbeat. These episodes have been occurring for several months, leaving her feeling fatigued and weak. She experiences these episodes once or twice daily, particularly when standing up quickly. She also reports episodes of rapid heartbeat while on a monitor.    She has noticed an increase in her systolic blood pressure, even though she has discontinued her blood pressure medication. She has a history of prediabetes and has lost weight from 227 pounds. She avoids caffeine and maintains an active lifestyle.    She is currently on levothyroxine for thyroid management and has a thyroid nodule that has not shown growth.    She was referred by Dr. Rebollar after undergoing several tests.    Patient denies any caffeine use. Patient smokes less than 3-4 cigarettes a day. Patient denies any alcohol or drug use. Patient exercises regularly.        Pastmedical history:   Past Medical History:   Diagnosis Date    COPD (chronic obstructive pulmonary disease) (HCC)     Denies COPD, uses inhaler for SOB - prn - hx: smoking    H/O echocardiogram 08/28/2018    EF 55-60%. No significant valvular disease.     H/O echocardiogram 08/14/2024    EF of 55 - 60%. Mild septal thickening Aortic Valve: Mild sclerosis of the aortic valve, noncoronary cusp. Interatrial Septum: No interatrial shunt visualized with color Doppler. The septum is mildly bowing into the RA. Tricuspid Valve: Normal RVSP. 
Patient

## 2024-12-31 ENCOUNTER — EMERGENCY (EMERGENCY)
Facility: HOSPITAL | Age: 64
LOS: 0 days | Discharge: LEFT BEFORE DISCHARGE | End: 2025-01-01
Attending: EMERGENCY MEDICINE
Payer: SELF-PAY

## 2024-12-31 VITALS
HEART RATE: 73 BPM | SYSTOLIC BLOOD PRESSURE: 158 MMHG | OXYGEN SATURATION: 100 % | DIASTOLIC BLOOD PRESSURE: 81 MMHG | RESPIRATION RATE: 18 BRPM

## 2024-12-31 VITALS — WEIGHT: 179.9 LBS | HEIGHT: 68 IN

## 2024-12-31 DIAGNOSIS — I10 ESSENTIAL (PRIMARY) HYPERTENSION: ICD-10-CM

## 2024-12-31 DIAGNOSIS — E78.5 HYPERLIPIDEMIA, UNSPECIFIED: ICD-10-CM

## 2024-12-31 DIAGNOSIS — E11.9 TYPE 2 DIABETES MELLITUS WITHOUT COMPLICATIONS: ICD-10-CM

## 2024-12-31 DIAGNOSIS — R79.89 OTHER SPECIFIED ABNORMAL FINDINGS OF BLOOD CHEMISTRY: ICD-10-CM

## 2024-12-31 DIAGNOSIS — Z98.1 ARTHRODESIS STATUS: Chronic | ICD-10-CM

## 2024-12-31 DIAGNOSIS — R06.02 SHORTNESS OF BREATH: ICD-10-CM

## 2024-12-31 DIAGNOSIS — Z53.29 PROCEDURE AND TREATMENT NOT CARRIED OUT BECAUSE OF PATIENT'S DECISION FOR OTHER REASONS: ICD-10-CM

## 2024-12-31 DIAGNOSIS — Z89.422 ACQUIRED ABSENCE OF OTHER LEFT TOE(S): Chronic | ICD-10-CM

## 2024-12-31 DIAGNOSIS — Z79.82 LONG TERM (CURRENT) USE OF ASPIRIN: ICD-10-CM

## 2024-12-31 DIAGNOSIS — Z88.2 ALLERGY STATUS TO SULFONAMIDES: ICD-10-CM

## 2024-12-31 DIAGNOSIS — R07.2 PRECORDIAL PAIN: ICD-10-CM

## 2024-12-31 LAB
ALBUMIN SERPL ELPH-MCNC: 3.6 G/DL — SIGNIFICANT CHANGE UP (ref 3.3–5)
ALP SERPL-CCNC: 114 U/L — SIGNIFICANT CHANGE UP (ref 40–120)
ALT FLD-CCNC: 30 U/L — SIGNIFICANT CHANGE UP (ref 12–78)
ANION GAP SERPL CALC-SCNC: 3 MMOL/L — LOW (ref 5–17)
APTT BLD: 28 SEC — SIGNIFICANT CHANGE UP (ref 24.5–35.6)
AST SERPL-CCNC: 13 U/L — LOW (ref 15–37)
BASOPHILS # BLD AUTO: 0.03 K/UL — SIGNIFICANT CHANGE UP (ref 0–0.2)
BASOPHILS NFR BLD AUTO: 0.5 % — SIGNIFICANT CHANGE UP (ref 0–2)
BILIRUB SERPL-MCNC: 0.3 MG/DL — SIGNIFICANT CHANGE UP (ref 0.2–1.2)
BUN SERPL-MCNC: 18 MG/DL — SIGNIFICANT CHANGE UP (ref 7–23)
CALCIUM SERPL-MCNC: 9.5 MG/DL — SIGNIFICANT CHANGE UP (ref 8.5–10.1)
CHLORIDE SERPL-SCNC: 105 MMOL/L — SIGNIFICANT CHANGE UP (ref 96–108)
CO2 SERPL-SCNC: 28 MMOL/L — SIGNIFICANT CHANGE UP (ref 22–31)
CREAT SERPL-MCNC: 0.82 MG/DL — SIGNIFICANT CHANGE UP (ref 0.5–1.3)
EGFR: 81 ML/MIN/1.73M2 — SIGNIFICANT CHANGE UP
EOSINOPHIL # BLD AUTO: 0.03 K/UL — SIGNIFICANT CHANGE UP (ref 0–0.5)
EOSINOPHIL NFR BLD AUTO: 0.5 % — SIGNIFICANT CHANGE UP (ref 0–6)
GLUCOSE SERPL-MCNC: 255 MG/DL — HIGH (ref 70–99)
HCT VFR BLD CALC: 38.9 % — SIGNIFICANT CHANGE UP (ref 34.5–45)
HGB BLD-MCNC: 12.1 G/DL — SIGNIFICANT CHANGE UP (ref 11.5–15.5)
IMM GRANULOCYTES NFR BLD AUTO: 0.2 % — SIGNIFICANT CHANGE UP (ref 0–0.9)
INR BLD: 0.88 RATIO — SIGNIFICANT CHANGE UP (ref 0.85–1.16)
LYMPHOCYTES # BLD AUTO: 2.05 K/UL — SIGNIFICANT CHANGE UP (ref 1–3.3)
LYMPHOCYTES # BLD AUTO: 36.2 % — SIGNIFICANT CHANGE UP (ref 13–44)
MCHC RBC-ENTMCNC: 25.2 PG — LOW (ref 27–34)
MCHC RBC-ENTMCNC: 31.1 G/DL — LOW (ref 32–36)
MCV RBC AUTO: 81 FL — SIGNIFICANT CHANGE UP (ref 80–100)
MONOCYTES # BLD AUTO: 0.67 K/UL — SIGNIFICANT CHANGE UP (ref 0–0.9)
MONOCYTES NFR BLD AUTO: 11.8 % — SIGNIFICANT CHANGE UP (ref 2–14)
NEUTROPHILS # BLD AUTO: 2.87 K/UL — SIGNIFICANT CHANGE UP (ref 1.8–7.4)
NEUTROPHILS NFR BLD AUTO: 50.8 % — SIGNIFICANT CHANGE UP (ref 43–77)
PLATELET # BLD AUTO: 298 K/UL — SIGNIFICANT CHANGE UP (ref 150–400)
POTASSIUM SERPL-MCNC: 4.3 MMOL/L — SIGNIFICANT CHANGE UP (ref 3.5–5.3)
POTASSIUM SERPL-SCNC: 4.3 MMOL/L — SIGNIFICANT CHANGE UP (ref 3.5–5.3)
PROT SERPL-MCNC: 7.7 GM/DL — SIGNIFICANT CHANGE UP (ref 6–8.3)
PROTHROM AB SERPL-ACNC: 10.1 SEC — SIGNIFICANT CHANGE UP (ref 9.9–13.4)
RBC # BLD: 4.8 M/UL — SIGNIFICANT CHANGE UP (ref 3.8–5.2)
RBC # FLD: 14.5 % — SIGNIFICANT CHANGE UP (ref 10.3–14.5)
SODIUM SERPL-SCNC: 136 MMOL/L — SIGNIFICANT CHANGE UP (ref 135–145)
TROPONIN I, HIGH SENSITIVITY RESULT: 219.32 NG/L — HIGH
WBC # BLD: 5.66 K/UL — SIGNIFICANT CHANGE UP (ref 3.8–10.5)
WBC # FLD AUTO: 5.66 K/UL — SIGNIFICANT CHANGE UP (ref 3.8–10.5)

## 2024-12-31 PROCEDURE — 36415 COLL VENOUS BLD VENIPUNCTURE: CPT

## 2024-12-31 PROCEDURE — 93005 ELECTROCARDIOGRAM TRACING: CPT

## 2024-12-31 PROCEDURE — 93010 ELECTROCARDIOGRAM REPORT: CPT

## 2024-12-31 PROCEDURE — 86900 BLOOD TYPING SEROLOGIC ABO: CPT

## 2024-12-31 PROCEDURE — 99285 EMERGENCY DEPT VISIT HI MDM: CPT | Mod: 25

## 2024-12-31 PROCEDURE — 86901 BLOOD TYPING SEROLOGIC RH(D): CPT

## 2024-12-31 PROCEDURE — 86850 RBC ANTIBODY SCREEN: CPT

## 2024-12-31 PROCEDURE — 99285 EMERGENCY DEPT VISIT HI MDM: CPT

## 2024-12-31 PROCEDURE — 71045 X-RAY EXAM CHEST 1 VIEW: CPT | Mod: 26

## 2024-12-31 PROCEDURE — 80053 COMPREHEN METABOLIC PANEL: CPT

## 2024-12-31 PROCEDURE — 84484 ASSAY OF TROPONIN QUANT: CPT

## 2024-12-31 PROCEDURE — 85730 THROMBOPLASTIN TIME PARTIAL: CPT

## 2024-12-31 PROCEDURE — 85610 PROTHROMBIN TIME: CPT

## 2024-12-31 PROCEDURE — 85025 COMPLETE CBC W/AUTO DIFF WBC: CPT

## 2024-12-31 PROCEDURE — 71045 X-RAY EXAM CHEST 1 VIEW: CPT

## 2024-12-31 RX ORDER — NITROGLYCERIN 0.4MG/HR
0.4 PATCH, TRANSDERMAL 24 HOURS TRANSDERMAL ONCE
Refills: 0 | Status: COMPLETED | OUTPATIENT
Start: 2024-12-31 | End: 2024-12-31

## 2024-12-31 RX ADMIN — Medication 324 MILLIGRAM(S): at 20:08

## 2024-12-31 RX ADMIN — Medication 0.4 MILLIGRAM(S): at 20:08

## 2024-12-31 NOTE — ED STATDOCS - PROGRESS NOTE DETAILS
65 yo female with hx htn, with chest pain x 2 hours  EKG with ?abnormalities anteriorly, d/w Dr. Morales interventional cardiologist who recommends serial trops, and cardic workup in ED    -md beba 63 yo female with hx htn, with chest pain x 2 hours  EKG with ?abnormalities anteriorly, d/w Dr. Morales interventional cardiologist who recommends serial trops, and cardic workup in ED  cardiology requests echo be ordered, and will need urgently if troponin positive    -md beba

## 2024-12-31 NOTE — ED PROVIDER NOTE - CLINICAL SUMMARY MEDICAL DECISION MAKING FREE TEXT BOX
64y female w/ CP after becoming upset today. EKG normal. States she has no cardiologist. Will do serial troponin x2 as pain started at 3pm. CXR to r/o ACS.    19:38 Patient requesting nitroglycerin, /98, will give. States she does not want to be admitted and wants to go home.

## 2024-12-31 NOTE — ED PROVIDER NOTE - CARE PROVIDER_API CALL
Vandana Gottlieb  Cardiology  [address] [place] [id number]  Phone: [phone]  Fax: [phone]  Follow Up Time:

## 2024-12-31 NOTE — ED PROVIDER NOTE - OBJECTIVE STATEMENT
Pt is a 64y female w/ a PMHx of HTN, HLD, and DM who presents to the ED c/o L sided substernal CP onset 3pm at work. Pt states she was called into her supervisor's office for something that happened at work and then developed CP and came to the ED. On ASA. States the pain "lightening up now." Endorses SOB. Denies n/v/d. Has no cardiologist.

## 2024-12-31 NOTE — ED ADULT NURSE NOTE - NSFALLUNIVINTERV_ED_ALL_ED
Bed/Stretcher in lowest position, wheels locked, appropriate side rails in place/Call bell, personal items and telephone in reach/Instruct patient to call for assistance before getting out of bed/chair/stretcher/Non-slip footwear applied when patient is off stretcher/[place] to call system/Physically safe environment - no spills, clutter or unnecessary equipment/Purposeful proactive rounding/Room/bathroom lighting operational, light cord in reach

## 2024-12-31 NOTE — ED ADULT NURSE NOTE - PAIN: PRESENCE, MLM
Patient arrived via [facility] from nursing home. Nursing home states that patient had a fall from bed at 0300 today without LOC. Patient was assisted back into bed. This morning staff noticed deformity to left shoulder. Upon arrival patient had no complaints and no pain without movement. Patient is in a c-collar. Patient denies pain with movement of left arm, but complains of left hip pain when knee is bent. Patient has a temp of 100.7 upon arrival which was not reported by nursing home or EMS. Patient denies head, neck, or back pain or headache/blurred vision.   
Xray reviewed with patient during visit.
complains of pain/discomfort

## 2024-12-31 NOTE — ED ADULT NURSE NOTE - OBJECTIVE STATEMENT
pt ambulatory to ER for chest pain starting around 3pm today. pt reports being at work, states "I was called into the directors office and didn't have a good experience. it felt like my blood pressure was rising and my chest started to hurt". hx HTN. pt denies any other medical complaints.

## 2024-12-31 NOTE — ED PROVIDER NOTE - SCRIBE NAME
Thank you for coming to clinic. For your gastritis, I have prescribed a medication called famotidine. Take one tablet daily. This will help reduce the acid in your stomach.  
Tanner Boland

## 2024-12-31 NOTE — ED ADULT TRIAGE NOTE - CHIEF COMPLAINT QUOTE
pt ambulatory to er c/o 10/10 left sided chest pain x2 hours with left sided arm pain. endorsing shortness of breath. denies nausea/vomiting/fevers. sent for ekg.

## 2024-12-31 NOTE — ED PROVIDER NOTE - PHYSICAL EXAMINATION
Gen:  Well appearing in NAD  Head:  NC/AT  HEENT: pupils perrl,no pharyngeal erythema, uvula midline  Cardiac: S1S2, RRR  Abd: Soft, non tender  Resp: No distress, CTA   musculoskeletal:: no deformities, no swelling, strength +5/+5  Skin: warm and dry as visualized, no rashes  Neuro: BOO, aao x 4  Psych:alert, cooperative, appropriate mood and affect for situation

## 2024-12-31 NOTE — ED PROVIDER NOTE - PATIENT PORTAL LINK FT
You can access the FollowMyHealth Patient Portal offered by [facility] by registering at the following website: [email or web address]. By joining [facility]’s FollowMyHealth portal, you will also be able to view your health information using other applications (apps) compatible with our system.

## 2025-01-01 ENCOUNTER — INPATIENT (INPATIENT)
Facility: HOSPITAL | Age: 65
LOS: 1 days | Discharge: HOME | DRG: 313 | End: 2025-01-03
Attending: HOSPITALIST | Admitting: INTERNAL MEDICINE
Payer: MEDICARE

## 2025-01-01 VITALS — HEIGHT: 68 IN

## 2025-01-01 DIAGNOSIS — I10 ESSENTIAL (PRIMARY) HYPERTENSION: ICD-10-CM

## 2025-01-01 DIAGNOSIS — Z98.1 ARTHRODESIS STATUS: Chronic | ICD-10-CM

## 2025-01-01 DIAGNOSIS — E78.5 HYPERLIPIDEMIA, UNSPECIFIED: ICD-10-CM

## 2025-01-01 DIAGNOSIS — R07.9 CHEST PAIN, UNSPECIFIED: ICD-10-CM

## 2025-01-01 DIAGNOSIS — Z89.422 ACQUIRED ABSENCE OF OTHER LEFT TOE(S): Chronic | ICD-10-CM

## 2025-01-01 LAB
ALBUMIN SERPL ELPH-MCNC: 3.3 G/DL — SIGNIFICANT CHANGE UP (ref 3.3–5)
ALP SERPL-CCNC: 113 U/L — SIGNIFICANT CHANGE UP (ref 40–120)
ALT FLD-CCNC: 22 U/L — SIGNIFICANT CHANGE UP (ref 12–78)
ANION GAP SERPL CALC-SCNC: 5 MMOL/L — SIGNIFICANT CHANGE UP (ref 5–17)
APTT BLD: 26.1 SEC — SIGNIFICANT CHANGE UP (ref 24.5–35.6)
AST SERPL-CCNC: 14 U/L — LOW (ref 15–37)
BASOPHILS # BLD AUTO: 0.04 K/UL — SIGNIFICANT CHANGE UP (ref 0–0.2)
BASOPHILS NFR BLD AUTO: 0.8 % — SIGNIFICANT CHANGE UP (ref 0–2)
BILIRUB SERPL-MCNC: 0.3 MG/DL — SIGNIFICANT CHANGE UP (ref 0.2–1.2)
BUN SERPL-MCNC: 14 MG/DL — SIGNIFICANT CHANGE UP (ref 7–23)
CALCIUM SERPL-MCNC: 8.9 MG/DL — SIGNIFICANT CHANGE UP (ref 8.5–10.1)
CHLORIDE SERPL-SCNC: 103 MMOL/L — SIGNIFICANT CHANGE UP (ref 96–108)
CO2 SERPL-SCNC: 25 MMOL/L — SIGNIFICANT CHANGE UP (ref 22–31)
CREAT SERPL-MCNC: 0.87 MG/DL — SIGNIFICANT CHANGE UP (ref 0.5–1.3)
D DIMER BLD IA.RAPID-MCNC: 164 NG/ML DDU — SIGNIFICANT CHANGE UP
EGFR: 75 ML/MIN/1.73M2 — SIGNIFICANT CHANGE UP
EOSINOPHIL # BLD AUTO: 0.12 K/UL — SIGNIFICANT CHANGE UP (ref 0–0.5)
EOSINOPHIL NFR BLD AUTO: 2.5 % — SIGNIFICANT CHANGE UP (ref 0–6)
GLUCOSE BLDC GLUCOMTR-MCNC: 250 MG/DL — HIGH (ref 70–99)
GLUCOSE BLDC GLUCOMTR-MCNC: 299 MG/DL — HIGH (ref 70–99)
GLUCOSE BLDC GLUCOMTR-MCNC: 405 MG/DL — HIGH (ref 70–99)
GLUCOSE SERPL-MCNC: 325 MG/DL — HIGH (ref 70–99)
HCT VFR BLD CALC: 36.3 % — SIGNIFICANT CHANGE UP (ref 34.5–45)
HGB BLD-MCNC: 11.5 G/DL — SIGNIFICANT CHANGE UP (ref 11.5–15.5)
IMM GRANULOCYTES NFR BLD AUTO: 0.2 % — SIGNIFICANT CHANGE UP (ref 0–0.9)
INR BLD: 0.86 RATIO — SIGNIFICANT CHANGE UP (ref 0.85–1.16)
LYMPHOCYTES # BLD AUTO: 1.94 K/UL — SIGNIFICANT CHANGE UP (ref 1–3.3)
LYMPHOCYTES # BLD AUTO: 41.2 % — SIGNIFICANT CHANGE UP (ref 13–44)
MAGNESIUM SERPL-MCNC: 2.4 MG/DL — SIGNIFICANT CHANGE UP (ref 1.6–2.6)
MCHC RBC-ENTMCNC: 25.8 PG — LOW (ref 27–34)
MCHC RBC-ENTMCNC: 31.7 G/DL — LOW (ref 32–36)
MCV RBC AUTO: 81.4 FL — SIGNIFICANT CHANGE UP (ref 80–100)
MONOCYTES # BLD AUTO: 0.49 K/UL — SIGNIFICANT CHANGE UP (ref 0–0.9)
MONOCYTES NFR BLD AUTO: 10.4 % — SIGNIFICANT CHANGE UP (ref 2–14)
NEUTROPHILS # BLD AUTO: 2.11 K/UL — SIGNIFICANT CHANGE UP (ref 1.8–7.4)
NEUTROPHILS NFR BLD AUTO: 44.9 % — SIGNIFICANT CHANGE UP (ref 43–77)
NT-PROBNP SERPL-SCNC: 233 PG/ML — HIGH (ref 0–125)
PLATELET # BLD AUTO: 321 K/UL — SIGNIFICANT CHANGE UP (ref 150–400)
POTASSIUM SERPL-MCNC: 4.3 MMOL/L — SIGNIFICANT CHANGE UP (ref 3.5–5.3)
POTASSIUM SERPL-SCNC: 4.3 MMOL/L — SIGNIFICANT CHANGE UP (ref 3.5–5.3)
PROT SERPL-MCNC: 7.5 GM/DL — SIGNIFICANT CHANGE UP (ref 6–8.3)
PROTHROM AB SERPL-ACNC: 10.2 SEC — SIGNIFICANT CHANGE UP (ref 9.9–13.4)
RBC # BLD: 4.46 M/UL — SIGNIFICANT CHANGE UP (ref 3.8–5.2)
RBC # FLD: 14.5 % — SIGNIFICANT CHANGE UP (ref 10.3–14.5)
SODIUM SERPL-SCNC: 133 MMOL/L — LOW (ref 135–145)
TROPONIN I, HIGH SENSITIVITY RESULT: 47.65 NG/L — SIGNIFICANT CHANGE UP
TROPONIN I, HIGH SENSITIVITY RESULT: 67.59 NG/L — HIGH
TROPONIN I, HIGH SENSITIVITY RESULT: 85.93 NG/L — HIGH
WBC # BLD: 4.71 K/UL — SIGNIFICANT CHANGE UP (ref 3.8–10.5)
WBC # FLD AUTO: 4.71 K/UL — SIGNIFICANT CHANGE UP (ref 3.8–10.5)

## 2025-01-01 PROCEDURE — 93017 CV STRESS TEST TRACING ONLY: CPT

## 2025-01-01 PROCEDURE — 80053 COMPREHEN METABOLIC PANEL: CPT

## 2025-01-01 PROCEDURE — 80061 LIPID PANEL: CPT

## 2025-01-01 PROCEDURE — 36415 COLL VENOUS BLD VENIPUNCTURE: CPT

## 2025-01-01 PROCEDURE — 80048 BASIC METABOLIC PNL TOTAL CA: CPT

## 2025-01-01 PROCEDURE — A9500: CPT

## 2025-01-01 PROCEDURE — 97161 PT EVAL LOW COMPLEX 20 MIN: CPT | Mod: GP

## 2025-01-01 PROCEDURE — 84484 ASSAY OF TROPONIN QUANT: CPT

## 2025-01-01 PROCEDURE — 72141 MRI NECK SPINE W/O DYE: CPT | Mod: MC

## 2025-01-01 PROCEDURE — 99285 EMERGENCY DEPT VISIT HI MDM: CPT

## 2025-01-01 PROCEDURE — 82962 GLUCOSE BLOOD TEST: CPT

## 2025-01-01 PROCEDURE — 70450 CT HEAD/BRAIN W/O DYE: CPT | Mod: MC

## 2025-01-01 PROCEDURE — 99223 1ST HOSP IP/OBS HIGH 75: CPT

## 2025-01-01 PROCEDURE — 93970 EXTREMITY STUDY: CPT | Mod: 26

## 2025-01-01 PROCEDURE — 93005 ELECTROCARDIOGRAM TRACING: CPT

## 2025-01-01 PROCEDURE — 78452 HT MUSCLE IMAGE SPECT MULT: CPT

## 2025-01-01 PROCEDURE — 93010 ELECTROCARDIOGRAM REPORT: CPT

## 2025-01-01 PROCEDURE — 85379 FIBRIN DEGRADATION QUANT: CPT

## 2025-01-01 PROCEDURE — 97166 OT EVAL MOD COMPLEX 45 MIN: CPT | Mod: GO

## 2025-01-01 PROCEDURE — 70551 MRI BRAIN STEM W/O DYE: CPT | Mod: MC

## 2025-01-01 PROCEDURE — 83036 HEMOGLOBIN GLYCOSYLATED A1C: CPT

## 2025-01-01 PROCEDURE — 70450 CT HEAD/BRAIN W/O DYE: CPT | Mod: 26

## 2025-01-01 PROCEDURE — 85025 COMPLETE CBC W/AUTO DIFF WBC: CPT

## 2025-01-01 PROCEDURE — 93970 EXTREMITY STUDY: CPT

## 2025-01-01 PROCEDURE — 93306 TTE W/DOPPLER COMPLETE: CPT

## 2025-01-01 RX ORDER — LISINOPRIL 30 MG/1
10 TABLET ORAL DAILY
Refills: 0 | Status: DISCONTINUED | OUTPATIENT
Start: 2025-01-01 | End: 2025-01-03

## 2025-01-01 RX ORDER — SODIUM CHLORIDE 9 MG/ML
1000 INJECTION, SOLUTION INTRAVENOUS
Refills: 0 | Status: DISCONTINUED | OUTPATIENT
Start: 2025-01-01 | End: 2025-01-03

## 2025-01-01 RX ORDER — GLUCAGON INJECTION, SOLUTION 0.5 MG/.1ML
1 INJECTION, SOLUTION SUBCUTANEOUS ONCE
Refills: 0 | Status: DISCONTINUED | OUTPATIENT
Start: 2025-01-01 | End: 2025-01-03

## 2025-01-01 RX ORDER — INSULIN LISPRO 100/ML
VIAL (ML) SUBCUTANEOUS
Refills: 0 | Status: DISCONTINUED | OUTPATIENT
Start: 2025-01-01 | End: 2025-01-02

## 2025-01-01 RX ORDER — ORAL SEMAGLUTIDE 3 MG/1
1 TABLET ORAL
Refills: 0 | DISCHARGE

## 2025-01-01 RX ORDER — DEXTROSE MONOHYDRATE 25 G/50ML
25 INJECTION, SOLUTION INTRAVENOUS ONCE
Refills: 0 | Status: DISCONTINUED | OUTPATIENT
Start: 2025-01-01 | End: 2025-01-03

## 2025-01-01 RX ORDER — INSULIN GLARGINE-YFGN 100 [IU]/ML
20 INJECTION, SOLUTION SUBCUTANEOUS
Refills: 0 | DISCHARGE

## 2025-01-01 RX ORDER — DEXTROSE MONOHYDRATE 25 G/50ML
15 INJECTION, SOLUTION INTRAVENOUS ONCE
Refills: 0 | Status: DISCONTINUED | OUTPATIENT
Start: 2025-01-01 | End: 2025-01-03

## 2025-01-01 RX ORDER — INSULIN LISPRO 100/ML
0 VIAL (ML) SUBCUTANEOUS
Refills: 0 | DISCHARGE

## 2025-01-01 RX ORDER — INSULIN LISPRO 100/ML
VIAL (ML) SUBCUTANEOUS AT BEDTIME
Refills: 0 | Status: DISCONTINUED | OUTPATIENT
Start: 2025-01-01 | End: 2025-01-02

## 2025-01-01 RX ORDER — DEXTROSE MONOHYDRATE 25 G/50ML
12.5 INJECTION, SOLUTION INTRAVENOUS ONCE
Refills: 0 | Status: DISCONTINUED | OUTPATIENT
Start: 2025-01-01 | End: 2025-01-03

## 2025-01-01 RX ORDER — INSULIN LISPRO 100/ML
6 VIAL (ML) SUBCUTANEOUS ONCE
Refills: 0 | Status: DISCONTINUED | OUTPATIENT
Start: 2025-01-01 | End: 2025-01-01

## 2025-01-01 RX ORDER — INSULIN GLARGINE-YFGN 100 [IU]/ML
20 INJECTION, SOLUTION SUBCUTANEOUS EVERY MORNING
Refills: 0 | Status: DISCONTINUED | OUTPATIENT
Start: 2025-01-01 | End: 2025-01-02

## 2025-01-01 RX ORDER — ACETAMINOPHEN 80 MG/.8ML
650 SOLUTION/ DROPS ORAL EVERY 6 HOURS
Refills: 0 | Status: DISCONTINUED | OUTPATIENT
Start: 2025-01-01 | End: 2025-01-03

## 2025-01-01 RX ORDER — ATORVASTATIN CALCIUM 40 MG/1
40 TABLET, FILM COATED ORAL AT BEDTIME
Refills: 0 | Status: DISCONTINUED | OUTPATIENT
Start: 2025-01-01 | End: 2025-01-03

## 2025-01-01 RX ORDER — NITROGLYCERIN 20.8 MG/1
0.4 FILM, EXTENDED RELEASE TRANSDERMAL
Refills: 0 | Status: DISCONTINUED | OUTPATIENT
Start: 2025-01-01 | End: 2025-01-03

## 2025-01-01 RX ORDER — ASPIRIN 81 MG
325 TABLET, DELAYED RELEASE (ENTERIC COATED) ORAL DAILY
Refills: 0 | Status: DISCONTINUED | OUTPATIENT
Start: 2025-01-02 | End: 2025-01-02

## 2025-01-01 RX ORDER — ACETAMINOPHEN 80 MG/.8ML
2 SOLUTION/ DROPS ORAL
Refills: 0 | DISCHARGE

## 2025-01-01 RX ORDER — ASPIRIN 81 MG
243 TABLET, DELAYED RELEASE (ENTERIC COATED) ORAL ONCE
Refills: 0 | Status: COMPLETED | OUTPATIENT
Start: 2025-01-01 | End: 2025-01-01

## 2025-01-01 RX ORDER — INSULIN LISPRO 100/ML
6 VIAL (ML) SUBCUTANEOUS ONCE
Refills: 0 | Status: COMPLETED | OUTPATIENT
Start: 2025-01-01 | End: 2025-01-01

## 2025-01-01 RX ADMIN — Medication 1: at 21:31

## 2025-01-01 RX ADMIN — ATORVASTATIN CALCIUM 40 MILLIGRAM(S): 40 TABLET, FILM COATED ORAL at 21:30

## 2025-01-01 RX ADMIN — LISINOPRIL 10 MILLIGRAM(S): 30 TABLET ORAL at 12:45

## 2025-01-01 RX ADMIN — Medication 2: at 16:54

## 2025-01-01 RX ADMIN — Medication 6 UNIT(S): at 13:30

## 2025-01-01 RX ADMIN — Medication 243 MILLIGRAM(S): at 07:59

## 2025-01-01 NOTE — ED PROVIDER NOTE - OBJECTIVE STATEMENT
64-year-old female PMH HTN, HLD, DM, TIA, presents to the emergency department for chest pain.  Patient was seen in the ED yesterday after onset of chest pain 3 PM after stressful incident at work.  Labs performed, patient found to have elevated troponin of 219.  Was given aspirin and nitroglycerin at that time with some improvement in symptoms.  It was recommended she be admitted for further ACS workup but due to personal issues patient left AMA.  Patient now presents with persistence of the pain.  States its migrated slightly superiorly.  Nonexertional, nonpleuritic.  Does not follow with a cardiologist.
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
## 2025-01-01 NOTE — ED PROVIDER NOTE - BIRTH SEX
Individual Follow-Up Form 4    9/25/2018    Quit Date: 10/15/18    Clinical Status of Patient: Outpatient    Length of Service: 45 minutes    Continuing Medication: yes  Chantix    Other Medications: none     Target Symptoms: Withdrawal and medication side effects. The following were  rated moderate (3) to severe (4) on TCRS:  · Moderate (3): increased appetite, difficulty concentrationg, insomnia, restless, nausea, dry mouth, urinate more often, nicotine replacement therapy, habit  · Severe (4): back pain, constipation nicotine replacement therapy, habit    Comments: The patient is smoking 1 cigarette/day for  The rest of the week using the 1 mg Chantix BID. Her goal is to do dry runs next week. She is very excited about her progress and was praised for her efforts. The CO measurement = 25  Ppm possibly due to heavily smoked up car. The patient denies any abnormal behavioral or mental changes at this time.The patient will continue individual sessions and medication monitoring by CTTS. Prescribed medication management will be by practitoner.completion of TCRS (Tobacco Cessation Rating Scale) reviewed strategies, habitual behavior, stress, and high risk situations. Introduced stress with addition interventions, SOLVE, relaxation with interventions, nutrition, exercise, weight gain, and the importance of rewarding oneself for accomplishments toward becoming tobacco free. Open discussion of all items with interventions.           Diagnosis: F17.210    Next Visit: 2 weeks   Female

## 2025-01-01 NOTE — ED ADULT NURSE NOTE - NSFALLUNIVINTERV_ED_ALL_ED
Bed/Stretcher in lowest position, wheels locked, appropriate side rails in place/Call bell, personal items and telephone in reach/Instruct patient to call for assistance before getting out of bed/chair/stretcher/Non-slip footwear applied when patient is off stretcher/[place] to call system/Physically safe environment - no spills, clutter or unnecessary equipment/Purposeful proactive rounding/Room/bathroom lighting operational, light cord in reach

## 2025-01-01 NOTE — ED PROVIDER NOTE - PROGRESS NOTE DETAILS
All labs personally reviewed.  Patient still has elevated troponin 85, down trended since yesterday.  Patient reexamined, no active chest pain at this time.  Given moderate heart score, would admit for further cardiac eval.  Discussed this plan with patient and all questions answered.  Patient given a copy of her lab results at her request.  Cardiology consult order placed.  I, Shaji Andujar, personally discussed this patient's care with attending hospitalist Dr. Byrd who recommends admit to telemetry.

## 2025-01-01 NOTE — ED PROVIDER NOTE - NSICDXPASTMEDICALHX_GEN_ALL_CORE_FT
PAST MEDICAL HISTORY:  DM (diabetes mellitus)     HLD (hyperlipidemia)     HTN (hypertension)     TIA (transient ischemic attack)

## 2025-01-01 NOTE — ED ADULT TRIAGE NOTE - CHIEF COMPLAINT QUOTE
Pt. ambulatory into ED c/o pt ambulatory to er c/o 10/10 left sided chest pain x2 hours with left sided arm pain. endorsing shortness of breath. denies nausea/vomiting/fevers. Pt. left AMA yesterday from [facility] because she "had to get stuff done at home". + troponin upon yesterday's lab work. sent for EKG. Pt. ambulatory into ED c/o left sided chest pain x2 days with left sided arm pain. endorsing shortness of breath. denies nausea/vomiting/fevers. Pt. left AMA yesterday from ED because she "had to get stuff done at home". + troponin upon yesterday's lab work. sent for EKG.

## 2025-01-01 NOTE — ED ADULT NURSE REASSESSMENT NOTE - NS ED NURSE REASSESS COMMENT FT1
Dr Rios contacted for pt requesting nitro for chest pain and blood sugar of 405. Telephone order taken for 6u of subq lispro. Pt states she did not take her lantus last night,

## 2025-01-01 NOTE — ED PROVIDER NOTE - TOBACCO USE
"Anesthesia Transfer of Care Note    Patient: Candy Huynh    Procedure(s) Performed: Procedure(s) (LRB):  ARTHROPLASTY, KNEE, TOTAL RIGHT: BALDO - NEXGEN (Right)    Patient location: PACU    Anesthesia Type: spinal    Transport from OR: Transported from OR on 6-10 L/min O2 by face mask with adequate spontaneous ventilation    Post pain: adequate analgesia    Post assessment: no apparent anesthetic complications    Post vital signs: stable    Level of consciousness: sedated    Nausea/Vomiting: no nausea/vomiting    Complications: none    Transfer of care protocol was followed      Last vitals:   Visit Vitals  /74 (BP Location: Left arm, Patient Position: Lying)   Pulse (!) 59   Temp 36.3 °C (97.3 °F) (Tympanic)   Resp 18   Ht 5' 3" (1.6 m)   Wt 112.5 kg (248 lb)   SpO2 100%   Breastfeeding No   BMI 43.93 kg/m²     "
Unknown if ever smoked

## 2025-01-01 NOTE — CONSULT NOTE ADULT - SUBJECTIVE AND OBJECTIVE BOX
PCP:    REQUESTING PHYSICIAN:    REASON FOR CONSULT:    CHIEF COMPLAINT:    HPI:  63 y/o F w/ PMH of HTN, dyslipidemia, DM, TIA, p/w CP. Patient developed CP acutely yesterday after a stressful event at her job. States that CP is L sided, under L breast and radiates to upper L chest area and is pinching in quality. Patient had come to ED initially, and was found to have troponin in 200s. However, patient had left against AMA. States she went home and went to sleep. When she woke up she still had CP lightly, so she came back to ED. States she had some SOB when walking from car to ED, but denies any SOB at this time. Denies nausea, vomiting, fever, chills, recent travel. Patient has some mild LE edema, LLE > RLE.   Cardiology called to evaluate symptoms.     PSH: Ankle surgery, L and R toe amputation     Social Hx: Denies tobacco / drugs, etoh - 1 beer occasionally every 3 months      Family Hx: Father - DM, Mother - DM / HTN, sibiling - ovarian cancer, +Sister who had MI at 59 y/o  (01 Jan 2025 12:23)      PAST MEDICAL & SURGICAL HISTORY:  DM (diabetes mellitus)      HTN (hypertension)      HLD (hyperlipidemia)      TIA (transient ischemic attack)      Toe amputation status, left      H/O ankle fusion          Allergies    sulfADIAZINE (Hives)  sulfa drugs (Hives; Pruritus)    Intolerances        SOCIAL HISTORY:    FAMILY HISTORY:  FH: ovarian cancer (Sibling)    FHx: diabetes mellitus (Father)        MEDICATIONS:  MEDICATIONS  (STANDING):  atorvastatin 40 milliGRAM(s) Oral at bedtime  dextrose 5%. 1000 milliLiter(s) (100 mL/Hr) IV Continuous <Continuous>  dextrose 5%. 1000 milliLiter(s) (50 mL/Hr) IV Continuous <Continuous>  dextrose 50% Injectable 25 Gram(s) IV Push once  dextrose 50% Injectable 12.5 Gram(s) IV Push once  dextrose 50% Injectable 25 Gram(s) IV Push once  glucagon  Injectable 1 milliGRAM(s) IntraMuscular once  insulin glargine Injectable (LANTUS) 20 Unit(s) SubCutaneous every morning  insulin lispro (ADMELOG) corrective regimen sliding scale   SubCutaneous three times a day before meals  insulin lispro (ADMELOG) corrective regimen sliding scale   SubCutaneous at bedtime  lisinopril 10 milliGRAM(s) Oral daily    MEDICATIONS  (PRN):  acetaminophen     Tablet .. 650 milliGRAM(s) Oral every 6 hours PRN Mild Pain (1 - 3)  dextrose Oral Gel 15 Gram(s) Oral once PRN Blood Glucose LESS THAN 70 milliGRAM(s)/deciliter  nitroglycerin     SubLingual 0.4 milliGRAM(s) SubLingual every 5 minutes PRN Chest Pain      REVIEW OF SYSTEMS:    CONSTITUTIONAL: No weakness, fevers or chills  EYES/ENT: No visual changes;  No vertigo or throat pain   NECK: No pain or stiffness  RESPIRATORY: No cough, wheezing, hemoptysis; No shortness of breath  CARDIOVASCULAR: Chest pain  GASTROINTESTINAL: No abdominal or epigastric pain. No nausea, vomiting, or hematemesis; No diarrhea or constipation. No melena or hematochezia.  GENITOURINARY: No dysuria, frequency or hematuria  NEUROLOGICAL: No numbness or weakness  SKIN: No itching, burning, rashes, or lesions   All other review of systems is negative unless indicated above    Vital Signs Last 24 Hrs  T(C): 36.7 (01 Jan 2025 12:45), Max: 37.1 (01 Jan 2025 07:06)  T(F): 98 (01 Jan 2025 12:45), Max: 98.8 (01 Jan 2025 07:06)  HR: 81 (01 Jan 2025 13:30) (73 - 89)  BP: 148/68 (01 Jan 2025 13:30) (144/74 - 158/81)  BP(mean): 92 (01 Jan 2025 12:45) (92 - 119)  RR: 18 (01 Jan 2025 13:30) (18 - 20)  SpO2: 96% (01 Jan 2025 13:30) (95% - 100%)    Parameters below as of 01 Jan 2025 13:30  Patient On (Oxygen Delivery Method): room air        I&O's Summary      PHYSICAL EXAM:    Constitutional: NAD, awake and alert, well-developed  HEENT: PERR, EOMI,  No oral cyananosis.  Neck:  supple,  No JVD  Respiratory: Breath sounds are clear bilaterally, No wheezing, rales or rhonchi  Cardiovascular: S1 and S2, regular rate and rhythm, no Murmurs, gallops or rubs  Gastrointestinal: Bowel Sounds present, soft, nontender.   Extremities: No peripheral edema. No clubbing or cyanosis.  Vascular: 2+ peripheral pulses  Neurological: A/O x 3, no focal deficits  Musculoskeletal: no calf tenderness.  Skin: No rashes.      LABS: All Labs Reviewed:                        11.5   4.71  )-----------( 321      ( 01 Jan 2025 07:44 )             36.3                         12.1   5.66  )-----------( 298      ( 31 Dec 2024 18:59 )             38.9     01 Jan 2025 07:44    133    |  103    |  14     ----------------------------<  325    4.3     |  25     |  0.87   31 Dec 2024 18:59    136    |  105    |  18     ----------------------------<  255    4.3     |  28     |  0.82     Ca    8.9        01 Jan 2025 07:44  Ca    9.5        31 Dec 2024 18:59  Mg     2.4       01 Jan 2025 07:44    TPro  7.5    /  Alb  3.3    /  TBili  0.3    /  DBili  x      /  AST  14     /  ALT  22     /  AlkPhos  113    01 Jan 2025 07:44  TPro  7.7    /  Alb  3.6    /  TBili  0.3    /  DBili  x      /  AST  13     /  ALT  30     /  AlkPhos  114    31 Dec 2024 18:59    PT/INR - ( 01 Jan 2025 07:44 )   PT: 10.2 sec;   INR: 0.86 ratio         PTT - ( 01 Jan 2025 07:44 )  PTT:26.1 sec      Blood Culture:         RADIOLOGY/EKG: NSR no acute changes

## 2025-01-01 NOTE — H&P ADULT - ASSESSMENT
63 y/o F w/ PMH of HTN, dyslipidemia, DM, TIA, p/w CP    *CP - R/o ACS  -ASA  -Patient's troponin trending down  -Cardio consult  -Tele monitoring   -EK sinus, nonspecific ST abnormality  -Echo  -Check D-dimer   -CXR reports clear lungs     *L hand weakness since November w/ h/o TIA  -ASA / Statin   -CTH  -Neuro consult   -Denies any acute neurological symptoms at this time, and does not recall what events led to being diagnosed with TIA previously.     *DM  -Humalog ISS + Lantus   -Diabetic diet     *LLE swelling > RLE  -U/S of LEs     *HTN / dyslipidemia   -C/w home meds and f/u outpatient for further management if conditions remain stable during hospitalization     *DVT ppx  -SCDs     >75 mins required for admission    65 y/o F w/ PMH of HTN, dyslipidemia, DM, TIA, p/w CP    *CP - R/o ACS  -ASA  -Patient's troponin trending down  -Cardio consult  -Tele monitoring   -EK sinus, nonspecific ST abnormality  -Echo  -Check D-dimer   -CXR reports clear lungs     *L hand weakness since November w/ h/o TIA  -ASA / Statin   -CTH  -Neuro consult   -Denies any acute neurological symptoms at this time, and does not recall what events led to being diagnosed with TIA previously.     *DM  -Humalog ISS + Lantus   -Diabetic diet     *LLE swelling > RLE  -U/S of LEs     *HTN / dyslipidemia   -C/w home meds and f/u outpatient for further management if conditions remain stable during hospitalization.    *DVT ppx  -SCDs     >75 mins required for admission    63 y/o F w/ PMH of HTN, dyslipidemia, DM, TIA, p/w CP    *CP - R/o ACS  -ASA  -Patient's troponin trending down  -Cardio consult  -Tele monitoring   -EK sinus, nonspecific ST abnormality  -Echo.  -Check D-dimer   -CXR reports clear lungs     *L hand weakness since November w/ h/o TIA  -ASA / Statin   -CTH  -Neuro consult   -Denies any acute neurological symptoms at this time, and does not recall what events led to being diagnosed with TIA previously.     *DM  -Humalog ISS + Lantus   -Diabetic diet     *LLE swelling > RLE  -U/S of LEs     *HTN / dyslipidemia   -C/w home meds and f/u outpatient for further management if conditions remain stable during hospitalization     *DVT ppx  -SCDs     >75 mins required for admission

## 2025-01-01 NOTE — PATIENT PROFILE ADULT - FALL HARM RISK - ATTEMPT OOB
Called the patient and left a voicemail. Refill was sent on 3/22/2024 at [facility] The patient is requesting  a pen needle through [facility]. Please verify which pharmacy she wants us to send.   No

## 2025-01-01 NOTE — ED PROVIDER NOTE - PHYSICAL EXAMINATION
GENERAL: A&Ox4, non-toxic appearing, no acute distress  HEENT: NCAT, EOMI, oral mucosa moist, normal conjunctiva  RESP: CTAB, no respiratory distress, no wheezes/rhonchi/rales, speaking in full sentences  CV: RRR, no murmurs/rubs/gallops, no chest wall tenderness, no calf tenderness  ABDOMEN: soft, non-tender, non-distended, no guarding, no rebound tenderness  MSK: no visible deformities  NEURO: no focal sensory or motor deficits, CN 2-12 grossly intact  SKIN: warm, normal color, well perfused, no rash  PSYCH: normal affect

## 2025-01-01 NOTE — H&P ADULT - HISTORY OF PRESENT ILLNESS
65 y/o F w/ PMH of HTN, dyslipidemia, DM, TIA, p/w CP. Patient developed CP acutely yesterday after a stressful event at her job. States that CP is L sided, under L breast and radiates to upper L chest area and is pinching in quality. Patient had come to ED initially, and was found to have troponin in 200s. However, patient had left against AMA. States she went home and went to sleep. When she woke up she still had CP lightly, so she came back to ED. States she had some SOB when walking from car to ED, but denies any SOB at this time. Denies nausea, vomiting, fever, chills, recent travel. Patient has some mild LE edema, LLE > RLE.     Patient also h/o L hand decreased strength since she started a job in November. States that she has trouble grasping things sometimes. Denies any acute neurological symptoms.         PSH: Ankle surgery, L and R toe amputation     Social Hx: Denies tobacco / drugs, etoh - 1 beer occasionally every 3 months      Family Hx: Father - DM, Mother - DM / HTN, sibiling - ovarian cancer, +Sister who had MI at 59 y/o

## 2025-01-01 NOTE — ED PROVIDER NOTE - CLINICAL SUMMARY MEDICAL DECISION MAKING FREE TEXT BOX
64-year-old female presenting with 1 day of left-sided chest pain, nonpleuritic and nonexertional.  Had elevated troponin yesterday but left AMA.  Recent emotional events including anniversary of son's death and stress at work may have precipitated.  Differential including but not limited to: ACS, Takotsubo cardiomyopathy, doubt PE, aortic dissection or aneurysm, other.  Will evaluate with blood work, aspirin load, anticipate admission.

## 2025-01-01 NOTE — PATIENT PROFILE ADULT - NSPROPTRIGHTBILLOFRIGHTS_GEN_A_NUR
I spoke with patient she is still having major complaints of urinary issues. She has continuous leakage, last night was not a bad. She travels in a few weeks to [place] and would like to get this issue under control. She is not sure if she should see urology?    patient

## 2025-01-01 NOTE — CONSULT NOTE ADULT - PROBLEM SELECTOR RECOMMENDATION 9
Intermittent symptoms with a mild increase 2 days ago. Now symptoms are persistent. Echo to evaluate and NST in the am. Monitor for 24 hours and serial troponin. ASA 81 mg,

## 2025-01-01 NOTE — ED ADULT NURSE NOTE - CHIEF COMPLAINT QUOTE
Pt. ambulatory into ED c/o left sided chest pain x2 days with left sided arm pain. endorsing shortness of breath. denies nausea/vomiting/fevers. Pt. left AMA yesterday from ED because she "had to get stuff done at home". + troponin upon yesterday's lab work. sent for EKG.

## 2025-01-02 ENCOUNTER — RESULT REVIEW (OUTPATIENT)
Age: 65
End: 2025-01-02

## 2025-01-02 DIAGNOSIS — R20.0 ANESTHESIA OF SKIN: ICD-10-CM

## 2025-01-02 LAB
A1C WITH ESTIMATED AVERAGE GLUCOSE RESULT: 12.4 % — HIGH (ref 4–5.6)
ALBUMIN SERPL ELPH-MCNC: 3.1 G/DL — LOW (ref 3.3–5)
ALP SERPL-CCNC: 101 U/L — SIGNIFICANT CHANGE UP (ref 40–120)
ALT FLD-CCNC: 19 U/L — SIGNIFICANT CHANGE UP (ref 12–78)
ANION GAP SERPL CALC-SCNC: 3 MMOL/L — LOW (ref 5–17)
AST SERPL-CCNC: 11 U/L — LOW (ref 15–37)
BASOPHILS # BLD AUTO: 0.03 K/UL — SIGNIFICANT CHANGE UP (ref 0–0.2)
BASOPHILS NFR BLD AUTO: 0.5 % — SIGNIFICANT CHANGE UP (ref 0–2)
BILIRUB SERPL-MCNC: 0.3 MG/DL — SIGNIFICANT CHANGE UP (ref 0.2–1.2)
BUN SERPL-MCNC: 27 MG/DL — HIGH (ref 7–23)
CALCIUM SERPL-MCNC: 9.3 MG/DL — SIGNIFICANT CHANGE UP (ref 8.5–10.1)
CHLORIDE SERPL-SCNC: 107 MMOL/L — SIGNIFICANT CHANGE UP (ref 96–108)
CHOLEST SERPL-MCNC: 228 MG/DL — HIGH
CO2 SERPL-SCNC: 27 MMOL/L — SIGNIFICANT CHANGE UP (ref 22–31)
CREAT SERPL-MCNC: 0.88 MG/DL — SIGNIFICANT CHANGE UP (ref 0.5–1.3)
EGFR: 74 ML/MIN/1.73M2 — SIGNIFICANT CHANGE UP
EOSINOPHIL # BLD AUTO: 0.08 K/UL — SIGNIFICANT CHANGE UP (ref 0–0.5)
EOSINOPHIL NFR BLD AUTO: 1.4 % — SIGNIFICANT CHANGE UP (ref 0–6)
ESTIMATED AVERAGE GLUCOSE: 309 MG/DL — HIGH (ref 68–114)
GLUCOSE BLDC GLUCOMTR-MCNC: 196 MG/DL — HIGH (ref 70–99)
GLUCOSE BLDC GLUCOMTR-MCNC: 320 MG/DL — HIGH (ref 70–99)
GLUCOSE BLDC GLUCOMTR-MCNC: 329 MG/DL — HIGH (ref 70–99)
GLUCOSE BLDC GLUCOMTR-MCNC: 346 MG/DL — HIGH (ref 70–99)
GLUCOSE SERPL-MCNC: 277 MG/DL — HIGH (ref 70–99)
HCT VFR BLD CALC: 35.7 % — SIGNIFICANT CHANGE UP (ref 34.5–45)
HDLC SERPL-MCNC: 49 MG/DL — LOW
HGB BLD-MCNC: 11.1 G/DL — LOW (ref 11.5–15.5)
IMM GRANULOCYTES NFR BLD AUTO: 0.4 % — SIGNIFICANT CHANGE UP (ref 0–0.9)
LIPID PNL WITH DIRECT LDL SERPL: 146 MG/DL — HIGH
LYMPHOCYTES # BLD AUTO: 2.52 K/UL — SIGNIFICANT CHANGE UP (ref 1–3.3)
LYMPHOCYTES # BLD AUTO: 44.4 % — HIGH (ref 13–44)
MCHC RBC-ENTMCNC: 25.5 PG — LOW (ref 27–34)
MCHC RBC-ENTMCNC: 31.1 G/DL — LOW (ref 32–36)
MCV RBC AUTO: 81.9 FL — SIGNIFICANT CHANGE UP (ref 80–100)
MONOCYTES # BLD AUTO: 0.78 K/UL — SIGNIFICANT CHANGE UP (ref 0–0.9)
MONOCYTES NFR BLD AUTO: 13.8 % — SIGNIFICANT CHANGE UP (ref 2–14)
NEUTROPHILS # BLD AUTO: 2.24 K/UL — SIGNIFICANT CHANGE UP (ref 1.8–7.4)
NEUTROPHILS NFR BLD AUTO: 39.5 % — LOW (ref 43–77)
NON HDL CHOLESTEROL: 179 MG/DL — HIGH
PLATELET # BLD AUTO: 286 K/UL — SIGNIFICANT CHANGE UP (ref 150–400)
POTASSIUM SERPL-MCNC: 4.4 MMOL/L — SIGNIFICANT CHANGE UP (ref 3.5–5.3)
POTASSIUM SERPL-SCNC: 4.4 MMOL/L — SIGNIFICANT CHANGE UP (ref 3.5–5.3)
PROT SERPL-MCNC: 6.9 GM/DL — SIGNIFICANT CHANGE UP (ref 6–8.3)
RBC # BLD: 4.36 M/UL — SIGNIFICANT CHANGE UP (ref 3.8–5.2)
RBC # FLD: 14.8 % — HIGH (ref 10.3–14.5)
SODIUM SERPL-SCNC: 137 MMOL/L — SIGNIFICANT CHANGE UP (ref 135–145)
TRIGL SERPL-MCNC: 181 MG/DL — HIGH
WBC # BLD: 5.67 K/UL — SIGNIFICANT CHANGE UP (ref 3.8–10.5)
WBC # FLD AUTO: 5.67 K/UL — SIGNIFICANT CHANGE UP (ref 3.8–10.5)

## 2025-01-02 PROCEDURE — 93010 ELECTROCARDIOGRAM REPORT: CPT

## 2025-01-02 PROCEDURE — 99233 SBSQ HOSP IP/OBS HIGH 50: CPT

## 2025-01-02 PROCEDURE — 99223 1ST HOSP IP/OBS HIGH 75: CPT

## 2025-01-02 PROCEDURE — 93306 TTE W/DOPPLER COMPLETE: CPT | Mod: 26

## 2025-01-02 PROCEDURE — 72141 MRI NECK SPINE W/O DYE: CPT | Mod: 26

## 2025-01-02 PROCEDURE — 70551 MRI BRAIN STEM W/O DYE: CPT | Mod: 26

## 2025-01-02 PROCEDURE — 99232 SBSQ HOSP IP/OBS MODERATE 35: CPT

## 2025-01-02 RX ORDER — INSULIN LISPRO 100/ML
4 VIAL (ML) SUBCUTANEOUS
Refills: 0 | Status: DISCONTINUED | OUTPATIENT
Start: 2025-01-02 | End: 2025-01-03

## 2025-01-02 RX ORDER — ENOXAPARIN SODIUM 60 MG/.6ML
40 INJECTION INTRAVENOUS; SUBCUTANEOUS EVERY 24 HOURS
Refills: 0 | Status: DISCONTINUED | OUTPATIENT
Start: 2025-01-02 | End: 2025-01-03

## 2025-01-02 RX ORDER — INSULIN LISPRO 100/ML
VIAL (ML) SUBCUTANEOUS
Refills: 0 | Status: DISCONTINUED | OUTPATIENT
Start: 2025-01-02 | End: 2025-01-03

## 2025-01-02 RX ORDER — ASPIRIN 81 MG
81 TABLET, DELAYED RELEASE (ENTERIC COATED) ORAL DAILY
Refills: 0 | Status: DISCONTINUED | OUTPATIENT
Start: 2025-01-02 | End: 2025-01-03

## 2025-01-02 RX ORDER — INSULIN LISPRO 100/ML
VIAL (ML) SUBCUTANEOUS AT BEDTIME
Refills: 0 | Status: DISCONTINUED | OUTPATIENT
Start: 2025-01-02 | End: 2025-01-03

## 2025-01-02 RX ORDER — INSULIN GLARGINE-YFGN 100 [IU]/ML
20 INJECTION, SOLUTION SUBCUTANEOUS AT BEDTIME
Refills: 0 | Status: DISCONTINUED | OUTPATIENT
Start: 2025-01-03 | End: 2025-01-03

## 2025-01-02 RX ADMIN — Medication 4 UNIT(S): at 17:59

## 2025-01-02 RX ADMIN — LISINOPRIL 10 MILLIGRAM(S): 30 TABLET ORAL at 08:17

## 2025-01-02 RX ADMIN — Medication 4 UNIT(S): at 14:03

## 2025-01-02 RX ADMIN — INSULIN GLARGINE-YFGN 20 UNIT(S): 100 INJECTION, SOLUTION SUBCUTANEOUS at 08:17

## 2025-01-02 RX ADMIN — Medication 8: at 14:02

## 2025-01-02 RX ADMIN — Medication 4: at 21:06

## 2025-01-02 RX ADMIN — ENOXAPARIN SODIUM 40 MILLIGRAM(S): 60 INJECTION INTRAVENOUS; SUBCUTANEOUS at 21:08

## 2025-01-02 RX ADMIN — ATORVASTATIN CALCIUM 40 MILLIGRAM(S): 40 TABLET, FILM COATED ORAL at 21:08

## 2025-01-02 RX ADMIN — Medication 4: at 08:17

## 2025-01-02 RX ADMIN — Medication 81 MILLIGRAM(S): at 08:22

## 2025-01-02 NOTE — PHYSICAL THERAPY INITIAL EVALUATION ADULT - PERTINENT HX OF CURRENT PROBLEM, REHAB EVAL
63 y/o F w/ PMH of HTN, dyslipidemia, DM, TIA, congetal ptosis b/l, here for cardiac w/u 2/2 CP. Patient developed CP acutely  after a stressful event at her job.  Patient also h/o L hand decreased strength and paresthesia since she started a job in November. States that she has trouble grasping things sometimes. CT head was neg for any acute findings 63 y/o F w/ PMH of HTN, dyslipidemia, DM, TIA, congenital ptosis b/l, here for cardiac w/u 2/2 CP. Patient developed CP acutely  after a stressful event at her job.  Patient also h/o L hand decreased strength and paresthesia since she started a job in November. States that she has trouble grasping things sometimes. CT head was neg for any acute findings

## 2025-01-02 NOTE — PHYSICAL THERAPY INITIAL EVALUATION ADULT - GENERAL OBSERVATIONS, REHAB EVAL
[facility] Hospitalist Progress Note    Date of Service: 2018    Chief Complaint  77 y.o. male admitted 2018 with  AMS and pain in multiple spots including left arm and leg as well as buttock pain.  Patient went to bed at 9:30 PM last night with no pain but symptoms were present immediately after waking up this morning at 8:30 AM. With his pain, he reports left leg and left arm weakness. He confirms a minor headache at this time as well. Patient was discharged from the hospital one week ago following treatment for cellulitis and has been compliant with all of his discharge medication.  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  Now he has left arm, chest, buttock cellulitis.    Interval Problem Update  Patient condition improving. Now more awake and alert but still has episodes of confusion. Continues to be on vanc and Unasyn as well as IV fluids. No acute episodes overnight per nursing staff.    Consultants/Specialty  None    Disposition  PENDING        Review of Systems   Unable to perform ROS: Mental acuity      Physical Exam  Laboratory/Imaging   Hemodynamics  Temp (24hrs), Av.7 °C (98.1 °F), Min:36.6 °C (97.8 °F), Max:36.9 °C (98.4 °F)   Temperature: 36.7 °C (98 °F)  Pulse  Av.2  Min: 78  Max: 104   Blood Pressure : 143/65      Respiratory      Respiration: 18, Pulse Oximetry: 93 %     Work Of Breathing / Effort: Mild  RUL Breath Sounds: Diminished, RML Breath Sounds: Diminished, RLL Breath Sounds: Diminished, LAW Breath Sounds: Diminished, LLL Breath Sounds: Diminished    Fluids    Intake/Output Summary (Last 24 hours) at 18 0433  Last data filed at 18   Gross per 24 hour   Intake             2414 ml   Output              500 ml   Net             1914 ml       Nutrition  Orders Placed This Encounter   Procedures   • Diet Order Cardiac     Standing  Status:   Standing     Number of Occurrences:   1     Order Specific Question:   Diet:     Answer:   Cardiac [6]     Physical Exam   Constitutional: No distress.   HENT:   Head: Normocephalic and atraumatic.   Eyes: Pupils are equal, round, and reactive to light.   Neck: Normal range of motion. Neck supple.   Cardiovascular: Normal rate, regular rhythm and normal heart sounds.    Pulmonary/Chest: Effort normal and breath sounds normal.   Abdominal: Soft. Bowel sounds are normal.   Musculoskeletal: He exhibits edema.   (+) L BKA   Neurological: He is alert. He is disoriented.   Skin: He is not diaphoretic. There is erythema.   Vitals reviewed.      Recent Labs      06/30/18   1154  07/01/18   0210  07/02/18   0205   WBC  15.4*  15.4*  15.8*   RBC  5.07  4.38*  4.28*   HEMOGLOBIN  14.0  12.0*  12.0*   HEMATOCRIT  42.5  36.4*  35.9*   MCV  83.8  83.1  83.9   MCH  27.6  27.4  28.0   MCHC  32.9*  33.0*  33.4*   RDW  47.0  47.3  47.8   PLATELETCT  308  268  245   MPV  8.7*  8.8*  9.0     Recent Labs      06/30/18   1154  07/01/18   0210  07/02/18   0205   SODIUM  138  137  139   POTASSIUM  3.8  4.1  3.9   CHLORIDE  103  110  109   CO2  27  22  22   GLUCOSE  106*  99  91   BUN  9  7*  8   CREATININE  1.00  1.09  0.94   CALCIUM  8.9  8.0*  8.2*     Recent Labs      06/30/18   1154  06/30/18   1417   APTT  28.4  30.5   INR  1.09  1.13     Recent Labs      06/30/18   1154   BNPBTYPENAT  86              Assessment/Plan     Sepsis (HCC)- (present on admission)   Assessment & Plan    - This is severe sepsis with the following associated acute organ dysfunction(s): metabolic/septic encephalopathy.   -Due to cellulitis  - Clinically improving though WBC count slightly elevated  - Continue IV fluids  -Patient does have chronic edema and takes Lasix at baseline, this will need to be restarted once sepsis is resolved  -Continue vancomycin and Unasyn  -Await culture results        Cellulitis- (present on admission)   Assessment & Plan     -In multiple areas  -Patient also has a wound on his right foot  -Continue vancomycin and Unasyn  -Wound care consulted        Essential hypertension- (present on admission)   Assessment & Plan    -Improving  - Continue home metoprolol  - Also continue as needed labetalol        Acute metabolic encephalopathy- (present on admission)   Assessment & Plan    -Due to sepsis  - Slowly improving  - Continue antibiotics  - Continue to advance diet as tolerated        COPD (chronic obstructive pulmonary disease) (CMS-[facility])- (present on admission)   Assessment & Plan    -No acute exacerbation  - Will consider breathing treatments          Quality-Core Measures       Pt was found sitting at eob with tele, pt is pleasant and willing to participate in PT, pt has limb length discrepancy and usually donns a corrective shoe while amb, UA now

## 2025-01-02 NOTE — PROGRESS NOTE ADULT - SUBJECTIVE AND OBJECTIVE BOX
Chief Complaint: Chest discomfort    Interval Hx: Patient seen and examined this AM, reports feeling improved as chest discomfort has eased. She had some mild dyspnea on exertion associated with the chest discomfort and she now states she no longer has shortness of breath. No complaints other than some R hand decreased strength, bit of R hand clumsiness. No other neurological symptoms.     ROS: Multi system review is comprehensively negative x 10 systems except as above    Vitals:  T(F): 98.7 (02 Jan 2025 15:14), Max: 98.7 (01 Jan 2025 23:32)  HR: 79 (02 Jan 2025 15:14) (78 - 80)  BP: 124/61 (02 Jan 2025 15:14) (120/68 - 137/76)  RR: 18 (02 Jan 2025 15:14) (18 - 18)  SpO2: 96% (02 Jan 2025 15:14) (96% - 98%) on room air    Exam:  Gen: No acute distress  HEENT: NCAT PERRL EOMI MMM clear oropharynx  Neck: Supple, no JVD, no LAD, no C spine tenderness  CVS: s1 s2 normal, RRR  Chest: Normal resp effort, lungs CTA B/L, no chest tenderness to palpation, no rash over chest  Abd: Non distended, +BS, soft, non tender  Ext: No edema, no tenderness  Skin: Warm, dry  Mood: Calm, pleasant  Neuro: AO x 3. Appropriately interactive, normal affect. Speech fluent. No aphasia or paraphasic errors. Naming /repetition intact. CN intact, chronic B/L ptosis. No pronator drift, strength 5/5 in all 4 ext except L hand  strength which is weak. Normal bulk and tone. Sens intact to light touch. Hyper-reflexic all over except absent L knee jurk, L AJ, downgoing toes b/l, neg Zuniga's. No FNFA, dysmetria, NII intact. Gait not assessed.    Labs:                        11.1   5.67  )---------( 286                   35.7       137  |  107  |  27  -----------------------<  267  4.4   |  27  |  0.88    Ca    9.3    Mg     2.4         TPro  6.9  /  Alb  3.1 /  TBili  0.3  /  DBili  x   /  AST  11  /  ALT  19  /  AlkPhos  101      PT: 10.2 sec;   INR: 0.86 ratio  PTT:26.1 sec    Imaging:  MR C spine WO 1/2: Moderate cervical degenerative disc disease includes C3-C4 focal left central and C5-C6 more broad right central disc protrusions (disc herniation) that cause  ventral cord deformity    MR brain WO 1/2: Ischemic white matter disease and atrophy typical for age. No evidence of infarction    US venous duplex B/L LE 1/1: No evidence of deep venous thrombosis in either lower extremity.    CT head WO 1/1: No evidence of acute intracranial pathology.     Cardiac Testing:  TTE 1/2: LV cavity normal in size. LV wall thickness normal. LV calculated ejection fraction of 74 % by the Osborne's biplane method of disks with an ejection fraction visually estimated at 70 to 75%. No LVH. No diastolic dysfunction. Normal RV. Normal LA. Normal RA. Interatrial septum is aneurysmal. No AS or AI. No MS or MR. Trace TR. Normal PASP estimate. The aortic root at the sinuses of Valsalva is normal in size, measuring 2.90 cm (indexed 1.50 cm/m²). No pericardial effusion seen. The inferior vena cava is normal in size measuring 1.77 cm in diameter, (normal <2.1cm) with normal inspiratory collapse (normal >50%).    EKG 1/1:  Normal sinus rhythm. Normal ECG    Meds:  MEDICATIONS  (STANDING):  aspirin enteric coated 81 milliGRAM(s) Oral daily  atorvastatin 40 milliGRAM(s) Oral at bedtime  dextrose 5%. 1000 milliLiter(s) (100 mL/Hr) IV Continuous <Continuous>  dextrose 5%. 1000 milliLiter(s) (50 mL/Hr) IV Continuous <Continuous>  dextrose 50% Injectable 25 Gram(s) IV Push once  dextrose 50% Injectable 12.5 Gram(s) IV Push once  dextrose 50% Injectable 25 Gram(s) IV Push once  enoxaparin Injectable 40 milliGRAM(s) SubCutaneous every 24 hours  glucagon  Injectable 1 milliGRAM(s) IntraMuscular once  insulin lispro (ADMELOG) corrective regimen sliding scale   SubCutaneous three times a day before meals  insulin lispro (ADMELOG) corrective regimen sliding scale   SubCutaneous at bedtime  insulin lispro Injectable (ADMELOG) 4 Unit(s) SubCutaneous three times a day before meals  lisinopril 10 milliGRAM(s) Oral daily    MEDICATIONS  (PRN):  acetaminophen     Tablet .. 650 milliGRAM(s) Oral every 6 hours PRN Mild Pain (1 - 3)  dextrose Oral Gel 15 Gram(s) Oral once PRN Blood Glucose LESS THAN 70 milliGRAM(s)/deciliter  nitroglycerin     SubLingual 0.4 milliGRAM(s) SubLingual every 5 minutes PRN Chest Pain

## 2025-01-02 NOTE — CONSULT NOTE ADULT - NS ATTEND AMEND GEN_ALL_CORE FT
Patient discussed with KELSIE Valdes and faith at the bedside.     Endorses about 2 month history of L hand weakness.  Only in the finger flexors.  No other muscle distribution with weakness, no muscle loss, no fasciculations. No sensory loss.     On exam:   GEN: NAD  CV: rRR, S1, S2  PULM: CTAB  NEURO:   Awake, alert, oriented, speaking fluently, normal insight.   Pupils 3-2mm, symmetric, full Vf's, normal EOM, no facial weakness.   MOTOR: normal bulk and tone.  No drift.  L hand with 4++/5 finger flexors, normal AB/AD Duction, normal wrist extension, biceps, triceps, and deltoids are 5/5.  R arm 5/5 throughout.  Hip flexors, and knee extensors 5/5.   SENSORY: intact symmetrically to light touch    MRI brain images reviewed: no diffusion restriction.  no abnormal FLAIR hyperintensities.  White matter disease.     AP: 64 year old woman with DM, HTN, HLD, TIA, presenting with chest pain, with positive ROS for L hand weakness over last 2 months.  On exam, does have slight diminished  strength on the L.  All other motor distributions with normal 5/5 strength, and normal sensation.  No findings on the MRI that would explain the L hand weakness.  MRI spine also without any structural lesions that would explain the symptoms.    Could be a peripheral nerve palsy.  Brachial plexus compression less likely, but she does have a large goiter in the L.   Will need an EMG/NCS as an outpatient.  \  Chest pain workup per primary team.   No further inpatient neurology recommendations anticiapted at this time.  Please page or call with any acute neuro changes, or other issues we can help address.

## 2025-01-02 NOTE — PHYSICAL THERAPY INITIAL EVALUATION ADULT - NSPTDISCHREC_GEN_A_CORE
Pt was able to amb 75' independently w/sbgx1, small shuffling steps, usually uses a corrective shoe when amb, pt does not require acute care PT, pt may benefit from Outpt OT for left hand/finger coordination and strength exs, pt was left in bed at end of PT rx, DC from PT/No skilled PT needs

## 2025-01-02 NOTE — CONSULT NOTE ADULT - SUBJECTIVE AND OBJECTIVE BOX
CC: L hand weakness, r/o stroke      HPI:  63 y/o F w/ PMH of HTN, dyslipidemia, DM, TIA, congetal ptosis b/l, here for cardiac w/u 2/2 CP. Patient developed CP acutely  after a stressful event at her job.  Patient also h/o L hand decreased strength and paresthesia since she started a job in November. States that she has trouble grasping things sometimes. Denies any facial droop, dysarthria, unsteady gait, diplopia, any other focal weakness/paresthesias, incontinence, dysphagia, SOB.  CT head was neg for any acute findings.  Neurology is consulted for possible stroke.      PMH: HTN, DM, HLD, TIA, congenital ptosis    PSH: Ankle surgery, L and R toe amputation     Social Hx: Denies tobacco / drugs, etoh - 1 beer occasionally every 3 months      Family Hx: Father - DM, Mother - DM / HTN, sibiling - ovarian cancer, +Sister who had MI at 61 y/o       MEDICATIONS  (STANDING):  aspirin enteric coated 81 milliGRAM(s) Oral daily  atorvastatin 40 milliGRAM(s) Oral at bedtime  dextrose 5%. 1000 milliLiter(s) (100 mL/Hr) IV Continuous <Continuous>  dextrose 5%. 1000 milliLiter(s) (50 mL/Hr) IV Continuous <Continuous>  dextrose 50% Injectable 25 Gram(s) IV Push once  dextrose 50% Injectable 12.5 Gram(s) IV Push once  dextrose 50% Injectable 25 Gram(s) IV Push once  glucagon  Injectable 1 milliGRAM(s) IntraMuscular once  insulin glargine Injectable (LANTUS) 20 Unit(s) SubCutaneous every morning  insulin lispro (ADMELOG) corrective regimen sliding scale   SubCutaneous three times a day before meals  insulin lispro (ADMELOG) corrective regimen sliding scale   SubCutaneous at bedtime  lisinopril 10 milliGRAM(s) Oral daily       Allergies  sulfADIAZINE (Hives)  sulfa drugs (Hives; Pruritus)        ROS: Pertinent positives in HPI, all other ROS were reviewed and are negative.      Vital Signs Last 24 Hrs  T(C): 36.3 (02 Jan 2025 08:02), Max: 37.1 (01 Jan 2025 23:32)  T(F): 97.3 (02 Jan 2025 08:02), Max: 98.7 (01 Jan 2025 23:32)  HR: 80 (02 Jan 2025 08:02) (78 - 96)  BP: 137/76 (02 Jan 2025 08:02) (113/85 - 156/68)  BP(mean): 92 (01 Jan 2025 12:45) (92 - 92)  RR: 18 (02 Jan 2025 08:02) (18 - 20)  SpO2: 98% (02 Jan 2025 08:02) (95% - 98%)        Constitutional: awake, NAD  HEAD: Normocephalic  Neck: Supple.  Extremities:  no edema  Musculoskeletal: no abnormal movements, L foot everted, LLE height appears shorter than R   Skin: No rashes    Neurological exam:  HF: A x O x 3. Appropriately interactive, normal affect. Speech fluent, No Aphasia or paraphasic errors. Naming /repetition intact   CN: JADA, b/l ptosis, EOMI, VFF, facial sensation normal, no NLFD, tongue midline, Palate moves equally  Motor: No pronator drift, Strength 5/5 in all 4 ext except L hand  strength reduced 4-/5, L fingers ab/aduction is nml, normal bulk and tone, +mild intention tremor UE B/L  Sens: Intact to light touch   Reflexes: Hyper-reflexic all over except absent L knee jurk, L AJ, downgoing toes b/l  Coord:  No FNFA, dysmetria, NII intact   Gait/Balance: Did not test    NIHSS: 0        Labs:   01-02    137  |  107  |  27[H]  ----------------------------<  277[H]  4.4   |  27  |  0.88    Ca    9.3      02 Jan 2025 06:12  Mg     2.4     01-01    TPro  6.9  /  Alb  3.1[L]  /  TBili  0.3  /  DBili  x   /  AST  11[L]  /  ALT  19  /  AlkPhos  101  01-02 01-02 Chol 228[H] LDL -- HDL 49[L] Trig 181[H]                          11.1   5.67  )-----------( 286      ( 02 Jan 2025 06:12 )             35.7       Radiology:  < from: CT Head No Cont (01.01.25 @ 13:03) >  IMPRESSION:  No evidence of acute intracranial pathology. If clinical symptoms persist   or worsen, more sensitive evaluation with brain MRI may be obtained, if   no contraindications exist.         CC: L hand weakness, r/o stroke      HPI:  65 y/o F w/ PMH of HTN, dyslipidemia, DM, TIA, congetal ptosis b/l, here for cardiac w/u 2/2 CP. Patient developed CP acutely  after a stressful event at her job.  Patient also h/o L hand decreased strength and paresthesia since she started a job in November. States that she has trouble grasping things sometimes. Denies any facial droop, dysarthria, unsteady gait, diplopia, any other focal weakness/paresthesias, incontinence, dysphagia, SOB.  CT head was neg for any acute findings.  Neurology is consulted for possible stroke.      PMH: HTN, DM, HLD, TIA, congenital ptosis    PSH: Ankle surgery, L and R toe amputation     Social Hx: Denies tobacco / drugs, etoh - 1 beer occasionally every 3 months      Family Hx: Father - DM, Mother - DM / HTN, sibiling - ovarian cancer, +Sister who had MI at 61 y/o       MEDICATIONS  (STANDING):  aspirin enteric coated 81 milliGRAM(s) Oral daily  atorvastatin 40 milliGRAM(s) Oral at bedtime  dextrose 5%. 1000 milliLiter(s) (100 mL/Hr) IV Continuous <Continuous>  dextrose 5%. 1000 milliLiter(s) (50 mL/Hr) IV Continuous <Continuous>  dextrose 50% Injectable 25 Gram(s) IV Push once  dextrose 50% Injectable 12.5 Gram(s) IV Push once  dextrose 50% Injectable 25 Gram(s) IV Push once  glucagon  Injectable 1 milliGRAM(s) IntraMuscular once  insulin glargine Injectable (LANTUS) 20 Unit(s) SubCutaneous every morning  insulin lispro (ADMELOG) corrective regimen sliding scale   SubCutaneous three times a day before meals  insulin lispro (ADMELOG) corrective regimen sliding scale   SubCutaneous at bedtime  lisinopril 10 milliGRAM(s) Oral daily       Allergies  sulfADIAZINE (Hives)  sulfa drugs (Hives; Pruritus)        ROS: Pertinent positives in HPI, all other ROS were reviewed and are negative.      Vital Signs Last 24 Hrs  T(C): 36.3 (02 Jan 2025 08:02), Max: 37.1 (01 Jan 2025 23:32)  T(F): 97.3 (02 Jan 2025 08:02), Max: 98.7 (01 Jan 2025 23:32)  HR: 80 (02 Jan 2025 08:02) (78 - 96)  BP: 137/76 (02 Jan 2025 08:02) (113/85 - 156/68)  BP(mean): 92 (01 Jan 2025 12:45) (92 - 92)  RR: 18 (02 Jan 2025 08:02) (18 - 20)  SpO2: 98% (02 Jan 2025 08:02) (95% - 98%)        Constitutional: awake, NAD  HEAD: Normocephalic  Neck: Supple.  Extremities:  no edema  Musculoskeletal: no abnormal movements, L foot everted, LLE height appears shorter than R   Skin: No rashes    Neurological exam:  HF: A x O x 3. Appropriately interactive, normal affect. Speech fluent, No Aphasia or paraphasic errors. Naming /repetition intact   CN: JADA, b/l ptosis, EOMI, VFF, facial sensation normal, no NLFD, tongue midline, Palate moves equally  Motor: No pronator drift, Strength 5/5 in all 4 ext except L hand  strength reduced 4-/5, L fingers ab/aduction is nml, normal bulk and tone, +mild intention tremor UE B/L  Sens: Intact to light touch   Reflexes: Hyper-reflexic all over except absent L knee jurk, L AJ, downgoing toes b/l, neg Zuniga's  Coord:  No FNFA, dysmetria, NII intact   Gait/Balance: Did not test    NIHSS: 0        Labs:   01-02    137  |  107  |  27[H]  ----------------------------<  277[H]  4.4   |  27  |  0.88    Ca    9.3      02 Jan 2025 06:12  Mg     2.4     01-01    TPro  6.9  /  Alb  3.1[L]  /  TBili  0.3  /  DBili  x   /  AST  11[L]  /  ALT  19  /  AlkPhos  101  01-02 01-02 Chol 228[H] LDL -- HDL 49[L] Trig 181[H]                          11.1   5.67  )-----------( 286      ( 02 Jan 2025 06:12 )             35.7       Radiology:  < from: CT Head No Cont (01.01.25 @ 13:03) >  IMPRESSION:  No evidence of acute intracranial pathology. If clinical symptoms persist   or worsen, more sensitive evaluation with brain MRI may be obtained, if   no contraindications exist.

## 2025-01-02 NOTE — CONSULT NOTE ADULT - SUBJECTIVE AND OBJECTIVE BOX
HPI:  65 y/o F w/ PMH of HTN, dyslipidemia, DM, TIA, p/w Chest pain. Patient developed acute left chest pain a few dyas ago while at work. Chest pain is left sided, under her left breast and radiates to upper left chest area and is pinching in quality. Patient had come to ED initially, and was found to have troponin in 200s, however, patient had left against AMA. States she went home and went to sleep. When she woke up she still had pain, so she came back to ED. States she had some SOB when walking from car to ED, but denies any SOB at this time. Denies nausea, vomiting, fever, chills, recent travel. Patient also h/o left hand decreased strength since she started a job in October. States that she has trouble grasping things sometimes and has loss of dexterity. Patient is right hand dominant. Denies right arm/hand pain or weakness, neck pain, bowel/bladder dysfunction, paresthesias. Reports unsteady gait at baseline due to a bad right knee and old injury to her left ankle, however no new or worsening changes with gait.       PSH: Ankle surgery, L and R toe amputation     Social Hx: Denies tobacco / drugs, etoh - 1 beer occasionally every 3 months      Family Hx: Father - DM, Mother - DM / HTN, sibiling - ovarian cancer, +Sister who had MI at 59 y/o  (01 Jan 2025 12:23)      PAST MEDICAL & SURGICAL HISTORY:  DM (diabetes mellitus)      HTN (hypertension)      HLD (hyperlipidemia)      TIA (transient ischemic attack)      Toe amputation status, left      H/O ankle fusion          FAMILY HISTORY:  FH: ovarian cancer (Sibling)    FHx: diabetes mellitus (Father)       Noncontributory     Social Hx:  Nonsmoker, no drug or alcohol use    Allergies    sulfADIAZINE (Hives)  sulfa drugs (Hives; Pruritus)    Intolerances        MEDICATIONS  (STANDING):  aspirin enteric coated 81 milliGRAM(s) Oral daily  atorvastatin 40 milliGRAM(s) Oral at bedtime  dextrose 5%. 1000 milliLiter(s) (100 mL/Hr) IV Continuous <Continuous>  dextrose 5%. 1000 milliLiter(s) (50 mL/Hr) IV Continuous <Continuous>  dextrose 50% Injectable 25 Gram(s) IV Push once  dextrose 50% Injectable 12.5 Gram(s) IV Push once  dextrose 50% Injectable 25 Gram(s) IV Push once  enoxaparin Injectable 40 milliGRAM(s) SubCutaneous every 24 hours  glucagon  Injectable 1 milliGRAM(s) IntraMuscular once  insulin lispro (ADMELOG) corrective regimen sliding scale   SubCutaneous three times a day before meals  insulin lispro (ADMELOG) corrective regimen sliding scale   SubCutaneous at bedtime  insulin lispro Injectable (ADMELOG) 4 Unit(s) SubCutaneous three times a day before meals  lisinopril 10 milliGRAM(s) Oral daily       ROS: Pertinent positives in HPI, all other ROS were reviewed and are negative.      Vital Signs Last 24 Hrs  T(C): 37.1 (02 Jan 2025 15:14), Max: 37.1 (01 Jan 2025 23:32)  T(F): 98.7 (02 Jan 2025 15:14), Max: 98.7 (01 Jan 2025 23:32)  HR: 79 (02 Jan 2025 15:14) (78 - 80)  BP: 124/61 (02 Jan 2025 15:14) (120/68 - 137/76)  BP(mean): --  RR: 18 (02 Jan 2025 15:14) (18 - 18)  SpO2: 96% (02 Jan 2025 15:14) (96% - 98%)    Parameters below as of 02 Jan 2025 15:14  Patient On (Oxygen Delivery Method): room air        Physical Exam:  Constitutional: Awake / alert  HEENT: PERRL, EOMI  Neck: Supple  Respiratory: Respirations non-labored  Gastrointestinal: Soft, NT/ND  Extremities:  no edema  Musculoskeletal: no abnormal movements, no cervical TTP. FROM of c-spine. 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 Right foot with amputation of first 2 toes. Left foot with partial left big toes amputation   Skin: No rashes    Neurological Exam:  HF: AA&O x 3, follows commands, normal affect, speech fluent  CN: PERRL, EOMI, no NLFD, tongue midline  Motor: RUE 5/5, LUE 5/5 except 4-4+/5  and interossei, RLE 5/5 (amputation of big toe), LLE 5/5 except no movement with dorsi/plantarflexion 2/2  fused ankle (partial amputation of left big toe).  Sens: Intact to light touch  Reflexes: UE DTRs 2+, RLE DTR 2+, Diminished left patella DTR (unable to test achilles 2/2 ankle fusion). No Zuniga's. Difficult to test for babinski given fused left ankle and multiple toes amputations b/l  Coord:  No dysmetria  Gait/Balance: Cannot test        Labs:                        11.1   5.67  )-----------( 286      ( 02 Jan 2025 06:12 )             35.7     01-02    137  |  107  |  27[H]  ----------------------------<  277[H]  4.4   |  27  |  0.88    Ca    9.3      02 Jan 2025 06:12  Mg     2.4     01-01    TPro  6.9  /  Alb  3.1[L]  /  TBili  0.3  /  DBili  x   /  AST  11[L]  /  ALT  19  /  AlkPhos  101  01-02 01-02 Chol 228[H] LDL -- HDL 49[L] Trig 181[H]  PT/INR - ( 01 Jan 2025 07:44 )   PT: 10.2 sec;   INR: 0.86 ratio         PTT - ( 01 Jan 2025 07:44 )  PTT:26.1 sec    Radiology:  MR Cervical Spine No Cont (01.02.25 @ 12:43)   IMPRESSION:  1. BRAIN:   Ischemic white matter disease and atrophy typical for age. No   evidence of infarction    2. CERVICAL SPINE:   Moderate cervical degenerative disc disease includes   C3-C4 focal left central and C5-C6 more broad right central disc   protrusions (disc herniation) that cause  ventral cord deformity

## 2025-01-02 NOTE — PROGRESS NOTE ADULT - ASSESSMENT
65 yo woman with DM, HTM, HLD, hx of TIA, presented with chest discomfort on 12/31, acute in onset, occurred with stressful event at work, also anniversary of family member's passing, described chest pain as L sided, under L breast, radiated to upper chest, had some mild dyspnea, no other associated symptoms. Her troponin was elevated, EKG rather unremarkable and she left AMA. Returned 1/1 with mild discomfort but amenable to complete her evaluation. She was also noted to have diminished  strength in L hand but no other associated symptoms. Admitted to Medicine.     Chest discomfort  Atypical. EKG without ischemic changes. Appears to have eased. For stress test 1/3. Continue to monitor on tele. Continue aspirin, statin.     Diminished  strength in L hand  Appreciate input from Neurology. MR Brain obtained. Negative for infarct. C spine MRI with moderate cervical degenerative disc disease includes C3-C4 focal left central and C5-C6 more broad right central disc protrusions (disc herniation) that cause  ventral cord deformity. Neurology recommended Neurosurgery consult, order placed.     DM  Poorly controlled. A1c 12.4. Ordered for insulin lantus and lispro.     HTN  BP 120s/60s. Continue lisinopril.

## 2025-01-02 NOTE — PROGRESS NOTE ADULT - TIME BILLING
- extensive review of patient's medical chart including prior hospital encounters, current admission progress notes, labs, imaging and other testing, seeing and evaluating patient at bedside, explaining to patient regarding current condition and plan of care, then ordering tests and collaborating with specialty consultants including Cardiology and Neurology, and finally, documenting today's findings and plan.

## 2025-01-02 NOTE — PROGRESS NOTE ADULT - SUBJECTIVE AND OBJECTIVE BOX
PCP:    REQUESTING PHYSICIAN:    REASON FOR CONSULT:    CHIEF COMPLAINT:    HPI:  63 y/o F w/ PMH of HTN, dyslipidemia, DM, TIA, p/w CP. Patient developed CP acutely yesterday after a stressful event at her job. States that CP is L sided, under L breast and radiates to upper L chest area and is pinching in quality. Patient had come to ED initially, and was found to have troponin in 200s. However, patient had left against AMA. States she went home and went to sleep. When she woke up she still had CP lightly, so she came back to ED. States she had some SOB when walking from car to ED, but denies any SOB at this time. Denies nausea, vomiting, fever, chills, recent travel. Patient has some mild LE edema, LLE > RLE.   Cardiology called to evaluate symptoms.     1/2/25:  Pt is sitting in bed in NAD.   Denies CP, SOB, lightheadedness, palpitations.  States c/o tingling / numbness in fingers.  Tele:  SR 70-80s      PAST MEDICAL & SURGICAL HISTORY:  DM (diabetes mellitus)  HTN (hypertension)  HLD (hyperlipidemia)  TIA (transient ischemic attack)  Toe amputation status, left  H/O ankle fusion    Social Hx:   Denies tobacco / drugs, etoh - 1 beer occasionally every 3 months      Family Hx:   Father - DM, Mother - DM / HTN, sibiling - ovarian cancer, +Sister who had MI at 59 y/o     Allergies  sulfADIAZINE (Hives)  sulfa drugs (Hives; Pruritus)      MEDICATIONS:  Home Medications:  Glucophage 850 mg oral tablet: 1 tab(s) orally 2 times a day (01 Jan 2025 08:39)  HumaLOG 100 units/mL injectable solution: injectable 3 times a day (with meals) ***sliding scale*** (01 Jan 2025 12:26)  Lantus 100 units/mL subcutaneous solution: 20 unit(s) subcutaneous once a day (in the evening) (01 Jan 2025 12:26)  Lotensin 10 mg oral tablet: 1 tab(s) orally once a day   Ozempic (1 mg dose) 4 mg/3 mL subcutaneous solution: 1  subcutaneous once a week on mondays (01 Jan 2025 08:41)  Tylenol 500 mg oral tablet: 2 tab(s) orally every 6 hours, As Needed (01 Jan 2025 08:39)    MEDICATIONS  (STANDING):  aspirin enteric coated 81 milliGRAM(s) Oral daily  atorvastatin 40 milliGRAM(s) Oral at bedtime  dextrose 5%. 1000 milliLiter(s) (100 mL/Hr) IV Continuous <Continuous>  dextrose 5%. 1000 milliLiter(s) (50 mL/Hr) IV Continuous <Continuous>  dextrose 50% Injectable 25 Gram(s) IV Push once  dextrose 50% Injectable 12.5 Gram(s) IV Push once  dextrose 50% Injectable 25 Gram(s) IV Push once  enoxaparin Injectable 40 milliGRAM(s) SubCutaneous every 24 hours  glucagon  Injectable 1 milliGRAM(s) IntraMuscular once  insulin lispro (ADMELOG) corrective regimen sliding scale   SubCutaneous three times a day before meals  insulin lispro (ADMELOG) corrective regimen sliding scale   SubCutaneous at bedtime  insulin lispro Injectable (ADMELOG) 4 Unit(s) SubCutaneous three times a day before meals  lisinopril 10 milliGRAM(s) Oral daily    MEDICATIONS  (PRN):  acetaminophen     Tablet .. 650 milliGRAM(s) Oral every 6 hours PRN Mild Pain (1 - 3)  dextrose Oral Gel 15 Gram(s) Oral once PRN Blood Glucose LESS THAN 70 milliGRAM(s)/deciliter  nitroglycerin     SubLingual 0.4 milliGRAM(s) SubLingual every 5 minutes PRN Chest Pain      Vital Signs Last 24 Hrs  T(C): 36.3 (02 Jan 2025 08:02), Max: 37.1 (01 Jan 2025 23:32)  T(F): 97.3 (02 Jan 2025 08:02), Max: 98.7 (01 Jan 2025 23:32)  HR: 80 (02 Jan 2025 08:02) (78 - 96)  BP: 137/76 (02 Jan 2025 08:02) (113/85 - 137/76)  BP(mean): --  RR: 18 (02 Jan 2025 08:02) (18 - 18)  SpO2: 98% (02 Jan 2025 08:02) (95% - 98%)    Parameters below as of 02 Jan 2025 08:02  Patient On (Oxygen Delivery Method): room air    I&O's Summary     Daily       PHYSICAL EXAM:  Constitutional: NAD, awake and alert, well-developed  Neck:  supple,  No JVD  Respiratory: Breath sounds are clear bilaterally, No wheezing, rales or rhonchi  Cardiovascular: S1 and S2, regular rate and rhythm, no Murmurs, gallops or rubs; trace RLE edema, no LLE edema  Gastrointestinal: Bowel Sounds present, soft, nontender.   Neurological: A/O x 3, no focal deficits      LABS:                         11.1   5.67  )-----------( 286      ( 02 Jan 2025 06:12 )             35.7                         11.5   4.71  )-----------( 321      ( 01 Jan 2025 07:44 )             36.3                         12.1   5.66  )-----------( 298      ( 31 Dec 2024 18:59 )             38.9   01-02    137  |  107  |  27[H]  ----------------------------<  277[H]  4.4   |  27  |  0.88    Ca    9.3      02 Jan 2025 06:12  Mg     2.4     01-01    TPro  6.9  /  Alb  3.1[L]  /  TBili  0.3  /  DBili  x   /  AST  11[L]  /  ALT  19  /  AlkPhos  101  01-02    01 Jan 2025 07:44    133    |  103    |  14     ----------------------------<  325    4.3     |  25     |  0.87   31 Dec 2024 18:59    136    |  105    |  18     ----------------------------<  255    4.3     |  28     |  0.82     Ca    8.9        01 Jan 2025 07:44  Ca    9.5        31 Dec 2024 18:59  Mg     2.4       01 Jan 2025 07:44    TPro  7.5    /  Alb  3.3    /  TBili  0.3    /  DBili  x      /  AST  14     /  ALT  22     /  AlkPhos  113    01 Jan 2025 07:44  TPro  7.7    /  Alb  3.6    /  TBili  0.3    /  DBili  x      /  AST  13     /  ALT  30     /  AlkPhos  114    31 Dec 2024 18:59    PT/INR - ( 01 Jan 2025 07:44 )   PT: 10.2 sec;   INR: 0.86 ratio      PTT - ( 01 Jan 2025 07:44 )  PTT:26.1 sec    trop (12/31) 219.3--> (1/1) 85.9-->67.5-->47.6  BNP (1/1) 233      EKG / CARDIAC TESTING  < from: 12 Lead ECG (12.31.24 @ 17:20) >  Diagnosis Line Normal sinus rhythm  Minimal voltage criteria for LVH, may be normal variant ( Sokolow-Martinez )  Borderline ECG  When compared with ECG of 25-APR-2022 17:21,  No significant change was found  < end of copied text >    < from: 12 Lead ECG (01.01.25 @ 07:06) >  Diagnosis Line Normal sinus rhythm  Normal ECG  When compared with ECG of 31-DEC-2024 19:59,  No significant change was found  Confirmed by Vandana Gottlieb ([id number]) on 1/1/2025 10:52:58 PM  < end of copied text >    RADIOLOGY  < from: Xray Chest 1 View- PORTABLE-Urgent (Xray Chest 1 View- PORTABLE-Urgent .) (12.31.24 @ 19:48) >  IMPRESSION:  Clear lungs.  < end of copied text >    < from: CT Head No Cont (01.01.25 @ 13:03) >  IMPRESSION:  No evidence of acute intracranial pathology. If clinical symptoms persist   or worsen, more sensitive evaluation with brain MRI may be obtained, if   no contraindications exist.  < end of copied text >    < from: US Duplex Venous Lower Ext Complete, Bilateral (01.01.25 @ 14:16) >  IMPRESSION:  No evidence of deep venous thrombosis in either lower extremity.  < end of copied text >     REASON FOR VISIT: Elevated troponin, CP    HPI:  63 y/o F w/ PMH of HTN, dyslipidemia, DM, TIA, p/w CP. Patient developed CP acutely yesterday after a stressful event at her job. States that CP is L sided, under L breast and radiates to upper L chest area and is pinching in quality. Patient had come to ED initially, and was found to have troponin in 200s. However, patient had left against AMA. States she went home and went to sleep. When she woke up she still had CP lightly, so she came back to ED. States she had some SOB when walking from car to ED, but denies any SOB at this time. Denies nausea, vomiting, fever, chills, recent travel. Patient has some mild LE edema, LLE > RLE.   Cardiology called to evaluate symptoms.     1/2/25:  Pt is sitting in bed in NAD.   Denies CP, SOB, lightheadedness, palpitations.  States c/o tingling / numbness in fingers.  Tele:  SR 70-80s      PAST MEDICAL & SURGICAL HISTORY:  DM (diabetes mellitus)  HTN (hypertension)  HLD (hyperlipidemia)  TIA (transient ischemic attack)  Toe amputation status, left  H/O ankle fusion    Social Hx:   Denies tobacco / drugs, etoh - 1 beer occasionally every 3 months      Family Hx:   Father - DM, Mother - DM / HTN, sibiling - ovarian cancer, +Sister who had MI at 61 y/o     Allergies  sulfADIAZINE (Hives)  sulfa drugs (Hives; Pruritus)      MEDICATIONS:  Home Medications:  Glucophage 850 mg oral tablet: 1 tab(s) orally 2 times a day (01 Jan 2025 08:39)  HumaLOG 100 units/mL injectable solution: injectable 3 times a day (with meals) ***sliding scale*** (01 Jan 2025 12:26)  Lantus 100 units/mL subcutaneous solution: 20 unit(s) subcutaneous once a day (in the evening) (01 Jan 2025 12:26)  Lotensin 10 mg oral tablet: 1 tab(s) orally once a day   Ozempic (1 mg dose) 4 mg/3 mL subcutaneous solution: 1  subcutaneous once a week on mondays (01 Jan 2025 08:41)  Tylenol 500 mg oral tablet: 2 tab(s) orally every 6 hours, As Needed (01 Jan 2025 08:39)    MEDICATIONS  (STANDING):  aspirin enteric coated 81 milliGRAM(s) Oral daily  atorvastatin 40 milliGRAM(s) Oral at bedtime  dextrose 5%. 1000 milliLiter(s) (100 mL/Hr) IV Continuous <Continuous>  dextrose 5%. 1000 milliLiter(s) (50 mL/Hr) IV Continuous <Continuous>  dextrose 50% Injectable 25 Gram(s) IV Push once  dextrose 50% Injectable 12.5 Gram(s) IV Push once  dextrose 50% Injectable 25 Gram(s) IV Push once  enoxaparin Injectable 40 milliGRAM(s) SubCutaneous every 24 hours  glucagon  Injectable 1 milliGRAM(s) IntraMuscular once  insulin lispro (ADMELOG) corrective regimen sliding scale   SubCutaneous three times a day before meals  insulin lispro (ADMELOG) corrective regimen sliding scale   SubCutaneous at bedtime  insulin lispro Injectable (ADMELOG) 4 Unit(s) SubCutaneous three times a day before meals  lisinopril 10 milliGRAM(s) Oral daily    MEDICATIONS  (PRN):  acetaminophen     Tablet .. 650 milliGRAM(s) Oral every 6 hours PRN Mild Pain (1 - 3)  dextrose Oral Gel 15 Gram(s) Oral once PRN Blood Glucose LESS THAN 70 milliGRAM(s)/deciliter  nitroglycerin     SubLingual 0.4 milliGRAM(s) SubLingual every 5 minutes PRN Chest Pain      Vital Signs Last 24 Hrs  T(C): 36.3 (02 Jan 2025 08:02), Max: 37.1 (01 Jan 2025 23:32)  T(F): 97.3 (02 Jan 2025 08:02), Max: 98.7 (01 Jan 2025 23:32)  HR: 80 (02 Jan 2025 08:02) (78 - 96)  BP: 137/76 (02 Jan 2025 08:02) (113/85 - 137/76)  RR: 18 (02 Jan 2025 08:02) (18 - 18)  SpO2: 98% (02 Jan 2025 08:02) (95% - 98%)  Patient On (Oxygen Delivery Method): room air    PHYSICAL EXAM:  Constitutional: NAD, awake and alert, well-developed  Neck:  supple,  No JVD  Respiratory: Breath sounds are clear bilaterally, No wheezing, rales or rhonchi  Cardiovascular: S1 and S2, regular rate and rhythm, no Murmurs, gallops or rubs; trace RLE edema, no LLE edema  Gastrointestinal: Bowel Sounds present, soft, nontender.   Neurological: A/O x 3, no focal deficits    LABS:                         11.1   5.67  )-----------( 286      ( 02 Jan 2025 06:12 )             35.7                137  |  107  |  27[H]  ----------------------------<  277[H]  4.4   |  27  |  0.88    TPro  7.5    /  Alb  3.3    /  TBili  0.3    /  DBili  x      /  AST  14     /  ALT  22     /  AlkPhos  113    01 Jan 2025 07:44  TPro  7.7    /  Alb  3.6    /  TBili  0.3    /  DBili  x      /  AST  13     /  ALT  30     /  AlkPhos  114    31 Dec 2024 18:59    PT/INR - ( 01 Jan 2025 07:44 )   PT: 10.2 sec;   INR: 0.86 ratio      PTT - ( 01 Jan 2025 07:44 )  PTT:26.1 sec    trop (12/31) 219.3--> (1/1) 85.9-->67.5-->47.6  BNP (1/1) 233      EKG / CARDIAC TESTING  < from: 12 Lead ECG (12.31.24 @ 17:20) >  Diagnosis Line Normal sinus rhythm  Minimal voltage criteria for LVH, may be normal variant ( Sokolow-Martinez )  Borderline ECG  When compared with ECG of 25-APR-2022 17:21,  No significant change was found  < end of copied text >    < from: 12 Lead ECG (01.01.25 @ 07:06) >  Diagnosis Line Normal sinus rhythm  Normal ECG  When compared with ECG of 31-DEC-2024 19:59,  No significant change was found  Confirmed by Vandana Gottlieb ([id number]) on 1/1/2025 10:52:58 PM  < end of copied text >    RADIOLOGY  < from: Xray Chest 1 View- PORTABLE-Urgent (Xray Chest 1 View- PORTABLE-Urgent .) (12.31.24 @ 19:48) >  IMPRESSION:  Clear lungs.  < end of copied text >    < from: CT Head No Cont (01.01.25 @ 13:03) >  IMPRESSION:  No evidence of acute intracranial pathology. If clinical symptoms persist   or worsen, more sensitive evaluation with brain MRI may be obtained, if   no contraindications exist.  < end of copied text >    < from: US Duplex Venous Lower Ext Complete, Bilateral (01.01.25 @ 14:16) >  IMPRESSION:  No evidence of deep venous thrombosis in either lower extremity.  < end of copied text >

## 2025-01-03 ENCOUNTER — RESULT REVIEW (OUTPATIENT)
Age: 65
End: 2025-01-03

## 2025-01-03 ENCOUNTER — TRANSCRIPTION ENCOUNTER (OUTPATIENT)
Age: 65
End: 2025-01-03

## 2025-01-03 VITALS
HEART RATE: 81 BPM | DIASTOLIC BLOOD PRESSURE: 71 MMHG | SYSTOLIC BLOOD PRESSURE: 137 MMHG | RESPIRATION RATE: 19 BRPM | OXYGEN SATURATION: 98 % | TEMPERATURE: 98 F

## 2025-01-03 LAB
A1C WITH ESTIMATED AVERAGE GLUCOSE RESULT: 11.7 % — HIGH (ref 4–5.6)
ANION GAP SERPL CALC-SCNC: 3 MMOL/L — LOW (ref 5–17)
BUN SERPL-MCNC: 27 MG/DL — HIGH (ref 7–23)
CALCIUM SERPL-MCNC: 8.4 MG/DL — LOW (ref 8.5–10.1)
CHLORIDE SERPL-SCNC: 108 MMOL/L — SIGNIFICANT CHANGE UP (ref 96–108)
CO2 SERPL-SCNC: 26 MMOL/L — SIGNIFICANT CHANGE UP (ref 22–31)
CREAT SERPL-MCNC: 0.79 MG/DL — SIGNIFICANT CHANGE UP (ref 0.5–1.3)
EGFR: 85 ML/MIN/1.73M2 — SIGNIFICANT CHANGE UP
ESTIMATED AVERAGE GLUCOSE: 289 MG/DL — HIGH (ref 68–114)
GLUCOSE BLDC GLUCOMTR-MCNC: 249 MG/DL — HIGH (ref 70–99)
GLUCOSE BLDC GLUCOMTR-MCNC: 254 MG/DL — HIGH (ref 70–99)
GLUCOSE SERPL-MCNC: 248 MG/DL — HIGH (ref 70–99)
POTASSIUM SERPL-MCNC: 4.2 MMOL/L — SIGNIFICANT CHANGE UP (ref 3.5–5.3)
POTASSIUM SERPL-SCNC: 4.2 MMOL/L — SIGNIFICANT CHANGE UP (ref 3.5–5.3)
SODIUM SERPL-SCNC: 137 MMOL/L — SIGNIFICANT CHANGE UP (ref 135–145)

## 2025-01-03 PROCEDURE — 78452 HT MUSCLE IMAGE SPECT MULT: CPT | Mod: 26

## 2025-01-03 PROCEDURE — 93018 CV STRESS TEST I&R ONLY: CPT

## 2025-01-03 PROCEDURE — 99232 SBSQ HOSP IP/OBS MODERATE 35: CPT

## 2025-01-03 PROCEDURE — 99239 HOSP IP/OBS DSCHRG MGMT >30: CPT

## 2025-01-03 PROCEDURE — 93016 CV STRESS TEST SUPVJ ONLY: CPT | Mod: 26

## 2025-01-03 RX ORDER — ATORVASTATIN CALCIUM 40 MG/1
1 TABLET, FILM COATED ORAL
Refills: 0 | DISCHARGE

## 2025-01-03 RX ORDER — ASPIRIN 81 MG
1 TABLET, DELAYED RELEASE (ENTERIC COATED) ORAL
Refills: 0 | DISCHARGE

## 2025-01-03 RX ORDER — LEVOTHYROXINE SODIUM 175 UG/1
1 TABLET ORAL
Refills: 0 | DISCHARGE

## 2025-01-03 RX ORDER — METOPROLOL TARTRATE 50 MG
1 TABLET ORAL
Refills: 0 | DISCHARGE

## 2025-01-03 RX ADMIN — Medication 4 UNIT(S): at 11:54

## 2025-01-03 RX ADMIN — Medication 81 MILLIGRAM(S): at 11:17

## 2025-01-03 RX ADMIN — Medication 4: at 07:57

## 2025-01-03 RX ADMIN — LISINOPRIL 10 MILLIGRAM(S): 30 TABLET ORAL at 11:17

## 2025-01-03 RX ADMIN — Medication 6: at 11:54

## 2025-01-03 NOTE — DISCHARGE NOTE PROVIDER - HOSPITAL COURSE
65 yo woman with DM, HTM, HLD, hx of TIA, presented with chest discomfort on 12/31, acute in onset, occurred with stressful event at work, also anniversary of family member's passing, described chest pain as L sided, under L breast, radiated to upper chest, had some mild dyspnea, no other associated symptoms. Her troponin was elevated, EKG rather unremarkable and she left AMA. Returned 1/1 with mild discomfort but amenable to complete her evaluation. She was also noted to have diminished  strength in L hand but no other associated symptoms. Admitted to Medicine.     Chest discomfort  Atypical, in setting of work stress and thoughts about the anniversary of her son's passing. Symptoms since resolved. EKG without ischemic changes. CXR clear. Echo with LV cavity normal in size, wall thickness normal, EF 70 to 75%, no LV hypertrophy, no diastolic dysfunction. Normal RV. Normal LA. Normal RA. Interatrial septum aneurysmal. No AS or AI. No MS or MR. Trace TR. Normal PASP estimate. Normal aortic root. No pericardial effusion. Normal IVC. Stress MPI with normal myocardial perfusion, no evidence of infarction or inducible ischemia. Baseline electrocardiogram normal sinus rhythm with no arrhythmias. Stress electrocardiogram with no ischemic ST segment changes. The post stress left ventricular EF is 72 %. The stress end diastolic volume is 83 ml and systolic volume is 23 ml. Continue aspirin, statin, metoprolol, lotensin.     Diminished  strength in L hand  Appreciate input from Neurology. MR Brain done, negative for infarct. C spine MRI with moderate cervical degenerative disc disease includes C3-C4 focal left central and C5-C6 more broad right central disc protrusions (disc herniation) that causes ventral cord deformity. Neurosurgery input appreciated. Left hand decreased strength since she started a job in October. Has trouble grasping things sometimes and has loss of dexterity. Patient is right hand dominant. Denies right arm/hand pain or weakness, neck pain, bowel/bladder dysfunction, paresthesias. Reports unsteady gait at baseline due to a bad right knee and old injury to her left ankle, however no new or worsening changes with gait.  No acute neurosurgical interventions indicated at this time. Also she reports she would not want any surgical interventions at this time even if it were offered as she is currently obtaining a doctorate degree with plans to finish in 3 months and does not want anything interfering with this plan. Will refer for outpatient EMG, physical therapy, outpatient follow up with Neurology Dr Craven and Neurosurgery Dr. Bonilla after discharge.    DM  Poorly controlled. A1c 12.4. However unclear whether she has been consistent with her regimen. Recommend she closely adhere to her regimen, maintain a glucose log and follow up with Primary Care.     HTN  BP 120s/60s. Continue lotensin, metoprolol.     HLD  Continue statin. Will consider adding Zetia as outpatient.     Hypothyroidism  Continue levothyroxine

## 2025-01-03 NOTE — OCCUPATIONAL THERAPY INITIAL EVALUATION ADULT - NSACTIVITYREC_GEN_A_OT
Pt presents with impaired dexterity, in hand manipulation, FMC, and muscle strength that will benefit from skilled OT to improve independence in ADLs, reduce fall risk and chance for readmission. Pt provided with HEP's at EOB to enhance LUE MS and FMC, and basket with FMC items (stickers, tape, pen, blue foam block, etc.) to perform t/o the day.

## 2025-01-03 NOTE — DISCHARGE NOTE NURSING/CASE MANAGEMENT/SOCIAL WORK - NSDCPEFALRISK_GEN_ALL_CORE
For information on Fall & Injury Prevention, visit: [email or web address] OR  [email or web address] OR  https://www.cdc.gov/steadi/patient.html

## 2025-01-03 NOTE — OCCUPATIONAL THERAPY INITIAL EVALUATION ADULT - OCCUPATION
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

## 2025-01-03 NOTE — DISCHARGE NOTE NURSING/CASE MANAGEMENT/SOCIAL WORK - NSDCPNINST_GEN_ALL_CORE
For a complete copy of medical records please visit : [email or web address]

## 2025-01-03 NOTE — DISCHARGE NOTE PROVIDER - NSDCFUADDAPPT_GEN_ALL_CORE_FT
APPTS ARE READY TO BE MADE: [X] YES    Cardiology Dr Omar Mock, within 2 weeks  Primary Care Dr Annalisa Camacho, within 2 weeks  Neurology Dr. Christian Craven, within 2 weeks  Neurosurgery Dr. Manny Bonilla, within 2 weeks

## 2025-01-03 NOTE — DISCHARGE NOTE PROVIDER - PROVIDER TOKENS
PROVIDER:[TOKEN:[[id number]],FOLLOWUP:[1 week]],PROVIDER:[TOKEN:[[id number]],FOLLOWUP:[1 week]],PROVIDER:[TOKEN:[[id number]],FOLLOWUP:[2 weeks]],PROVIDER:[TOKEN:[[id number]],FOLLOWUP:[2 weeks]]

## 2025-01-03 NOTE — PROGRESS NOTE ADULT - SUBJECTIVE AND OBJECTIVE BOX
REASON FOR VISIT: Elevated troponin, CP    HPI:  63 y/o F w/ PMH of HTN, dyslipidemia, DM, TIA, p/w CP. Patient developed CP acutely yesterday after a stressful event at her job. States that CP is L sided, under L breast and radiates to upper L chest area and is pinching in quality. Patient had come to ED initially, and was found to have troponin in 200s. However, patient had left against AMA. States she went home and went to sleep. When she woke up she still had CP lightly, so she came back to ED. States she had some SOB when walking from car to ED, but denies any SOB at this time. Denies nausea, vomiting, fever, chills, recent travel. Patient has some mild LE edema, LLE > RLE.   Cardiology called to evaluate symptoms.     1/2/25:  Pt is sitting in bed in NAD.   Denies CP, SOB, lightheadedness, palpitations.  States c/o tingling / numbness in fingers.  Tele:  SR 70-80s  1/3/25: Pt went for nuclear stress test this am. Denies cp or sob. Tele: RSR      PAST MEDICAL & SURGICAL HISTORY:  DM (diabetes mellitus)  HTN (hypertension)  HLD (hyperlipidemia)  TIA (transient ischemic attack)  Toe amputation status, left  H/O ankle fusion    Social Hx:   Denies tobacco / drugs, etoh - 1 beer occasionally every 3 months      Family Hx:   Father - DM, Mother - DM / HTN, sibiling - ovarian cancer, +Sister who had MI at 59 y/o     Allergies  sulfADIAZINE (Hives)  sulfa drugs (Hives; Pruritus)      MEDICATIONS:  Home Medications:  Glucophage 850 mg oral tablet: 1 tab(s) orally 2 times a day (01 Jan 2025 08:39)  HumaLOG 100 units/mL injectable solution: injectable 3 times a day (with meals) ***sliding scale*** (01 Jan 2025 12:26)  Lantus 100 units/mL subcutaneous solution: 20 unit(s) subcutaneous once a day (in the evening) (01 Jan 2025 12:26)  Lotensin 10 mg oral tablet: 1 tab(s) orally once a day   Ozempic (1 mg dose) 4 mg/3 mL subcutaneous solution: 1  subcutaneous once a week on mondays (01 Jan 2025 08:41)  Tylenol 500 mg oral tablet: 2 tab(s) orally every 6 hours, As Needed (01 Jan 2025 08:39)    MEDICATIONS  (STANDING):  aspirin enteric coated 81 milliGRAM(s) Oral daily  atorvastatin 40 milliGRAM(s) Oral at bedtime  dextrose 5%. 1000 milliLiter(s) (100 mL/Hr) IV Continuous <Continuous>  dextrose 5%. 1000 milliLiter(s) (50 mL/Hr) IV Continuous <Continuous>  dextrose 50% Injectable 25 Gram(s) IV Push once  dextrose 50% Injectable 12.5 Gram(s) IV Push once  dextrose 50% Injectable 25 Gram(s) IV Push once  enoxaparin Injectable 40 milliGRAM(s) SubCutaneous every 24 hours  glucagon  Injectable 1 milliGRAM(s) IntraMuscular once  insulin glargine Injectable (LANTUS) 20 Unit(s) SubCutaneous at bedtime  insulin lispro (ADMELOG) corrective regimen sliding scale   SubCutaneous three times a day before meals  insulin lispro (ADMELOG) corrective regimen sliding scale   SubCutaneous at bedtime  insulin lispro Injectable (ADMELOG) 4 Unit(s) SubCutaneous three times a day before meals  lisinopril 10 milliGRAM(s) Oral daily    MEDICATIONS  (PRN):  acetaminophen     Tablet .. 650 milliGRAM(s) Oral every 6 hours PRN Mild Pain (1 - 3)  dextrose Oral Gel 15 Gram(s) Oral once PRN Blood Glucose LESS THAN 70 milliGRAM(s)/deciliter  nitroglycerin     SubLingual 0.4 milliGRAM(s) SubLingual every 5 minutes PRN Chest Pain    Vital Signs Last 24 Hrs  T(C): 36.5 (03 Jan 2025 07:49), Max: 37.1 (02 Jan 2025 15:14)  T(F): 97.7 (03 Jan 2025 07:49), Max: 98.7 (02 Jan 2025 15:14)  HR: 75 (03 Jan 2025 07:49) (75 - 85)  BP: 116/54 (03 Jan 2025 07:49) (116/54 - 156/67)  BP(mean): --  RR: 18 (03 Jan 2025 07:49) (18 - 18)  SpO2: 100% (03 Jan 2025 07:49) (96% - 100%)    Parameters below as of 03 Jan 2025 07:49  Patient On (Oxygen Delivery Method): room air      PHYSICAL EXAM:  Constitutional: NAD, awake and alert, well-developed  Neck:  supple,  No JVD  Respiratory: Breath sounds are clear bilaterally, No wheezing, rales or rhonchi  Cardiovascular: S1 and S2, regular rate and rhythm, no Murmurs, gallops or rubs; trace RLE edema, no LLE edema  Gastrointestinal: Bowel Sounds present, soft, nontender.   Neurological: A/O x 3, no focal deficits    LABS:                                     11.1   5.67  )-----------( 286      ( 02 Jan 2025 06:12 )             35.7     01-03    137  |  108  |  27[H]  ----------------------------<  248[H]  4.2   |  26  |  0.79    Ca    8.4[L]      03 Jan 2025 06:38    TPro  6.9  /  Alb  3.1[L]  /  TBili  0.3  /  DBili  x   /  AST  11[L]  /  ALT  19  /  AlkPhos  101  01-02                 137  |  107  |  27[H]  ----------------------------<  277[H]  4.4   |  27  |  0.88    TPro  7.5    /  Alb  3.3    /  TBili  0.3    /  DBili  x      /  AST  14     /  ALT  22     /  AlkPhos  113    01 Jan 2025 07:44  TPro  7.7    /  Alb  3.6    /  TBili  0.3    /  DBili  x      /  AST  13     /  ALT  30     /  AlkPhos  114    31 Dec 2024 18:59    PT/INR - ( 01 Jan 2025 07:44 )   PT: 10.2 sec;   INR: 0.86 ratio      PTT - ( 01 Jan 2025 07:44 )  PTT:26.1 sec    trop (12/31) 219.3--> (1/1) 85.9-->67.5-->47.6  BNP (1/1) 233      EKG / CARDIAC TESTING  < from: 12 Lead ECG (12.31.24 @ 17:20) >  Diagnosis Line Normal sinus rhythm  Minimal voltage criteria for LVH, may be normal variant ( Sokolow-Martinez )  Borderline ECG  When compared with ECG of 25-APR-2022 17:21,  No significant change was found  < end of copied text >    < from: 12 Lead ECG (01.01.25 @ 07:06) >  Diagnosis Line Normal sinus rhythm  Normal ECG  When compared with ECG of 31-DEC-2024 19:59,  No significant change was found  Confirmed by Vandana Gottlieb ([id number]) on 1/1/2025 10:52:58 PM  < end of copied text >    RADIOLOGY  < from: Xray Chest 1 View- PORTABLE-Urgent (Xray Chest 1 View- PORTABLE-Urgent .) (12.31.24 @ 19:48) >  IMPRESSION:  Clear lungs.  < end of copied text >    < from: CT Head No Cont (01.01.25 @ 13:03) >  IMPRESSION:  No evidence of acute intracranial pathology. If clinical symptoms persist   or worsen, more sensitive evaluation with brain MRI may be obtained, if   no contraindications exist.  < end of copied text >    < from: US Duplex Venous Lower Ext Complete, Bilateral (01.01.25 @ 14:16) >  IMPRESSION:  No evidence of deep venous thrombosis in either lower extremity.  < end of copied text >      < from: TTE W or WO Ultrasound Enhancing Agent (01.02.25 @ 11:13) >   1. Technically difficult image quality.    < end of copied text >

## 2025-01-03 NOTE — OCCUPATIONAL THERAPY INITIAL EVALUATION ADULT - COORDINATION ASSESSED, REHAB EVAL
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

## 2025-01-03 NOTE — DISCHARGE NOTE PROVIDER - CARE PROVIDER_API CALL
Omar Mock  Cardiovascular Disease  [address], [address]  [place] [address]  Phone: [phone]  Fax: [phone]  Follow Up Time: 1 week    Annalisa Camacho  Internal Medicine  [address] [place] [address]  Phone: [phone]  Fax: [phone]  Follow Up Time: 1 week    Christian Craven  Neurology  [address]  [place] [address]  Phone: [phone]  Fax: [phone]  Follow Up Time: 2 weeks    Manny Bonilla [place]nie  Neurosurgery  [address] [place] [address]  Phone: [phone]  Fax: [phone]  Follow Up Time: 2 weeks

## 2025-01-03 NOTE — DISCHARGE NOTE PROVIDER - CARE PROVIDERS DIRECT ADDRESSES
[email or web address]

## 2025-01-03 NOTE — OCCUPATIONAL THERAPY INITIAL EVALUATION ADULT - ADDITIONAL COMMENTS
Pt reports she resides in a home alone with 4 JACOBY +elevator assess. Pt (I) with all ADL/IADL tasks, walks without AD.

## 2025-01-03 NOTE — DISCHARGE NOTE PROVIDER - NSDCCPCAREPLAN_GEN_ALL_CORE_FT
PRINCIPAL DISCHARGE DIAGNOSIS  Diagnosis: Chest pain  Assessment and Plan of Treatment: Likely related to work stress and other stressors / thoughts. Reassuringly, symptoms have eased and workup here was negative. chest xray clear. Ddimer blood test negative, no sign of blood clot. EKG with no signs of ischemia. Echcoardiogram done. Normal left ventricular size, wall thickness and squeezing fuction. Normal right ventrical. Normal atria. No significant heart valve disease, no pulmonary hypertension. Normal aortic root. No pericardial effusion. Stress test negative, with normal myocardial perfusion, no evidence of infarction or inducible ischemia. Baseline electrocardiogram normal sinus rhythm with no arrhythmias. Stress electrocardiogram with no ischemic changes. Continue aspirin, statin, metoprolol, lotensin. Follow up as outpatient with Cardiology Dr. Mock.      SECONDARY DISCHARGE DIAGNOSES  Diagnosis: Left hand weakness  Assessment and Plan of Treatment: You were noted to have diminished  strength in L hand. You were seen and evaluated by Neurology who advised MR imaging (MRI). MR brain done, negative for infarct (stroke). Cervical spine MRI with moderate cervical degenerative disc disease including C3-C4 focal left central and C5-C6 more broad right central disc protrusions (disc herniation) that cause ventral cord deformity. Neurosurgery input appreciated. Left hand decreased strength since you started a job in October, trouble grasping things sometimes, loss of dexterity. No right arm/hand pain or weakness, neck pain, bowel/bladder dysfunction, paresthesias. Unsteady gait at baseline due to a bad right knee and old injury to left ankle. No new or worsening changes with gait.  No acute neurosurgical interventions indicated at this time per Neurosurgery. Also you reported that you would not want any surgical interventions at this time until you complete doctorate degree in 3 months, prefer no interference with this plan. Recommend outpatient EMG (nerve condution test), physical therapy, outpatient follow up with Neurology Dr Craven and Neurosurgery Dr. Bonilla after discharge.    Diagnosis: Diabetes mellitus  Assessment and Plan of Treatment: Poorly controlled but may relate to medication lapses. Advise you to closely adhere to regimen, maintain a glucose log, and follow up with Primary Care.    Diagnosis: Hypertension  Assessment and Plan of Treatment: Well-controlled. Continue lotensin, metoprolol.

## 2025-01-03 NOTE — PROGRESS NOTE ADULT - PROBLEM SELECTOR PLAN 1
-  Elevated trop (12/31) 219.3--> (1/1) 85.9-->67.5-->47.6.    -  Pt has not followed with a cardiologist for >5 yrs.    -  tele SR, no events; EKG unchanged; denies any CV complaints  -  echo shows NL LVF.    -  Nuclear stress test today, results pending.  If NL perfusion, no further testing recommended at this time  -  cont ASA, statin
Presents with episode L sided CP started 1 day prior to admission after argument at work, came to ER, found to have elevated trop but left AMA.  Returned to ER with CP when she woke up.  She also c/o recent exertional SOB.  Elevated trop (12/31) 219.3--> (1/1) 85.9-->67.5-->47.6.  States she has not seen a cardiologist for >5 yrs.  Pt with hx of HTN, HLD, DM, heart murmur, +FH (ASHD) and denies hx AHSD, VHD, arrhythmias.  -  tele SR, no events; EKG unchanged; denies any CV complaints  -  echo today; plan for stress test in am, keep NPO past MN  -  cont ASA, statin

## 2025-01-03 NOTE — DISCHARGE NOTE PROVIDER - NSDCCAREPROVSEEN_GEN_ALL_CORE_FT
Hermes Madden (Cardiology)  Vandana Gottlieb (Cardiology)  Guillermo Alcantar (Medicine)  Manny Bonilla (Neurosurgery)  Christian Craven (Neurology)  Omar Mock (Cardiology)  Lukasz Wilson (Medicine)

## 2025-01-03 NOTE — DISCHARGE NOTE PROVIDER - NSDCPNSUBOBJ_GEN_ALL_CORE
Chief Complaint: Chest discomfort    Interval Hx: Patient seen and examined this AM, reports feeling improved as chest discomfort has eased and stress MPI done today was negative. No other complaints. She does have known L hand decreased strength, bit of L hand clumsiness. No other neurological symptoms. MR brain was negative for stroke. MR C spine did show some DJD and disc protrusion. Patient electing conservative management. Referred for PT/OT. Outpatient Neurology and Neurosurgery follow up.     ROS: Multi system review is comprehensively negative x 10 systems except as above    Vitals:  T(F): 97.7 (03 Jan 2025 07:49), Max: 98.1 (03 Jan 2025 00:30)  HR: 75 (03 Jan 2025 07:49) (75 - 85)  BP: 116/54 (03 Jan 2025 07:49) (116/54 - 156/67)  RR: 18 (03 Jan 2025 07:49) (18 - 18)  SpO2: 100% (03 Jan 2025 07:49) (96% - 100%) on room air    Exam:  Gen: No acute distress  HEENT: NCAT PERRL EOMI MMM clear oropharynx  Neck: Supple, no JVD, no LAD, no C spine tenderness  CVS: s1 s2 normal, RRR  Chest: Normal resp effort, lungs CTA B/L, no chest tenderness to palpation, no rash over chest  Abd: Non distended, +BS, soft, non tender  Ext: No edema, no tenderness  Skin: Warm, dry  Mood: Calm, pleasant  Neuro: AO x 3. Appropriately interactive, normal affect. Speech fluent. No aphasia or paraphasic errors. Naming /repetition intact. CN intact, chronic B/L ptosis. No pronator drift, strength 5/5 in all 4 ext except L hand  strength which is weak. Normal bulk and tone. Sens intact to light touch. Hyper-reflexic all over except absent L knee jurk, L AJ, downgoing toes b/l, neg Zuniga's. No FNFA, dysmetria, NII intact. Gait not assessed.    Labs:                        11.1   5.67  )---------( 286                   35.7       137  |  107  |  27  -----------------------<  267  4.4   |   27   |  0.88    Ca    9.3    Mg     2.4         TPro  6.9  /  Alb  3.1 /  TBili  0.3  /  DBili  x   /  AST  11  /  ALT  19  /  AlkPhos  101      PT: 10.2 sec;   INR: 0.86 ratio  PTT:26.1 sec    Troponin 86-->47    ProBNP 233    Ddimer negative   A1c 11.7           Imaging:  MR brain WO 1/2: Ischemic white matter disease and atrophy typical for age. No evidence of infarction    US venous duplex B/L LE 1/1: No evidence of deep venous thrombosis in either lower extremity.    CT head WO 1/1: No evidence of acute intracranial pathology.     Cardiac Testing:  Stress MPI 1/3: Normal myocardial perfusion scan, with no evidence of infarction or inducible ischemia. Baseline electrocardiogram: normal sinus rhythm with no arrhythmias. Stress electrocardiogram: No ischemic ST segment changes. The post stress left ventricular EF is 72 %. The stress end diastolic volume is 83 ml and systolic volume is 23 ml.    TTE 1/2: LV cavity normal in size. LV wall thickness normal. LV calculated ejection fraction of 74 % by the Osborne's biplane method of disks with an ejection fraction visually estimated at 70 to 75%. No LVH. No diastolic dysfunction. Normal RV. Normal LA. Normal RA. Interatrial septum is aneurysmal. No AS or AI. No MS or MR. Trace TR. Normal PASP estimate. The aortic root at the sinuses of Valsalva is normal in size, measuring 2.90 cm (indexed 1.50 cm/m²). No pericardial effusion seen. The inferior vena cava is normal in size measuring 1.77 cm in diameter, (normal <2.1cm) with normal inspiratory collapse (normal >50%).    EKG 1/1:  Normal sinus rhythm. Normal ECG

## 2025-01-03 NOTE — PROGRESS NOTE ADULT - PROBLEM SELECTOR PLAN 4
Pt with c/o L hand numbness, tingling.  -  tele SR, no Afib or events  -  CT head (1/1) no acute intracranial pathology  -  Neuro following, MRI brain and C spine ordered; TTE with bubble study if MRI positive for stroke
Pt with c/o L hand numbness, tingling.  -  tele SR, no Afib or events  -  CT head (1/1) no acute intracranial pathology  -  Neuro following, MRI brain showed Ischemic white matter disease and atrophy typical for age. No   evidence of infarction

## 2025-01-03 NOTE — DISCHARGE NOTE PROVIDER - NSDCMRMEDTOKEN_GEN_ALL_CORE_FT
aspirin 81 mg oral tablet: 1 tab(s) orally once a day  Glucophage 850 mg oral tablet: 1 tab(s) orally 2 times a day  HumaLOG 100 units/mL injectable solution: injectable 3 times a day (with meals) ***sliding scale***  Lantus 100 units/mL subcutaneous solution: 20 unit(s) subcutaneous once a day (in the evening)  levothyroxine 25 mcg (0.025 mg) oral tablet: 1 tab(s) orally once a day  Lipitor 40 mg oral tablet: 1 tab(s) orally once a day  Lotensin 10 mg oral tablet: 1 tab(s) orally once a day  metoprolol succinate 25 mg oral tablet, extended release: 1 tab(s) orally once a day  Ozempic (1 mg dose) 4 mg/3 mL subcutaneous solution: 1  subcutaneous once a week on mondays  Tylenol 500 mg oral tablet: 2 tab(s) orally every 6 hours, As Needed

## 2025-01-03 NOTE — OCCUPATIONAL THERAPY INITIAL EVALUATION ADULT - PERTINENT HX OF CURRENT PROBLEM, REHAB EVAL
Per chart, pt is a 65 y/o F w/ PMH of HTN, dyslipidemia, DM, TIA, congenital ptosis b/l, here for cardiac w/u 2/2 CP. Patient developed CP acutely  after a stressful event at her job.  Patient also h/o L hand decreased strength and paresthesia since she started a job in November. States that she has trouble grasping things sometimes. CT head was neg for any acute findings.

## 2025-01-03 NOTE — DISCHARGE NOTE NURSING/CASE MANAGEMENT/SOCIAL WORK - FINANCIAL ASSISTANCE
[facility] provides services at a reduced cost to those who are determined to be eligible through [facility]’s financial assistance program. Information regarding [facility]’s financial assistance program can be found by going to [email or web address] or by calling [phone].

## 2025-01-03 NOTE — PROGRESS NOTE ADULT - NS ATTEND AMEND GEN_ALL_CORE FT
Case discussed with SALOME Chadwick.  I reviewed today's TTE: LV function is normal. Await nuclear stress test.
Discussed above with NP   Atypical chest pain : echo unremarkable and nuclear stress negative for ischemia     will sign off. please reconsult as needed

## 2025-01-03 NOTE — OCCUPATIONAL THERAPY INITIAL EVALUATION ADULT - STRENGTHENING, PT EVAL
Pt will improve LUE strength by 1/2 grade in order to promote increased ease w/ fxl transfers in 2 weeks.

## 2025-01-03 NOTE — DISCHARGE NOTE NURSING/CASE MANAGEMENT/SOCIAL WORK - PATIENT PORTAL LINK FT
You can access the FollowMyHealth Patient Portal offered by [facility] by registering at the following website: [email or web address]. By joining [employer]’s FollowMyHealth portal, you will also be able to view your health information using other applications (apps) compatible with our system.

## 2025-01-09 DIAGNOSIS — Z79.890 HORMONE REPLACEMENT THERAPY: ICD-10-CM

## 2025-01-09 DIAGNOSIS — R29.898 OTHER SYMPTOMS AND SIGNS INVOLVING THE MUSCULOSKELETAL SYSTEM: ICD-10-CM

## 2025-01-09 DIAGNOSIS — E78.5 HYPERLIPIDEMIA, UNSPECIFIED: ICD-10-CM

## 2025-01-09 DIAGNOSIS — Z88.2 ALLERGY STATUS TO SULFONAMIDES: ICD-10-CM

## 2025-01-09 DIAGNOSIS — M50.31 OTHER CERVICAL DISC DEGENERATION, HIGH CERVICAL REGION: ICD-10-CM

## 2025-01-09 DIAGNOSIS — Z86.73 PERSONAL HISTORY OF TRANSIENT ISCHEMIC ATTACK (TIA), AND CEREBRAL INFARCTION WITHOUT RESIDUAL DEFICITS: ICD-10-CM

## 2025-01-09 DIAGNOSIS — E03.9 HYPOTHYROIDISM, UNSPECIFIED: ICD-10-CM

## 2025-01-09 DIAGNOSIS — E11.65 TYPE 2 DIABETES MELLITUS WITH HYPERGLYCEMIA: ICD-10-CM

## 2025-01-09 DIAGNOSIS — R07.89 OTHER CHEST PAIN: ICD-10-CM

## 2025-01-09 DIAGNOSIS — R26.81 UNSTEADINESS ON FEET: ICD-10-CM

## 2025-01-09 DIAGNOSIS — Z80.41 FAMILY HISTORY OF MALIGNANT NEOPLASM OF OVARY: ICD-10-CM

## 2025-01-09 DIAGNOSIS — Z79.4 LONG TERM (CURRENT) USE OF INSULIN: ICD-10-CM

## 2025-01-09 DIAGNOSIS — M50.322 OTHER CERVICAL DISC DEGENERATION AT C5-C6 LEVEL: ICD-10-CM

## 2025-01-09 DIAGNOSIS — Z82.49 FAMILY HISTORY OF ISCHEMIC HEART DISEASE AND OTHER DISEASES OF THE CIRCULATORY SYSTEM: ICD-10-CM

## 2025-01-09 DIAGNOSIS — I10 ESSENTIAL (PRIMARY) HYPERTENSION: ICD-10-CM

## 2025-01-09 DIAGNOSIS — R06.00 DYSPNEA, UNSPECIFIED: ICD-10-CM

## 2025-01-09 DIAGNOSIS — Z89.422 ACQUIRED ABSENCE OF OTHER LEFT TOE(S): ICD-10-CM

## 2025-01-14 PROBLEM — G45.9 TRANSIENT CEREBRAL ISCHEMIC ATTACK, UNSPECIFIED: Chronic | Status: ACTIVE | Noted: 2025-01-01

## 2025-01-17 ENCOUNTER — APPOINTMENT (OUTPATIENT)
Dept: CARDIOLOGY | Facility: CLINIC | Age: 65
End: 2025-01-17

## 2025-03-25 NOTE — ED PROVIDER NOTE - MUSCULOSKELETAL, MLM
No Spine appears normal, range of motion is not limited, no muscle or joint tenderness Spine appears normal, range of motion is not limited, no muscle or joint tenderness. Surgical partial amputation to the left second toe.

## 2025-04-02 ENCOUNTER — EMERGENCY (EMERGENCY)
Facility: HOSPITAL | Age: 65
LOS: 0 days | Discharge: HOME | End: 2025-04-02
Attending: EMERGENCY MEDICINE
Payer: COMMERCIAL

## 2025-04-02 VITALS — WEIGHT: 179.9 LBS | HEIGHT: 65 IN

## 2025-04-02 VITALS
RESPIRATION RATE: 19 BRPM | SYSTOLIC BLOOD PRESSURE: 148 MMHG | DIASTOLIC BLOOD PRESSURE: 78 MMHG | TEMPERATURE: 98 F | WEIGHT: 189.6 LBS | OXYGEN SATURATION: 100 % | HEART RATE: 84 BPM

## 2025-04-02 DIAGNOSIS — R51.9 HEADACHE, UNSPECIFIED: ICD-10-CM

## 2025-04-02 DIAGNOSIS — Z89.422 ACQUIRED ABSENCE OF OTHER LEFT TOE(S): Chronic | ICD-10-CM

## 2025-04-02 DIAGNOSIS — Z98.1 ARTHRODESIS STATUS: Chronic | ICD-10-CM

## 2025-04-02 PROCEDURE — 99284 EMERGENCY DEPT VISIT MOD MDM: CPT

## 2025-04-02 PROCEDURE — 73130 X-RAY EXAM OF HAND: CPT | Mod: 26,RT

## 2025-04-02 PROCEDURE — 29130 APPL FINGER SPLINT STATIC: CPT | Mod: F5

## 2025-04-02 PROCEDURE — 99284 EMERGENCY DEPT VISIT MOD MDM: CPT | Mod: 25

## 2025-04-02 PROCEDURE — 29130 APPL FINGER SPLINT STATIC: CPT | Mod: RT

## 2025-04-02 PROCEDURE — 73130 X-RAY EXAM OF HAND: CPT | Mod: RT

## 2025-04-02 PROCEDURE — 99283 EMERGENCY DEPT VISIT LOW MDM: CPT | Mod: 25

## 2025-04-02 NOTE — ED STATDOCS - PROGRESS NOTE DETAILS
Patient seen and evaluated, ED attending note and orders reviewed, will continue with patient follow up and care -Apolonia Forbes PA-C XRays negative for fx, finger placed in finger splints for comfort will dc home with pmd and hand f/u return precautions given pt agreeable to dc and plan of care  Apolonia Forbes PA-C

## 2025-04-02 NOTE — ED STATDOCS - ATTENDING APP SHARED VISIT CONTRIBUTION OF CARE
I,Tanner Johnson MD,  performed the initial face to face bedside interview with this patient regarding history of present illness, review of symptoms and relevant past medical, social and family history.  I completed an independent physical examination.  I was the initial provider who evaluated this patient. I have signed out the follow up of any pending tests (i.e. labs, radiological studies) to the ACP.  I have communicated the patient’s plan of care and disposition with the ACP.  The history, relevant review of systems, past medical and surgical history, medical decision making, and physical examination was documented by the scribe in my presence and I attest to the accuracy of the documentation.

## 2025-04-02 NOTE — ED STATDOCS - PATIENT PORTAL LINK FT
You can access the FollowMyHealth Patient Portal offered by [facility] by registering at the following website: [email or web address]. By joining [employer]’s FollowMyHealth portal, you will also be able to view your health information using other applications (apps) compatible with our system.

## 2025-04-02 NOTE — ED ADULT TRIAGE NOTE - CHIEF COMPLAINT QUOTE
patient c/o right thumb and pointer pain.  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

## 2025-04-02 NOTE — ED STATDOCS - PHYSICAL EXAMINATION
Physical Exam:  Gen: NAD, non-toxic appearing, able to ambulate without assistance  Head: NCAT  HEENT: EOMI, PEERLA, normal conjunctiva, tongue midline, oral mucosa moist  Lung: CTAB, no respiratory distress, no wheezes/rhonchi/rales B/L, speaking in full sentences  CV: RRR, no murmurs, rubs or gallops, distal pulses 2+ b/l  Abd: soft, nontender, no distention, no guarding, no rigidity, no rebound tenderness  MSK: r hand 1st degrees full ROM, 2nd full ROM neurovascularly intact   Skin: Warm, well perfused, no rash  Psych: normal affect, calm

## 2025-04-02 NOTE — ED STATDOCS - OBJECTIVE STATEMENT
65 y/o female with no past PMHx presents to the ED c/o pain in R hand, [facility], Pt bent back 2 digits of r hand, denies falls or any other injuries

## 2025-06-24 NOTE — PROVIDER CONTACT NOTE (CRITICAL VALUE NOTIFICATION) - NAME OF MD/NP/PA/DO NOTIFIED:
Comments: Pt. Reports feeling pain after prolong standing.     Progress Note Counter:       Initial Pain Level::      2/10  Post Session Pain Level:        0/10  Medications Last Reviewed:  6/24/2025  Updated Objective Findings:  None Today  Treatment   ____________________________________________________________________________  Neuromuscular Re-education: Exercise/activities per grid below in red to improve balance, coordination, posture, and proprioception. Includes pain neuroscience education and verbal and manual cues for muscle activation and postural control and correction.     Therapeutic Activities: Therapeutic activities per grid below in blue including dynamic activities and education to improve functional performance, mobility, strength, and coordination. Gait on level surface with focus on safety, normalizing gait pattern, upright posture, heel first at initial contact, push off, obstacle avoidance, and increasing functional walking tolerance.     Therapeutic Exercise: Exercises per grid below to improve mobility, strength, and coordination. Required minimal verbal, manual, and tactile cues to promote proper body alignment, posture, and body mechanics. Progress resistance, repetitions, and complexity of movement as indicated.    Tx area: LBP (to LLE at times with sitting)   6/13/25 6/19/25 6/24/25   Activity/Exercise Parameters     NuStep (supervised warm up while discussing subjective pt report, functional progress, and current symptoms with a gradual increase in intensity if indicated) 7 min  4 min     HEP review and update yes     Patient Education yes yes    HS, Glute, IT band, stretches    Manual today, Pt. Unable to perform with green strap today.    Heel floats Attempted (unable on L)     Supine clams   20 reps RTB    SB TA activation 90/90   5 x 10 sec holds    SB rollout supine    10 reps    gait yes     Standing forward bend on 1/2 wall 10s     Standing fwd bend with alt hip and and ext 2 x 10  Samir